# Patient Record
Sex: FEMALE | Race: BLACK OR AFRICAN AMERICAN | Employment: OTHER | URBAN - METROPOLITAN AREA
[De-identification: names, ages, dates, MRNs, and addresses within clinical notes are randomized per-mention and may not be internally consistent; named-entity substitution may affect disease eponyms.]

---

## 2017-01-24 ENCOUNTER — HOSPITAL ENCOUNTER (OUTPATIENT)
Dept: WOMENS IMAGING | Age: 67
Discharge: HOME | End: 2017-01-24
Attending: OBSTETRICS & GYNECOLOGY | Admitting: OBSTETRICS & GYNECOLOGY

## 2017-01-24 DIAGNOSIS — N95.1 MENOPAUSAL AND FEMALE CLIMACTERIC STATES: ICD-10-CM

## 2017-06-02 ENCOUNTER — HOSPITAL ENCOUNTER (OUTPATIENT)
Dept: OTHER | Age: 67
Discharge: HOME | End: 2017-06-02
Attending: INTERNAL MEDICINE | Admitting: INTERNAL MEDICINE

## 2017-06-02 LAB
ALBUMIN SERPL-MCNC: 4.1 GM/DL (ref 3.4–5)
ALP BLD-CCNC: 64 IU/L (ref 40–128)
ALT SERPL-CCNC: 22 U/L (ref 10–40)
ANION GAP SERPL CALCULATED.3IONS-SCNC: 18 MMOL/L (ref 4–16)
AST SERPL-CCNC: 24 IU/L (ref 15–37)
BILIRUB SERPL-MCNC: 0.4 MG/DL (ref 0–1)
BUN BLDV-MCNC: 15 MG/DL (ref 6–23)
CALCIUM SERPL-MCNC: 10 MG/DL (ref 8.3–10.6)
CEA: 1.3 NG/ML
CHLORIDE BLD-SCNC: 95 MMOL/L (ref 99–110)
CO2: 26 MMOL/L (ref 21–32)
CREAT SERPL-MCNC: 1.2 MG/DL (ref 0.6–1.1)
GFR AFRICAN AMERICAN: 54 ML/MIN/1.73M2
GFR NON-AFRICAN AMERICAN: 45 ML/MIN/1.73M2
GLUCOSE BLD-MCNC: 72 MG/DL (ref 70–140)
POTASSIUM SERPL-SCNC: 4 MMOL/L (ref 3.5–5.1)
SODIUM BLD-SCNC: 139 MMOL/L (ref 135–145)
TOTAL PROTEIN: 7.1 GM/DL (ref 6.4–8.2)

## 2017-06-04 LAB — CA 27.29: 23 U/ML

## 2017-08-14 ENCOUNTER — HOSPITAL ENCOUNTER (OUTPATIENT)
Dept: GENERAL RADIOLOGY | Age: 67
Discharge: HOME | End: 2017-08-14
Attending: FAMILY MEDICINE | Admitting: FAMILY MEDICINE

## 2017-08-14 LAB
ALBUMIN SERPL-MCNC: 4.5 GM/DL (ref 3.4–5)
ALP BLD-CCNC: 75 IU/L (ref 40–129)
ALT SERPL-CCNC: 20 U/L (ref 10–40)
ANION GAP SERPL CALCULATED.3IONS-SCNC: 12 MMOL/L (ref 4–16)
AST SERPL-CCNC: 23 IU/L (ref 15–37)
BACTERIA: ABNORMAL /HPF
BILIRUB SERPL-MCNC: 0.5 MG/DL (ref 0–1)
BILIRUBIN URINE: NEGATIVE MG/DL
BLOOD, URINE: NEGATIVE
BUN BLDV-MCNC: 14 MG/DL (ref 6–23)
CALCIUM SERPL-MCNC: 10.6 MG/DL (ref 8.3–10.6)
CHLORIDE BLD-SCNC: 98 MMOL/L (ref 99–110)
CLARITY: ABNORMAL
CO2: 29 MMOL/L (ref 21–32)
COLOR: YELLOW
CREAT SERPL-MCNC: 1.1 MG/DL (ref 0.6–1.1)
ERYTHROCYTE SEDIMENTATION RATE: 34 MM/HR (ref 0–30)
GFR AFRICAN AMERICAN: >60 ML/MIN/1.73M2
GFR NON-AFRICAN AMERICAN: 50 ML/MIN/1.73M2
GLUCOSE BLD-MCNC: 84 MG/DL (ref 70–140)
GLUCOSE, URINE: NEGATIVE MG/DL
KETONES, URINE: NEGATIVE MG/DL
LEUKOCYTE ESTERASE, URINE: NEGATIVE
MUCUS: ABNORMAL HPF
NITRITE URINE, QUANTITATIVE: NEGATIVE
PH, URINE: 6 (ref 5–8)
POTASSIUM SERPL-SCNC: 4.1 MMOL/L (ref 3.5–5.1)
PROTEIN UA: NEGATIVE MG/DL
RBC URINE: <1 /HPF (ref 0–6)
SODIUM BLD-SCNC: 139 MMOL/L (ref 135–145)
SPECIFIC GRAVITY UA: 1 (ref 1–1.03)
SQUAMOUS EPITHELIAL: 1 /HPF
TOTAL CK: 235 IU/L (ref 26–140)
TOTAL PROTEIN: 7.5 GM/DL (ref 6.4–8.2)
TRANSITIONAL EPITHELIAL: <1 /HPF
UROBILINOGEN, URINE: NORMAL MG/DL (ref 0.2–1)
WBC UA: <1 /HPF (ref 0–5)

## 2017-08-15 LAB — RHEUMATOID FACTOR: NEGATIVE

## 2017-08-16 PROBLEM — D05.11 DUCTAL CARCINOMA IN SITU (DCIS) OF RIGHT BREAST: Status: ACTIVE | Noted: 2017-08-16

## 2017-08-17 LAB
ANTI-NUCLEAR ANTIBODY (ANA): NORMAL
CYCLIC CITRULLINATED PEPTIDE ANTIBODY IGG: 6

## 2017-10-03 ENCOUNTER — HOSPITAL ENCOUNTER (OUTPATIENT)
Dept: OTHER | Age: 67
Discharge: HOME | End: 2017-10-03
Attending: INTERNAL MEDICINE | Admitting: INTERNAL MEDICINE

## 2017-10-03 LAB
ALBUMIN SERPL-MCNC: 4 GM/DL (ref 3.4–5)
ALP BLD-CCNC: 69 IU/L (ref 40–128)
ALT SERPL-CCNC: 21 U/L (ref 10–40)
ANION GAP SERPL CALCULATED.3IONS-SCNC: 13 MMOL/L (ref 4–16)
AST SERPL-CCNC: 23 IU/L (ref 15–37)
BILIRUB SERPL-MCNC: 0.4 MG/DL (ref 0–1)
BUN BLDV-MCNC: 11 MG/DL (ref 6–23)
CALCIUM SERPL-MCNC: 10 MG/DL (ref 8.3–10.6)
CHLORIDE BLD-SCNC: 98 MMOL/L (ref 99–110)
CO2: 28 MMOL/L (ref 21–32)
CREAT SERPL-MCNC: 1.1 MG/DL (ref 0.6–1.1)
GFR AFRICAN AMERICAN: >60 ML/MIN/1.73M2
GFR NON-AFRICAN AMERICAN: 50 ML/MIN/1.73M2
GLUCOSE BLD-MCNC: 84 MG/DL (ref 70–140)
LACTATE DEHYDROGENASE: 201 IU/L (ref 120–246)
POTASSIUM SERPL-SCNC: 3.9 MMOL/L (ref 3.5–5.1)
SODIUM BLD-SCNC: 139 MMOL/L (ref 135–145)
TOTAL PROTEIN: 7 GM/DL (ref 6.4–8.2)

## 2017-10-04 ENCOUNTER — HOSPITAL ENCOUNTER (OUTPATIENT)
Dept: ULTRASOUND IMAGING | Age: 67
Discharge: HOME | End: 2017-10-04
Attending: INTERNAL MEDICINE | Admitting: INTERNAL MEDICINE

## 2017-10-04 DIAGNOSIS — C50.411 MALIGNANT NEOPLASM OF UPPER-OUTER QUADRANT OF RIGHT FEMALE BREAST (HCC): ICD-10-CM

## 2017-10-04 LAB
ALBUMIN ELP: 3.7 GM/DL (ref 3.2–5.6)
ALPHA-1-GLOBULIN: 0.2 GM/DL (ref 0.1–0.4)
ALPHA-2-GLOBULIN: 0.6 GM/DL (ref 0.4–1.2)
BETA GLOBULIN: 1.1 GM/DL (ref 0.5–1.3)
GAMMA GLOBULIN: 1.4 GM/DL (ref 0.5–1.6)
TOTAL PROTEIN: 7 GM/DL (ref 6.4–8.2)

## 2018-08-20 ENCOUNTER — HOSPITAL ENCOUNTER (OUTPATIENT)
Dept: GENERAL RADIOLOGY | Age: 68
Discharge: HOME | End: 2018-08-20
Attending: FAMILY MEDICINE | Admitting: FAMILY MEDICINE

## 2018-08-20 LAB
ALBUMIN SERPL-MCNC: 4.7 GM/DL (ref 3.4–5)
ALP BLD-CCNC: 79 IU/L (ref 40–129)
ALT SERPL-CCNC: 22 U/L (ref 10–40)
ANION GAP SERPL CALCULATED.3IONS-SCNC: 18 MMOL/L (ref 4–16)
AST SERPL-CCNC: 27 IU/L (ref 15–37)
BACTERIA: ABNORMAL /HPF
BASOPHILS ABSOLUTE: 0.1 K/CU MM
BASOPHILS RELATIVE PERCENT: 0.8 % (ref 0–1)
BILIRUB SERPL-MCNC: 0.4 MG/DL (ref 0–1)
BILIRUBIN URINE: NEGATIVE MG/DL
BLOOD, URINE: NEGATIVE
BUN BLDV-MCNC: 12 MG/DL (ref 6–23)
CALCIUM SERPL-MCNC: 11.1 MG/DL (ref 8.3–10.6)
CHLORIDE BLD-SCNC: 91 MMOL/L (ref 99–110)
CLARITY: CLEAR
CO2: 26 MMOL/L (ref 21–32)
COLOR: YELLOW
CREAT SERPL-MCNC: 1.1 MG/DL (ref 0.6–1.1)
DIFFERENTIAL TYPE: ABNORMAL
EOSINOPHILS ABSOLUTE: 0.1 K/CU MM
EOSINOPHILS RELATIVE PERCENT: 1.1 % (ref 0–3)
GFR AFRICAN AMERICAN: 60 ML/MIN/1.73M2
GFR NON-AFRICAN AMERICAN: 50 ML/MIN/1.73M2
GLUCOSE FASTING: 95 MG/DL (ref 70–99)
GLUCOSE, URINE: NEGATIVE MG/DL
HCT VFR BLD CALC: 47.2 % (ref 37–47)
HEMOGLOBIN: 15.6 GM/DL (ref 12.5–16)
IMMATURE NEUTROPHIL %: 0.1 % (ref 0–0.43)
KETONES, URINE: NEGATIVE MG/DL
LEUKOCYTE ESTERASE, URINE: NEGATIVE
LYMPHOCYTES ABSOLUTE: 3.6 K/CU MM
LYMPHOCYTES RELATIVE PERCENT: 45.6 % (ref 24–44)
MCH RBC QN AUTO: 30.6 PG (ref 27–31)
MCHC RBC AUTO-ENTMCNC: 33.1 % (ref 32–36)
MCV RBC AUTO: 92.5 FL (ref 78–100)
MONOCYTES ABSOLUTE: 0.8 K/CU MM
MONOCYTES RELATIVE PERCENT: 10.3 % (ref 0–4)
MUCUS: ABNORMAL HPF
NITRITE URINE, QUANTITATIVE: NEGATIVE
NUCLEATED RBC %: 0 %
PDW BLD-RTO: 13.1 % (ref 11.7–14.9)
PH, URINE: 5 (ref 5–8)
PLATELET # BLD: 306 K/CU MM (ref 140–440)
PMV BLD AUTO: 11 FL (ref 7.5–11.1)
POTASSIUM SERPL-SCNC: 3.7 MMOL/L (ref 3.5–5.1)
PROTEIN UA: NEGATIVE MG/DL
RBC # BLD: 5.1 M/CU MM (ref 4.2–5.4)
RBC URINE: <1 /HPF (ref 0–6)
SEGMENTED NEUTROPHILS ABSOLUTE COUNT: 3.4 K/CU MM
SEGMENTED NEUTROPHILS RELATIVE PERCENT: 42.1 % (ref 36–66)
SODIUM BLD-SCNC: 135 MMOL/L (ref 135–145)
SPECIFIC GRAVITY UA: 1 (ref 1–1.03)
SQUAMOUS EPITHELIAL: 1 /HPF
TOTAL CK: 270 IU/L (ref 26–140)
TOTAL IMMATURE NEUTOROPHIL: 0.01 K/CU MM
TOTAL NUCLEATED RBC: 0 K/CU MM
TOTAL PROTEIN: 7.9 GM/DL (ref 6.4–8.2)
TRICHOMONAS: ABNORMAL /HPF
UROBILINOGEN, URINE: NORMAL MG/DL (ref 0.2–1)
WBC # BLD: 8 K/CU MM (ref 4–10.5)
WBC UA: <1 /HPF (ref 0–5)

## 2018-09-13 ENCOUNTER — HOSPITAL ENCOUNTER (OUTPATIENT)
Dept: GENERAL RADIOLOGY | Age: 68
Discharge: HOME | End: 2018-09-13
Attending: FAMILY MEDICINE | Admitting: FAMILY MEDICINE

## 2018-09-13 LAB
ANION GAP SERPL CALCULATED.3IONS-SCNC: 9 MMOL/L (ref 4–16)
CHLORIDE BLD-SCNC: 100 MMOL/L (ref 99–110)
CO2: 31 MMOL/L (ref 21–32)
POTASSIUM SERPL-SCNC: 4 MMOL/L (ref 3.5–5.1)
SODIUM BLD-SCNC: 140 MMOL/L (ref 135–145)

## 2018-09-15 LAB — PARATHYROID HORMONE INTACT: 55

## 2018-10-08 ENCOUNTER — HOSPITAL ENCOUNTER (OUTPATIENT)
Dept: ULTRASOUND IMAGING | Age: 68
End: 2018-10-08
Payer: MEDICARE

## 2018-10-08 ENCOUNTER — HOSPITAL ENCOUNTER (OUTPATIENT)
Dept: WOMENS IMAGING | Age: 68
Discharge: HOME | End: 2018-10-08
Payer: MEDICARE

## 2018-10-08 DIAGNOSIS — C50.211 MALIGNANT NEOPLASM OF UPPER-INNER QUADRANT OF RIGHT FEMALE BREAST, UNSPECIFIED ESTROGEN RECEPTOR STATUS (HCC): ICD-10-CM

## 2018-10-08 PROCEDURE — G0279 TOMOSYNTHESIS, MAMMO: HCPCS

## 2019-07-30 ENCOUNTER — OFFICE VISIT (OUTPATIENT)
Dept: INTERNAL MEDICINE CLINIC | Age: 69
End: 2019-07-30
Payer: MEDICARE

## 2019-07-30 VITALS
HEIGHT: 67 IN | BODY MASS INDEX: 35 KG/M2 | SYSTOLIC BLOOD PRESSURE: 130 MMHG | OXYGEN SATURATION: 97 % | DIASTOLIC BLOOD PRESSURE: 92 MMHG | WEIGHT: 223 LBS | HEART RATE: 79 BPM

## 2019-07-30 DIAGNOSIS — E66.09 CLASS 1 OBESITY DUE TO EXCESS CALORIES WITHOUT SERIOUS COMORBIDITY WITH BODY MASS INDEX (BMI) OF 34.0 TO 34.9 IN ADULT: ICD-10-CM

## 2019-07-30 DIAGNOSIS — Z79.899 LONG TERM USE OF DRUG: ICD-10-CM

## 2019-07-30 DIAGNOSIS — Z00.00 ROUTINE GENERAL MEDICAL EXAMINATION AT A HEALTH CARE FACILITY: Primary | ICD-10-CM

## 2019-07-30 PROCEDURE — G0438 PPPS, INITIAL VISIT: HCPCS | Performed by: FAMILY MEDICINE

## 2019-07-30 PROCEDURE — G0447 BEHAVIOR COUNSEL OBESITY 15M: HCPCS | Performed by: FAMILY MEDICINE

## 2019-07-30 ASSESSMENT — PATIENT HEALTH QUESTIONNAIRE - PHQ9
SUM OF ALL RESPONSES TO PHQ QUESTIONS 1-9: 0
SUM OF ALL RESPONSES TO PHQ QUESTIONS 1-9: 0

## 2019-08-08 ENCOUNTER — HOSPITAL ENCOUNTER (OUTPATIENT)
Age: 69
Discharge: HOME | End: 2019-08-08
Payer: MEDICARE

## 2019-08-08 LAB
ANION GAP SERPL CALCULATED.3IONS-SCNC: 12 MMOL/L (ref 4–16)
BUN BLDV-MCNC: 11 MG/DL (ref 6–23)
CALCIUM SERPL-MCNC: 10.1 MG/DL (ref 8.3–10.6)
CHLORIDE BLD-SCNC: 98 MMOL/L (ref 99–110)
CO2: 28 MMOL/L (ref 21–32)
CREAT SERPL-MCNC: 1.1 MG/DL (ref 0.6–1.1)
GFR AFRICAN AMERICAN: 60 ML/MIN/1.73M2
GFR NON-AFRICAN AMERICAN: 49 ML/MIN/1.73M2
GLUCOSE BLD-MCNC: 79 MG/DL (ref 70–99)
POTASSIUM SERPL-SCNC: 4.1 MMOL/L (ref 3.5–5.1)
SODIUM BLD-SCNC: 138 MMOL/L (ref 135–145)

## 2019-08-08 PROCEDURE — 80048 BASIC METABOLIC PNL TOTAL CA: CPT

## 2019-08-08 PROCEDURE — 36415 COLL VENOUS BLD VENIPUNCTURE: CPT

## 2019-08-15 ASSESSMENT — PATIENT HEALTH QUESTIONNAIRE - PHQ9
SUM OF ALL RESPONSES TO PHQ QUESTIONS 1-9: 0
SUM OF ALL RESPONSES TO PHQ QUESTIONS 1-9: 0

## 2019-08-15 NOTE — PROGRESS NOTES
Preventive Plan   Current Health Maintenance Status    There is no immunization history on file for this patient. Health Maintenance   Topic Date Due    DTaP/Tdap/Td vaccine (1 - Tdap) 1969    Shingles Vaccine (1 of 2) 2000    Colon cancer screen colonoscopy  2000    Pneumococcal 65+ years Vaccine (1 of 2 - PCV13) 2015    Hepatitis C screen  2020 (Originally 1950)    Flu vaccine (1) 2019    Lipid screen  2019    Breast cancer screen  10/08/2019    Potassium monitoring  2020    Creatinine monitoring  2020    Annual Wellness Visit (AWV)  2020    DEXA (modify frequency per FRAX score)  Completed     Recommendations for Preventive Services Due: see orders and patient instructions/AVS.  . Recommended screening schedule for the next 5-10 years is provided to the patient in written form: see Patient Brittany Gillette was seen today for medicare awv. Diagnoses and all orders for this visit:    Routine general medical examination at a health care facility  -     [place], [address] term use of drug  -     BASIC METABOLIC PANEL; Future    Class 1 obesity due to excess calories without serious comorbidity with body mass index (BMI) of 34.0 to 34.9 in adult  -     [place] Behavior  obesity 15m []    Other orders  -     spironolactone-hydrochlorothiazide (ALDACTAZIDE) 50-50 MG per tablet; Take 1 tablet by mouth daily                        Medicare Annual Wellness Visit  Name: Juani Arriaga Date: 8/15/2019   MRN: [id number] Sex: Female   Age: 76 y.o. Ethnicity: Non-/Non    : 1950 Race: Black      Burton Wilkinson is here for [place] for behavioral, psychosocial and functional/safety risks, and cognitive dysfunction are all negative except as indicated below.  These results, as well as other patient data from the Health Risk Assessment form, are documented in Flowsheets orders for this visit:    Routine general medical examination at a health care facility  -     [place], [address] term use of drug  -     BASIC METABOLIC PANEL; Future    Class 1 obesity due to excess calories without serious comorbidity with body mass index (BMI) of 34.0 to 34.9 in adult  -     [place] Behavior  obesity 15m []    Other orders  -     spironolactone-hydrochlorothiazide (ALDACTAZIDE) 50-50 MG per tablet;  Take 1 tablet by mouth daily

## 2019-10-03 ENCOUNTER — HOSPITAL ENCOUNTER (OUTPATIENT)
Dept: RADIATION ONCOLOGY | Age: 69
Discharge: HOME | End: 2019-10-03
Payer: MEDICARE

## 2019-10-03 VITALS
BODY MASS INDEX: 34.29 KG/M2 | SYSTOLIC BLOOD PRESSURE: 148 MMHG | TEMPERATURE: 98.6 F | OXYGEN SATURATION: 96 % | WEIGHT: 218.5 LBS | HEIGHT: 67 IN | RESPIRATION RATE: 16 BRPM | DIASTOLIC BLOOD PRESSURE: 74 MMHG | HEART RATE: 72 BPM

## 2019-10-03 DIAGNOSIS — C50.811 MALIGNANT NEOPLASM OF OVERLAPPING SITES OF RIGHT BREAST IN FEMALE, ESTROGEN RECEPTOR NEGATIVE (HCC): Primary | ICD-10-CM

## 2019-10-03 DIAGNOSIS — Z17.1 MALIGNANT NEOPLASM OF OVERLAPPING SITES OF RIGHT BREAST IN FEMALE, ESTROGEN RECEPTOR NEGATIVE (HCC): Primary | ICD-10-CM

## 2019-10-10 ENCOUNTER — HOSPITAL ENCOUNTER (OUTPATIENT)
Dept: WOMENS IMAGING | Age: 69
Discharge: HOME | End: 2019-10-10
Payer: MEDICARE

## 2019-10-10 DIAGNOSIS — C50.811 MALIGNANT NEOPLASM OF OVERLAPPING SITES OF RIGHT FEMALE BREAST, UNSPECIFIED ESTROGEN RECEPTOR STATUS (HCC): ICD-10-CM

## 2019-10-10 PROCEDURE — G0279 TOMOSYNTHESIS, MAMMO: HCPCS

## 2020-07-27 RX ORDER — SPIRONOLACTONE AND HYDROCHLOROTHIAZIDE 50; 50 MG/1; MG/1
1 TABLET, FILM COATED ORAL DAILY
Qty: 90 TABLET | Refills: 0 | Status: SHIPPED | OUTPATIENT
Start: 2020-07-27 | End: 2020-07-31 | Stop reason: SDUPTHER

## 2020-07-31 ENCOUNTER — OFFICE VISIT (OUTPATIENT)
Dept: INTERNAL MEDICINE CLINIC | Age: 70
End: 2020-07-31
Payer: MEDICARE

## 2020-07-31 ENCOUNTER — NURSE ONLY (OUTPATIENT)
Dept: INTERNAL MEDICINE CLINIC | Age: 70
End: 2020-07-31

## 2020-07-31 VITALS
DIASTOLIC BLOOD PRESSURE: 88 MMHG | BODY MASS INDEX: 34.62 KG/M2 | SYSTOLIC BLOOD PRESSURE: 132 MMHG | HEIGHT: 66 IN | OXYGEN SATURATION: 94 % | WEIGHT: 215.4 LBS | TEMPERATURE: 97 F | HEART RATE: 88 BPM

## 2020-07-31 VITALS — TEMPERATURE: 97.1 F

## 2020-07-31 PROCEDURE — 4040F PNEUMOC VAC/ADMIN/RCVD: CPT | Performed by: FAMILY MEDICINE

## 2020-07-31 PROCEDURE — 1123F ACP DISCUSS/DSCN MKR DOCD: CPT | Performed by: FAMILY MEDICINE

## 2020-07-31 PROCEDURE — 3017F COLORECTAL CA SCREEN DOC REV: CPT | Performed by: FAMILY MEDICINE

## 2020-07-31 PROCEDURE — G0439 PPPS, SUBSEQ VISIT: HCPCS | Performed by: FAMILY MEDICINE

## 2020-07-31 RX ORDER — SPIRONOLACTONE AND HYDROCHLOROTHIAZIDE 50; 50 MG/1; MG/1
1 TABLET, FILM COATED ORAL DAILY
Qty: 90 TABLET | Refills: 3 | Status: SHIPPED | OUTPATIENT
Start: 2020-07-31 | End: 2021-08-02 | Stop reason: SDUPTHER

## 2020-07-31 ASSESSMENT — PATIENT HEALTH QUESTIONNAIRE - PHQ9
SUM OF ALL RESPONSES TO PHQ QUESTIONS 1-9: 0
SUM OF ALL RESPONSES TO PHQ QUESTIONS 1-9: 0

## 2020-07-31 ASSESSMENT — LIFESTYLE VARIABLES: HOW OFTEN DO YOU HAVE A DRINK CONTAINING ALCOHOL: 0

## 2020-07-31 NOTE — PROGRESS NOTES
Medicare Annual Wellness Visit  Name: Sowmya Rice Date: 2020   MRN: [id number] Sex: Female   Age: 71 y.o. Ethnicity: Non-/Non    : 1950 Race: Black      Burton Wilkinson is here for [place] for behavioral, psychosocial and functional/safety risks, and cognitive dysfunction are all negative except as indicated below. These results, as well as other patient data from the [address] form, are documented in Flowsheets linked to this Encounter.    -Pt on Aldactazide for years for P edema- stable  -Hx of B/L breast cancer-followed by Dr. Narda Lentz. 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 Allergies   Allergen Reactions    Penicillins     Tape Juanice Newcomer Tape] Rash         Prior to Visit Medications    Medication Sig Taking? Authorizing Provider   spironolactone-hydrochlorothiazide (ALDACTAZIDE) 50-50 MG per tablet Take 1 tablet by mouth daily Yes Nate Christopher DO   Pyridoxine HCl (VITAMIN B-6) 100 MG tablet Take 100 mg by mouth daily Yes Historical Provider, MD   vitamin B-12 (CYANOCOBALAMIN) 1000 MCG tablet Take 1,000 mcg by mouth daily Yes Historical Provider, MD   vitamin B-1 (THIAMINE) 100 MG tablet Take 100 mg by mouth daily Yes Historical Provider, MD   MULTIPLE VITAMIN PO Take  by mouth.  Yes Historical Provider, MD         Past Medical History:   Diagnosis Date    Cancer ([address]     Bilateral Breast    Chemotherapy-induced neuropathy ([facility] [address]     fingers and toes    History of external beam radiation therapy     [facility]    History of external beam radiation therapy     [facility] [place]    Lymphedema of right upper extremity        Past Surgical History:   Procedure Laterality Date    BREAST BIOPSY Left 2002    BREAST BIOPSY Right 2014    BREAST LUMPECTOMY Left 2002    BREAST LUMPECTOMY Right     Right Lumpectomy with [place] Node Bx    BREAST SURGERY Left 2011    Mastectomy with [place] Bx and Reconstruction    TUNNELED VENOUS PORT PLACEMENT         History reviewed. No pertinent family history. CareTeam (Including outside providers/suppliers regularly involved in providing care):   Patient Care Team:  Anju Daniels DO as PCP - General (Family Medicine)  Anju Daniels DO as PCP - [facility] Empaneled Provider  Ashley Grijalva MD as Consulting Physician (Obstetrics & Gynecology)  Yelena Vasquez DO as Consulting Physician (Proctology)  Nahid Matute MD as Consulting Physician (Radiation Oncology)  OLIVERIO Tyler (Optometry)    Wt Readings from Last 3 Encounters:   07/31/20 215 lb 6.4 oz (97.7 kg)   10/03/19 218 lb 8 oz (99.1 kg)   07/30/19 223 lb (101.2 kg)     Vitals:    07/31/20 1118   BP: 132/88   Pulse: 88   Temp: 97 °F (36.1 °C)   SpO2: 94%   Weight: 215 lb 6.4 oz (97.7 kg)   Height: 5' 6\" (1.676 m)     Body mass index is 34.77 kg/m². Based upon direct observation of the patient, evaluation of cognition reveals recent and remote memory intact. Skin: warm and dry, no rash or erythema  Head: normocephalic and atraumatic  Eyes: pupils equal, round, and reactive to light, extraocular eye movements intact, conjunctivae normal  ENT: tympanic membrane, external ear and ear canal normal bilaterally, nose without deformity  Neck: supple.  No cervical lymphadenopathy  Pulmonary/Chest: clear to auscultation bilaterally- no wheezes, rales or rhonchi, normal air movement, no respiratory distress  Cardiovascular: normal rate, regular rhythm, normal S1 and S2, no murmurs, no carotid bruits  Abdomen: soft, non-tender, non-distended, normal bowel sounds  Extremities: mild ankle edema  Musculoskeletal: normal range of motion, no tenderness  Neurologic: no cranial nerve deficit, gait, coordination and speech normal    Patient's complete Health Risk Assessment and screening values have been reviewed and are found in Flowsheets. The following problems were reviewed today and where indicated follow up appointments were made and/or referrals ordered. Positive Risk Factor Screenings with Interventions:     General Health:  General  In general, how would you say your health is?: Very Good  In the past 7 days, have you experienced any of the following? New or Increased Pain, New or Increased Fatigue, Loneliness, Social Isolation, Stress or Anger?: None of These  Do you get the social and emotional support that you need?: Yes  Do you have a Living Will?: (!) No  General Health Risk Interventions:  · Pt may consider living will    Health Habits/Nutrition:  Health Habits/Nutrition  Do you exercise for at least 20 minutes 2-3 times per week?: (!) No  Have you lost any weight without trying in the past 3 months?: No  Do you eat fewer than 2 meals per day?: No  Have you seen a dentist within the past year?: Yes  Body mass index is 34.77 kg/m². Health Habits/Nutrition Interventions:  · Pt states  she does household chores     Hearing/Vision:  No exam data present  Hearing/Vision  Do you or your family notice any trouble with your hearing?: No  Do you have difficulty driving, watching TV, or doing any of your daily activities because of your eyesight?: No  Have you had an eye exam within the past year?: (!) No  Hearing/Vision Interventions:  · Pt will see Dr Janice Linn for eye exam    Personalized Preventive Plan   Current Health Maintenance Status    There is no immunization history on file for this patient.      Health Maintenance   Topic Date Due    Hepatitis C screen  1950    DTaP/Tdap/Td vaccine (1 - Tdap) 11/26/1969    Shingles Vaccine (1 of 2) 11/26/2000    Colon cancer screen colonoscopy  11/26/2000    Pneumococcal 65+ years Vaccine (1 of 1 - PPSV23) 11/26/2015    Annual Wellness Visit (AWV)  06/23/2019    Lipid screen  09/12/2019    Potassium monitoring  08/08/2020    Creatinine monitoring  08/08/2020    Flu vaccine (1) 09/01/2020    Breast cancer screen  10/10/2020    DEXA (modify frequency per FRAX score)  Completed    Hepatitis A vaccine  Aged Out    Hepatitis B vaccine  Aged Out    Hib vaccine  Aged Out    Meningococcal (ACWY) vaccine  Aged Out     Recommendations for Truly Accomplished Due: see orders and patient instructions/AVS.  . Recommended screening schedule for the next 5-10 years is provided to the patient in written form: see Patient Rose Pan was seen today for medicare aw. Diagnoses and all orders for this visit:    Routine general medical examination at a health care facility    Fatigue, unspecified type  -     CBC Auto Differential; Future  -     Comprehensive Metabolic Panel; Future    Chemotherapy-induced neuropathy (HCC)    Other orders  -     spironolactone-hydrochlorothiazide (ALDACTAZIDE) 50-50 MG per tablet;  Take 1 tablet by mouth daily    -Declines to follow up for repeat colon cancer screening at this time  -Declines lipid panel  -Declines vaccinations at this time  - Keep f/u with Gyn and specialists

## 2020-07-31 NOTE — PATIENT INSTRUCTIONS
Personalized Preventive Plan for [facility] - 7/31/2020  Medicare offers a range of preventive health benefits. Some of the tests and screenings are paid in full while other may be subject to a deductible, co-insurance, and/or copay. Some of these benefits include a comprehensive review of your medical history including lifestyle, illnesses that may run in your family, and various assessments and screenings as appropriate. After reviewing your medical record and screening and assessments performed today your provider may have ordered immunizations, labs, imaging, and/or referrals for you. A list of these orders (if applicable) as well as your Preventive Care list are included within your After Visit Summary for your review. Other Preventive Recommendations:    · A preventive eye exam performed by an eye specialist is recommended every 1-2 years to screen for glaucoma; cataracts, macular degeneration, and other eye disorders. · A preventive dental visit is recommended every 6 months. · Try to get at least 150 minutes of exercise per week or 10,000 steps per day on a pedometer . · Order or download the FREE \"Exercise & Physical Activity: Your Everyday Guide\" from [employer]. Call 7-619.572.9892 or search [employer] online. · You need 7491-3550 mg of calcium and 2433-9627 IU of vitamin D per day. It is possible to meet your calcium requirement with diet alone, but a vitamin D supplement is usually necessary to meet this goal.  · When exposed to the sun, use a sunscreen that protects against both UVA and UVB radiation with an SPF of 30 or greater. Reapply every 2 to 3 hours or after sweating, drying off with a towel, or swimming. · Always wear a seat belt when traveling in a car. Always wear a helmet when riding a bicycle or motorcycle.

## 2020-08-13 ENCOUNTER — HOSPITAL ENCOUNTER (OUTPATIENT)
Age: 70
Discharge: HOME | End: 2020-08-13
Payer: MEDICARE

## 2020-08-13 LAB
ALBUMIN SERPL-MCNC: 4.4 GM/DL (ref 3.4–5)
ALP BLD-CCNC: 63 IU/L (ref 40–128)
ALT SERPL-CCNC: 20 U/L (ref 10–40)
ANION GAP SERPL CALCULATED.3IONS-SCNC: 8 MMOL/L (ref 4–16)
AST SERPL-CCNC: 23 IU/L (ref 15–37)
BASOPHILS ABSOLUTE: 0.1 K/CU MM
BASOPHILS RELATIVE PERCENT: 1.2 % (ref 0–1)
BILIRUB SERPL-MCNC: 0.6 MG/DL (ref 0–1)
BUN BLDV-MCNC: 13 MG/DL (ref 6–23)
CALCIUM SERPL-MCNC: 10.5 MG/DL (ref 8.3–10.6)
CHLORIDE BLD-SCNC: 100 MMOL/L (ref 99–110)
CO2: 30 MMOL/L (ref 21–32)
CREAT SERPL-MCNC: 1.2 MG/DL (ref 0.6–1.1)
DIFFERENTIAL TYPE: ABNORMAL
EOSINOPHILS ABSOLUTE: 0.1 K/CU MM
EOSINOPHILS RELATIVE PERCENT: 0.9 % (ref 0–3)
GFR AFRICAN AMERICAN: 54 ML/MIN/1.73M2
GFR NON-AFRICAN AMERICAN: 45 ML/MIN/1.73M2
GLUCOSE BLD-MCNC: 120 MG/DL (ref 70–99)
HCT VFR BLD CALC: 46.7 % (ref 37–47)
HEMOGLOBIN: 15 GM/DL (ref 12.5–16)
IMMATURE NEUTROPHIL %: 0.4 % (ref 0–0.43)
LYMPHOCYTES ABSOLUTE: 2.5 K/CU MM
LYMPHOCYTES RELATIVE PERCENT: 37 % (ref 24–44)
MCH RBC QN AUTO: 29.8 PG (ref 27–31)
MCHC RBC AUTO-ENTMCNC: 32.1 % (ref 32–36)
MCV RBC AUTO: 92.7 FL (ref 78–100)
MONOCYTES ABSOLUTE: 0.7 K/CU MM
MONOCYTES RELATIVE PERCENT: 9.6 % (ref 0–4)
NUCLEATED RBC %: 0 %
PDW BLD-RTO: 13.2 % (ref 11.7–14.9)
PLATELET # BLD: 265 K/CU MM (ref 140–440)
PMV BLD AUTO: 10.8 FL (ref 7.5–11.1)
POTASSIUM SERPL-SCNC: 3.8 MMOL/L (ref 3.5–5.1)
RBC # BLD: 5.04 M/CU MM (ref 4.2–5.4)
SEGMENTED NEUTROPHILS ABSOLUTE COUNT: 3.4 K/CU MM
SEGMENTED NEUTROPHILS RELATIVE PERCENT: 50.9 % (ref 36–66)
SODIUM BLD-SCNC: 138 MMOL/L (ref 135–145)
TOTAL IMMATURE NEUTOROPHIL: 0.03 K/CU MM
TOTAL NUCLEATED RBC: 0 K/CU MM
TOTAL PROTEIN: 7.3 GM/DL (ref 6.4–8.2)
WBC # BLD: 6.8 K/CU MM (ref 4–10.5)

## 2020-08-13 PROCEDURE — 36415 COLL VENOUS BLD VENIPUNCTURE: CPT

## 2020-08-13 PROCEDURE — 85025 COMPLETE CBC W/AUTO DIFF WBC: CPT

## 2020-08-13 PROCEDURE — 80053 COMPREHEN METABOLIC PANEL: CPT

## 2020-08-14 RX ORDER — SPIRONOLACTONE AND HYDROCHLOROTHIAZIDE 50; 50 MG/1; MG/1
1 TABLET, FILM COATED ORAL DAILY
Qty: 90 TABLET | Refills: 3 | OUTPATIENT
Start: 2020-08-14

## 2020-10-08 ENCOUNTER — HOSPITAL ENCOUNTER (OUTPATIENT)
Dept: RADIATION ONCOLOGY | Age: 70
Discharge: HOME | End: 2020-10-08
Payer: MEDICARE

## 2020-10-08 VITALS
HEIGHT: 67 IN | OXYGEN SATURATION: 96 % | HEART RATE: 80 BPM | TEMPERATURE: 97.8 F | RESPIRATION RATE: 18 BRPM | SYSTOLIC BLOOD PRESSURE: 158 MMHG | WEIGHT: 216 LBS | BODY MASS INDEX: 33.9 KG/M2 | DIASTOLIC BLOOD PRESSURE: 78 MMHG

## 2020-10-08 PROCEDURE — 99211 OFF/OP EST MAY X REQ PHY/QHP: CPT

## 2020-10-08 PROCEDURE — 99213 OFFICE O/P EST LOW 20 MIN: CPT | Performed by: RADIOLOGY

## 2020-10-08 ASSESSMENT — PAIN SCALES - GENERAL: PAINLEVEL_OUTOF10: 0

## 2020-10-08 NOTE — PROGRESS NOTES
[id number] [id number] [facility], [address] [facility]  Phone: [phone]  Fax: [phone]    RADIATION ONCOLOGY FOLLOW UP REPORT    PATIENT NAME:  Ac Reed Senior              : 1950  MEDICAL RECORD NO: [id number]    [facility] NO: [id number]        PROVIDER: Marian Serrano MD      DATE OF SERVICE: 10/8/2020     FOLLOW UP PHYSICIANS:  Dr. Aravind Childress      DIAGNOSIS:  Visit Diagnoses       Codes    Personal history of malignant neoplasm of breast    -  Primary Z85.3       Cancer Staging  No matching staging information was found for the patient. HPI:   Edith Crowder is a 71 y.o. female who has a history of bilateral stage I breast cancer:     Left-T1 N0 M0 triple receptor positive indicating ductal carcinoma status post breast conserving surgery, completing [address] on 2002 followed by 5 years of tamoxifen with subsequent recurrent disease in 2011 for which she underwent a left skin sparing mastectomy with reconstruction with final pathology showing DCIS ER/MS positive FISH positive for HER-2/marleny;      right-T1 cN0 M0 triple receptor negative infiltrating ductal carcinoma status post breast conserving surgery, 4 cycles of Adriamycin/Cytoxan, 12 cycles Taxol, and 45 [address] right breast RT with the lumpectomy bed completing [address] on 2015.     She returns today for a routine follow-up visit. She was last seen by me in 2019, and was doing well at that time without evidence of recurrent or metastatic disease. Her most recent mammogram was obtained of the right breast in 2019, showing benign findings. She is scheduled to undergo right breast mammography later this month. Currently, the patient reports she's been doing well. Her energy level has been fairly good overall. She denies any fevers, chills, or drenching night sweats. Her weight and appetite have been stable.   She denies any chest pain or shortness of breath. She has not noticed any new lumps or bumps anywhere throughout her body. She denies the new onset of bony pain. She is performing self breast and chest wall examinations and has not noticed any areas that concern her. She denies any nipple discharge, skin redness, thickening, dimpling, or arm swelling that is new.         RADIOLOGIC STUDIES:  10/10/19 right mammography  Impression    1.  Stable benign right mammogram.         BIRADS:    BIRADS - CATEGORY 2         Benign, no evidence of malignancy.  Normal interval follow-up is recommended    in 12 months.         OVERALL ASSESSMENT - Benign          LABORATORY STUDIES:   CBC:   Lab Results   Component Value Date    WBC 6.8 08/13/2020    RBC 5.04 08/13/2020    HGB 15.0 08/13/2020    HCT 46.7 08/13/2020    MCV 92.7 08/13/2020    MCH 29.8 08/13/2020    MCHC 32.1 08/13/2020    RDW 13.2 08/13/2020     08/13/2020    MPV 10.8 08/13/2020     CMP:  Lab Results   Component Value Date     08/13/2020    K 3.8 08/13/2020     08/13/2020    CO2 30 08/13/2020    BUN 13 08/13/2020    CREATININE 1.2 08/13/2020    GFRAA 54 08/13/2020    LABGLOM 45 08/13/2020    GLUCOSE 120 08/13/2020    PROT 7.3 08/13/2020    PROT 7.8 09/19/2011    LABALBU 4.4 08/13/2020    CALCIUM 10.5 08/13/2020    BILITOT 0.6 08/13/2020    ALKPHOS 63 08/13/2020    AST 23 08/13/2020    ALT 20 08/13/2020     Onc labs:   Lab Results   Component Value Date    CEA 1.3 06/02/2017     10/03/2017       MEDICATIONS:   Current Outpatient Medications   Medication Sig Dispense Refill    APPLE CIDER VINEGAR PO Take 1 tablet by mouth daily      spironolactone-hydrochlorothiazide (ALDACTAZIDE) 50-50 MG per tablet Take 1 tablet by mouth daily 90 tablet 3    Pyridoxine HCl (VITAMIN B-6) 100 MG tablet Take 100 mg by mouth daily      vitamin B-12 (CYANOCOBALAMIN) 1000 MCG tablet Take 1,000 mcg by mouth daily      vitamin B-1 (THIAMINE) 100 MG tablet Take 100 mg by mouth daily      MULTIPLE VITAMIN PO Take  by mouth. No current facility-administered medications for this encounter. EXAMINATION:   Vitals:    10/08/20 1345   BP: (!) 158/78   Pulse: 80   Resp: 18   Temp: 97.8 °F (36.6 °C)   SpO2: 96%     The patient is in no acute distress. Neck: Supple no preauricular postauricular, submental, submandibular, cervical, supraclavicular, or infraclavicular lymphadenopathy is present. Heart: Regular rate and rhythm. No murmurs, clicks, or gallops are present. Lungs: Bilaterally clear to auscultation. No rales, rhonchi, or wheezing are present. Breast examination: Left reconstructed chest wall-no palpable masses. Right breast-no palpable masses. No nipple discharge is elicited. Mild residual hyperpigmentation and mild fibrosis. No axillary lymphadenopathy is palpable  Abdomen: Soft, nontender and nondistended. No hepatosplenomegaly or masses are appreciated. Extremities: No cyanosis, clubbing, or edema is present. ASSESSMENT AND PLAN:     Bella Salinas is a 71 y.o. female who has a history of bilateral breast cancers who is now approximately 5 years after completion of her right breast RT who is doing well at this time without evidence of recurrent or metastatic disease. She is to undergo right breast mammography later this month and to return to see me on an as-needed basis.        Electronically signed by Maira Lyons MD on 10/8/2020 at 3:16 PM

## 2020-10-08 NOTE — PROGRESS NOTES
Burton Wilkinson  10/8/2020    Patient is seen today for follow up. Vitals:    10/08/20 1345   BP: (!) 158/78   Pulse: 80   Resp: 18   Temp: 97.8 °F (36.6 °C)   SpO2: 96%        Wt Readings from Last 3 Encounters:   10/08/20 216 lb (98 kg)   07/31/20 215 lb 6.4 oz (97.7 kg)   10/03/19 218 lb 8 oz (99.1 kg)       Pain Assessment  Pain Assessment: 0-10  Pain Level: 0  Patient's Stated Pain Goal: No pain  Multiple Pain Sites: No  Denies Need for Intervention       Allergies   Allergen Reactions    Penicillins     Tape [Adhesive Tape] Rash        Current Outpatient Medications   Medication Sig Dispense Refill    APPLE CIDER VINEGAR PO Take 1 tablet by mouth daily      spironolactone-hydrochlorothiazide (ALDACTAZIDE) 50-50 MG per tablet Take 1 tablet by mouth daily 90 tablet 3    Pyridoxine HCl (VITAMIN B-6) 100 MG tablet Take 100 mg by mouth daily      vitamin B-12 (CYANOCOBALAMIN) 1000 MCG tablet Take 1,000 mcg by mouth daily      vitamin B-1 (THIAMINE) 100 MG tablet Take 100 mg by mouth daily      MULTIPLE VITAMIN PO Take  by mouth. No current facility-administered medications for this encounter. Additional Comments: Pt with no new complaints or concerns. Pt states due for mammogram this month.     Electronically signed by Mae Robb RN on 10/8/2020 at 1:48 PM

## 2020-10-16 ENCOUNTER — HOSPITAL ENCOUNTER (OUTPATIENT)
Dept: WOMENS IMAGING | Age: 70
Discharge: HOME | End: 2020-10-16
Payer: MEDICARE

## 2020-10-16 LAB — FECAL BLOOD IMMUNOCHEMICAL TEST: NEGATIVE

## 2020-10-16 PROCEDURE — G0279 TOMOSYNTHESIS, MAMMO: HCPCS

## 2021-02-18 ENCOUNTER — OFFICE VISIT (OUTPATIENT)
Dept: INTERNAL MEDICINE CLINIC | Age: 71
End: 2021-02-18
Payer: MEDICARE

## 2021-02-18 VITALS
DIASTOLIC BLOOD PRESSURE: 83 MMHG | BODY MASS INDEX: 34.58 KG/M2 | SYSTOLIC BLOOD PRESSURE: 130 MMHG | TEMPERATURE: 97.2 F | HEART RATE: 93 BPM | WEIGHT: 220.8 LBS | OXYGEN SATURATION: 95 %

## 2021-02-18 DIAGNOSIS — B02.9 HERPES ZOSTER WITHOUT COMPLICATION: Primary | ICD-10-CM

## 2021-02-18 PROCEDURE — 1036F TOBACCO NON-USER: CPT | Performed by: FAMILY MEDICINE

## 2021-02-18 PROCEDURE — 1090F PRES/ABSN URINE INCON ASSESS: CPT | Performed by: FAMILY MEDICINE

## 2021-02-18 PROCEDURE — 3017F COLORECTAL CA SCREEN DOC REV: CPT | Performed by: FAMILY MEDICINE

## 2021-02-18 PROCEDURE — 4040F PNEUMOC VAC/ADMIN/RCVD: CPT | Performed by: FAMILY MEDICINE

## 2021-02-18 PROCEDURE — G8427 DOCREV CUR MEDS BY ELIG CLIN: HCPCS | Performed by: FAMILY MEDICINE

## 2021-02-18 PROCEDURE — G8417 CALC BMI ABV UP PARAM F/U: HCPCS | Performed by: FAMILY MEDICINE

## 2021-02-18 PROCEDURE — 1123F ACP DISCUSS/DSCN MKR DOCD: CPT | Performed by: FAMILY MEDICINE

## 2021-02-18 PROCEDURE — 99213 OFFICE O/P EST LOW 20 MIN: CPT | Performed by: FAMILY MEDICINE

## 2021-02-18 PROCEDURE — G8399 PT W/DXA RESULTS DOCUMENT: HCPCS | Performed by: FAMILY MEDICINE

## 2021-02-18 PROCEDURE — G8484 FLU IMMUNIZE NO ADMIN: HCPCS | Performed by: FAMILY MEDICINE

## 2021-02-18 RX ORDER — VALACYCLOVIR HYDROCHLORIDE 1 G/1
1000 TABLET, FILM COATED ORAL 3 TIMES DAILY
Qty: 21 TABLET | Refills: 0 | Status: SHIPPED | OUTPATIENT
Start: 2021-02-18 | End: 2021-02-25

## 2021-02-18 ASSESSMENT — ENCOUNTER SYMPTOMS
COUGH: 0
SHORTNESS OF BREATH: 0
BACK PAIN: 0
ABDOMINAL PAIN: 0
NAUSEA: 0

## 2021-02-18 ASSESSMENT — PATIENT HEALTH QUESTIONNAIRE - PHQ9: SUM OF ALL RESPONSES TO PHQ QUESTIONS 1-9: 0

## 2021-02-18 NOTE — PROGRESS NOTES
Burton Wilkinson  1950  79 y.o.  female    Chief Complaint   Patient presents with    Herpes Zoster    Rash         History of Present Illness  Charanjit Wilkinson is a 79 y.o. female who presents today for c/o of a rash to L anterior thigh. Pt states she has a history of H. Zoster. Pt states she was prescribed Acyclovir ointment in the past by Dr. Kathy Elias, She recalls her legs aching for the past week, and then she noticed the rash 2 days ago. No fever, chills. Pt using Tylenol prn. Review of Systems   Constitutional: Negative for diaphoresis and fever. Respiratory: Negative for cough and shortness of breath. Cardiovascular: Negative for chest pain and palpitations. Gastrointestinal: Negative for abdominal pain and nausea. Musculoskeletal: Negative for back pain. Skin: Positive for rash. Neurological: Negative for dizziness. Allergies   Allergen Reactions    Penicillins     Tape Gloria Camps Tape] Rash       Past Medical History:   Diagnosis Date    Cancer ([address]     Bilateral Breast    Chemotherapy-induced neuropathy (HCC)     fingers and toes    History of external beam radiation therapy 2015    [facility] [place]    History of external beam radiation therapy 2002    [facility] [place]    Lymphedema of right upper extremity        Past Surgical History:   Procedure Laterality Date    BREAST BIOPSY Left 2002    BREAST BIOPSY Right 2014    BREAST LUMPECTOMY Left 2002    BREAST LUMPECTOMY Right 2014    Right Lumpectomy with [place] Node Bx    BREAST SURGERY Left 2011    Mastectomy with [place] Bx and Reconstruction    TUNNELED VENOUS PORT PLACEMENT         History reviewed. No pertinent family history.     Social History     Tobacco Use    Smoking status: Never Smoker    Smokeless tobacco: Never Used   Substance Use Topics    Alcohol use: No    Drug use: No       Current Outpatient Medications   Medication Sig Dispense Refill    valACYclovir (VALTREX) 1 g tablet Take 1 tablet by mouth 3 times daily for 7 days 21 tablet 0    APPLE CIDER VINEGAR PO Take 1 tablet by mouth daily      spironolactone-hydrochlorothiazide (ALDACTAZIDE) 50-50 MG per tablet Take 1 tablet by mouth daily 90 tablet 3    Pyridoxine HCl (VITAMIN B-6) 100 MG tablet Take 100 mg by mouth daily      vitamin B-12 (CYANOCOBALAMIN) 1000 MCG tablet Take 1,000 mcg by mouth daily      vitamin B-1 (THIAMINE) 100 MG tablet Take 100 mg by mouth daily      MULTIPLE VITAMIN PO Take  by mouth. No current facility-administered medications for this visit. OBJECTIVE:    /83   Pulse 93   Temp 97.2 °F (36.2 °C)   Wt 220 lb 12.8 oz (100.2 kg)   SpO2 95%   Breastfeeding No   BMI 34.58 kg/m²     Physical Exam  Vitals signs reviewed. Constitutional:       General: She is not in acute distress. Eyes:      Conjunctiva/sclera: Conjunctivae normal.   Neck:      Musculoskeletal: Neck supple. Cardiovascular:      Rate and Rhythm: Normal rate and regular rhythm. Pulmonary:      Effort: Pulmonary effort is normal. No respiratory distress. Breath sounds: Normal breath sounds. Abdominal:      General: Bowel sounds are normal.      Palpations: Abdomen is soft. Tenderness: There is no abdominal tenderness. Musculoskeletal:      Right lower leg: No edema. Left lower leg: No edema. Skin:     Findings: Rash (L thigh (anterior) ) present. Neurological:      Mental Status: She is alert and oriented to person, place, and time. Cranial Nerves: No cranial nerve deficit. Psychiatric:         Mood and Affect: Mood normal.         ASSESSMENT:  1.  Herpes zoster without complication        PLAN:    Orders Placed This Encounter   Medications    valACYclovir (VALTREX) 1 g tablet     Sig: Take 1 tablet by mouth 3 times daily for 7 days     Dispense:  21 tablet     Refill:  0   Start Valtrex  ADR's explained  Persist RTO or call           Return if symptoms worsen or fail to improve.     Electronically Signed by Yosi Prescott DO

## 2021-08-02 ENCOUNTER — OFFICE VISIT (OUTPATIENT)
Dept: INTERNAL MEDICINE CLINIC | Age: 71
End: 2021-08-02
Payer: MEDICARE

## 2021-08-02 VITALS
OXYGEN SATURATION: 98 % | HEIGHT: 66 IN | HEART RATE: 65 BPM | DIASTOLIC BLOOD PRESSURE: 80 MMHG | WEIGHT: 215.6 LBS | SYSTOLIC BLOOD PRESSURE: 130 MMHG | BODY MASS INDEX: 34.65 KG/M2

## 2021-08-02 DIAGNOSIS — N18.31 STAGE 3A CHRONIC KIDNEY DISEASE (HCC): Primary | ICD-10-CM

## 2021-08-02 DIAGNOSIS — T45.1X5A CHEMOTHERAPY-INDUCED NEUROPATHY (HCC): ICD-10-CM

## 2021-08-02 DIAGNOSIS — L98.9 LESION OF SKIN OF SCALP: ICD-10-CM

## 2021-08-02 DIAGNOSIS — Z79.899 LONG TERM USE OF DRUG: ICD-10-CM

## 2021-08-02 DIAGNOSIS — G62.0 CHEMOTHERAPY-INDUCED NEUROPATHY (HCC): ICD-10-CM

## 2021-08-02 DIAGNOSIS — R60.0 PEDAL EDEMA: ICD-10-CM

## 2021-08-02 PROCEDURE — 1090F PRES/ABSN URINE INCON ASSESS: CPT | Performed by: FAMILY MEDICINE

## 2021-08-02 PROCEDURE — 4040F PNEUMOC VAC/ADMIN/RCVD: CPT | Performed by: FAMILY MEDICINE

## 2021-08-02 PROCEDURE — G8399 PT W/DXA RESULTS DOCUMENT: HCPCS | Performed by: FAMILY MEDICINE

## 2021-08-02 PROCEDURE — 99214 OFFICE O/P EST MOD 30 MIN: CPT | Performed by: FAMILY MEDICINE

## 2021-08-02 PROCEDURE — 1123F ACP DISCUSS/DSCN MKR DOCD: CPT | Performed by: FAMILY MEDICINE

## 2021-08-02 PROCEDURE — 1036F TOBACCO NON-USER: CPT | Performed by: FAMILY MEDICINE

## 2021-08-02 PROCEDURE — G8417 CALC BMI ABV UP PARAM F/U: HCPCS | Performed by: FAMILY MEDICINE

## 2021-08-02 PROCEDURE — G8427 DOCREV CUR MEDS BY ELIG CLIN: HCPCS | Performed by: FAMILY MEDICINE

## 2021-08-02 PROCEDURE — 3017F COLORECTAL CA SCREEN DOC REV: CPT | Performed by: FAMILY MEDICINE

## 2021-08-02 RX ORDER — SPIRONOLACTONE AND HYDROCHLOROTHIAZIDE 50; 50 MG/1; MG/1
1 TABLET, FILM COATED ORAL DAILY
Qty: 90 TABLET | Refills: 3 | Status: SHIPPED | OUTPATIENT
Start: 2021-08-02 | End: 2022-07-28 | Stop reason: SDUPTHER

## 2021-08-02 SDOH — ECONOMIC STABILITY: FOOD INSECURITY: WITHIN THE PAST 12 MONTHS, YOU WORRIED THAT YOUR FOOD WOULD RUN OUT BEFORE YOU GOT MONEY TO BUY MORE.: NEVER TRUE

## 2021-08-02 SDOH — ECONOMIC STABILITY: FOOD INSECURITY: WITHIN THE PAST 12 MONTHS, THE FOOD YOU BOUGHT JUST DIDN'T LAST AND YOU DIDN'T HAVE MONEY TO GET MORE.: NEVER TRUE

## 2021-08-02 ASSESSMENT — ENCOUNTER SYMPTOMS
BLOOD IN STOOL: 0
NAUSEA: 0
BACK PAIN: 0
DIARRHEA: 0
CONSTIPATION: 0
SHORTNESS OF BREATH: 0
ABDOMINAL PAIN: 0
COUGH: 0

## 2021-08-02 ASSESSMENT — SOCIAL DETERMINANTS OF HEALTH (SDOH): HOW HARD IS IT FOR YOU TO PAY FOR THE VERY BASICS LIKE FOOD, HOUSING, MEDICAL CARE, AND HEATING?: NOT HARD AT ALL

## 2021-08-02 NOTE — PROGRESS NOTES
Burton Wilkinson (:  1950) is a 79 y.o. female,Established patient, here for evaluation of the following chief complaint(s):  H&P         ASSESSMENT/PLAN:  1. Stage 3a chronic kidney disease ([facility] [address] chronic  -     Comprehensive Metabolic Panel; Future  -     CBC Auto Differential; Future  2. Pedal edema chronic  -     spironolactone-hydrochlorothiazide (ALDACTAZIDE) 50-50 MG per tablet; Take 1 tablet by mouth daily, Disp-90 tablet, R-3Normal  3. Lesion of skin of scalp chronic  Pt would like to monitor at this time  4. Chemotherapy-induced neuropathy (HCC) chronic-stable  5. Long term use of drug  -     Comprehensive Metabolic Panel; Future  -     CBC Auto Differential; Future  Orders as above  Last CBC, CMP, FIT testing reviewed  Mammogram reviewed  On this date 2021 I have spent 30 minutes reviewing previous notes, test results and face to face with the patient discussing the diagnosis and importance of compliance with the treatment plan as well as documenting on the day of the visit.           Return in about 1 year (around 2022) for physical.     Lab Results   Component Value Date    WBC 6.8 2020    HGB 15.0 2020    HCT 46.7 2020    MCV 92.7 2020     2020     Lab Results   Component Value Date     2020    K 3.8 2020     2020    CO2 30 2020    BUN 13 2020    CREATININE 1.2 (H) 2020    GLUCOSE 120 (H) 2020    CALCIUM 10.5 2020    PROT 7.3 2020    LABALBU 4.4 2020    BILITOT 0.6 2020    ALKPHOS 63 2020    AST 23 2020    ALT 20 2020    LABGLOM 45 (L) 2020    GFRAA 54 (L) 2020     Last FIT test: Neg (10/8/2020)    Subjective   SUBJECTIVE/OBJECTIVE:  HPI: This 80 yo f for a history and physical  Patient Active Problem List    Diagnosis Date Noted    Pedal edema     Lymphedema of right upper extremity     Chemotherapy-induced neuropathy (HCC)     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 Pt in remission  -Stage 3a CKD- Cre 1.2/GFR 54 (2019)  -She c/o of a scalp lesion to posterior R scalp. No symptoms    Review of Systems   Constitutional: Negative for chills, diaphoresis and fever. HENT: Negative. Eyes: Negative for visual disturbance. Respiratory: Negative for cough and shortness of breath. Cardiovascular: Negative for chest pain and palpitations. Gastrointestinal: Negative for abdominal pain, blood in stool, constipation, diarrhea and nausea. Genitourinary: Negative for difficulty urinating and dysuria. Musculoskeletal: Negative for back pain. Neurological: Negative for dizziness, light-headedness and headaches. Psychiatric/Behavioral: Negative for dysphoric mood. Allergies   Allergen Reactions    Penicillins     Tape Gene Alcides Tape] Rash     Current Outpatient Medications   Medication Sig Dispense Refill    spironolactone-hydrochlorothiazide (ALDACTAZIDE) 50-50 MG per tablet Take 1 tablet by mouth daily 90 tablet 3    APPLE CIDER VINEGAR PO Take 1 tablet by mouth daily      Pyridoxine HCl (VITAMIN B-6) 100 MG tablet Take 100 mg by mouth daily      vitamin B-12 (CYANOCOBALAMIN) 1000 MCG tablet Take 1,000 mcg by mouth daily      vitamin B-1 (THIAMINE) 100 MG tablet Take 100 mg by mouth daily      MULTIPLE VITAMIN PO Take  by mouth. No current facility-administered medications for this visit.      Past Medical History:   Diagnosis Date    Cancer [facility])     Bilateral Breast    Chemotherapy-induced neuropathy (HCC)     fingers and toes    Ductal carcinoma in situ (DCIS) of right breast     History of external beam radiation therapy 2015    [facility]    History of external beam radiation therapy 2002    [facility]    Lymphedema of right upper extremity     Pedal edema        Past Surgical History:   Procedure Laterality Date    BREAST BIOPSY Left 2002    BREAST BIOPSY Right 2014    BREAST LUMPECTOMY Left 2002    BREAST LUMPECTOMY Right 2014    Right Lumpectomy with [place] Node Bx    BREAST SURGERY Left 2011    Mastectomy with [place] Bx and Reconstruction    TUNNELED VENOUS PORT PLACEMENT       Social History     Tobacco Use    Smoking status: Never Smoker    Smokeless tobacco: Never Used   Vaping Use    Vaping Use: Never used   Substance Use Topics    Alcohol use: No    Drug use: No            Objective    Vitals:    08/02/21 1550   BP: 130/80   Pulse: 65   SpO2: 98%   Weight: 215 lb 9.6 oz (97.8 kg)   Height: 5' 6\" (1.676 m)       Physical Exam  Vitals reviewed. Constitutional:       General: She is not in acute distress. HENT:      Right Ear: Tympanic membrane normal.      Left Ear: Tympanic membrane normal.   Eyes:      Extraocular Movements: Extraocular movements intact. Conjunctiva/sclera: Conjunctivae normal.      Pupils: Pupils are equal, round, and reactive to light. Cardiovascular:      Rate and Rhythm: Normal rate and regular rhythm. Pulmonary:      Effort: Pulmonary effort is normal. No respiratory distress. Breath sounds: Normal breath sounds. Abdominal:      General: Bowel sounds are normal. There is no distension. Palpations: Abdomen is soft. Tenderness: There is no abdominal tenderness. Musculoskeletal:      Cervical back: Neck supple. Right lower leg: Edema present. Left lower leg: Edema present. Skin:     Findings: No rash. Neurological:      Mental Status: She is alert and oriented to person, place, and time. Cranial Nerves: No cranial nerve deficit. Psychiatric:         Mood and Affect: Mood normal.          An electronic signature was used to authenticate this note.     --Mary Hernandez DO

## 2021-08-09 ENCOUNTER — VIRTUAL VISIT (OUTPATIENT)
Dept: INTERNAL MEDICINE CLINIC | Age: 71
End: 2021-08-09
Payer: MEDICARE

## 2021-08-09 DIAGNOSIS — Z00.00 ROUTINE GENERAL MEDICAL EXAMINATION AT A HEALTH CARE FACILITY: Primary | ICD-10-CM

## 2021-08-09 PROCEDURE — 3017F COLORECTAL CA SCREEN DOC REV: CPT | Performed by: FAMILY MEDICINE

## 2021-08-09 PROCEDURE — 4040F PNEUMOC VAC/ADMIN/RCVD: CPT | Performed by: FAMILY MEDICINE

## 2021-08-09 PROCEDURE — G0439 PPPS, SUBSEQ VISIT: HCPCS | Performed by: FAMILY MEDICINE

## 2021-08-09 PROCEDURE — 1123F ACP DISCUSS/DSCN MKR DOCD: CPT | Performed by: FAMILY MEDICINE

## 2021-08-09 ASSESSMENT — LIFESTYLE VARIABLES
HOW OFTEN DO YOU HAVE SIX OR MORE DRINKS ON ONE OCCASION: 0
HAVE YOU OR SOMEONE ELSE BEEN INJURED AS A RESULT OF YOUR DRINKING: 0
HOW OFTEN DURING THE LAST YEAR HAVE YOU BEEN UNABLE TO REMEMBER WHAT HAPPENED THE NIGHT BEFORE BECAUSE YOU HAD BEEN DRINKING: 0
HOW MANY STANDARD DRINKS CONTAINING ALCOHOL DO YOU HAVE ON A TYPICAL DAY: 0
HOW OFTEN DURING THE LAST YEAR HAVE YOU FAILED TO DO WHAT WAS NORMALLY EXPECTED FROM YOU BECAUSE OF DRINKING: 0
HOW OFTEN DURING THE LAST YEAR HAVE YOU HAD A FEELING OF GUILT OR REMORSE AFTER DRINKING: 0
HOW OFTEN DURING THE LAST YEAR HAVE YOU FOUND THAT YOU WERE NOT ABLE TO STOP DRINKING ONCE YOU HAD STARTED: 0
HOW OFTEN DO YOU HAVE A DRINK CONTAINING ALCOHOL: 1
AUDIT-C TOTAL SCORE: 1
HAS A RELATIVE, FRIEND, DOCTOR, OR ANOTHER HEALTH PROFESSIONAL EXPRESSED CONCERN ABOUT YOUR DRINKING OR SUGGESTED YOU CUT DOWN: 0
AUDIT TOTAL SCORE: 1
HOW OFTEN DURING THE LAST YEAR HAVE YOU NEEDED AN ALCOHOLIC DRINK FIRST THING IN THE MORNING TO GET YOURSELF GOING AFTER A NIGHT OF HEAVY DRINKING: 0

## 2021-08-09 ASSESSMENT — PATIENT HEALTH QUESTIONNAIRE - PHQ9
SUM OF ALL RESPONSES TO PHQ QUESTIONS 1-9: 0
1. LITTLE INTEREST OR PLEASURE IN DOING THINGS: 0
SUM OF ALL RESPONSES TO PHQ9 QUESTIONS 1 & 2: 0
SUM OF ALL RESPONSES TO PHQ QUESTIONS 1-9: 0
2. FEELING DOWN, DEPRESSED OR HOPELESS: 0
SUM OF ALL RESPONSES TO PHQ QUESTIONS 1-9: 0

## 2021-08-09 NOTE — PROGRESS NOTES
Medicare Annual Wellness Visit  Name:  Pelt Date: 2021   MRN: [id number] Sex: Female   Age: 79 y.o. Ethnicity: Non- / Non    : 1950 Race: Black / [place] is here for PHARMAJET for behavioral, psychosocial and functional/safety risks, and cognitive dysfunction are all negative except as indicated below. These results, as well as other patient data from the [address] form, are documented in Flowsheets linked to this Encounter. Allergies   Allergen Reactions    Penicillins     Tape Gene Alcides Tape] Rash       Prior to Visit Medications    Medication Sig Taking? Authorizing Provider   spironolactone-hydrochlorothiazide (ALDACTAZIDE) 50-50 MG per tablet Take 1 tablet by mouth daily Yes Meche Sin, DO   APPLE CIDER VINEGAR PO Take 1 tablet by mouth daily Yes Historical Provider, MD   Pyridoxine HCl (VITAMIN B-6) 100 MG tablet Take 100 mg by mouth daily Yes Historical Provider, MD   vitamin B-12 (CYANOCOBALAMIN) 1000 MCG tablet Take 1,000 mcg by mouth daily Yes Historical Provider, MD   vitamin B-1 (THIAMINE) 100 MG tablet Take 100 mg by mouth daily Yes Historical Provider, MD   MULTIPLE VITAMIN PO Take  by mouth.  Yes Historical Provider, MD       Past Medical History:   Diagnosis Date    Cancer ([facility] [address]     Bilateral Breast    Chemotherapy-induced neuropathy ([address]     fingers and toes    Ductal carcinoma in situ (DCIS) of right breast     History of external beam radiation therapy     [facility]    History of external beam radiation therapy     [facility]    Lymphedema of right upper extremity     Pedal edema        Past Surgical History:   Procedure Laterality Date    BREAST BIOPSY Left     BREAST BIOPSY Right     BREAST LUMPECTOMY Left     BREAST LUMPECTOMY Right     Right Lumpectomy with [place] Node Bx    BREAST SURGERY Left  Mastectomy with [place] Bx and Reconstruction    TUNNELED VENOUS PORT PLACEMENT         Family History   Problem Relation Age of Onset    Heart Failure Mother     High Blood Pressure Mother     Kidney Disease Father     Other Brother         homicide    Other Brother         sepsis       CareTeam (Including outside providers/suppliers regularly involved in providing care):   Patient Care Team:  Snehal Espinosa DO as PCP - General (Family Medicine)  Snehal Espinosa DO as PCP - [facility] Empaneled Provider  Luc Dent MD as Consulting Physician (Obstetrics & Gynecology)  Hanna Duque DO as Consulting Physician (Proctology)  Asael Villegas MD as Consulting Physician (Radiation Oncology)  OLIVERIO Eaton (Optometry)    Wt Readings from Last 3 Encounters:   08/02/21 215 lb 9.6 oz (97.8 kg)   02/18/21 220 lb 12.8 oz (100.2 kg)   10/08/20 216 lb (98 kg)     There were no vitals filed for this visit. There is no height or weight on file to calculate BMI. Based upon direct observation of the patient, evaluation of cognition reveals recent and remote memory intact. Patient's complete Health Risk Assessment and screening values have been reviewed and are found in Flowsheets. The following problems were reviewed today and where indicated follow up appointments were made and/or referrals ordered. Positive Risk Factor Screenings with Interventions:          General Health and ACP:  General  In general, how would you say your health is?: Very Good  In the past 7 days, have you experienced any of the following?  New or Increased Pain, New or Increased Fatigue, Loneliness, Social Isolation, Stress or Anger?: None of These  Do you get the social and emotional support that you need?: Yes  Do you have a Living Will?: (!) No  Advance Directives     Power of [facility] Will ACP-Advance Directive ACP-Power of     Not on File Not on File Not on File Not on [address] [facility] Interventions:  · No Living Will: Advance Care Planning addressed with patient today    Health Habits/Nutrition:  Health Habits/Nutrition  Do you exercise for at least 20 minutes 2-3 times per week?: (!) No  Have you lost any weight without trying in the past 3 months?: No  Do you eat only one meal per day?: No  Have you seen the dentist within the past year?: Yes     Health Habits/Nutrition Interventions:  · Inadequate physical activity:  patient is not ready to increase his/her physical activity level at this time    Hearing/Vision:  No exam data present  Hearing/Vision  Do you or your family notice any trouble with your hearing that hasn't been managed with hearing aids?: No  Do you have difficulty driving, watching TV, or doing any of your daily activities because of your eyesight?: No  Have you had an eye exam within the past year?: (!) No  Hearing/Vision Interventions:  · Vision concerns:  patient encouraged to make appointment with his/her eye specialist      Personalized Preventive Plan   Current Health Maintenance Status  Immunization History   Administered Date(s) Administered    COVID-19, Moderna, PF, 100mcg/0.5mL 02/09/2021, 03/09/2021    Influenza, Quadv, adjuvanted, 65 yrs +, IM, PF (Fluad) 10/02/2020        Health Maintenance   Topic Date Due    Hepatitis C screen  Never done    DTaP/Tdap/Td vaccine (1 - Tdap) Never done    Shingles Vaccine (1 of 2) Never done    Pneumococcal 65+ years Vaccine (1 of 1 - PPSV23) Never done   [employer] Visit (AWV)  Never done    Lipid screen  09/12/2019    Potassium monitoring  08/13/2021    Creatinine monitoring  08/13/2021    Diabetes screen  08/20/2021    Flu vaccine (1) 09/01/2021    Colon Cancer Screen FIT/FOBT  10/08/2021    Breast cancer screen  10/16/2021    DEXA (modify frequency per FRAX score)  Completed    COVID-19 Vaccine  Completed    Hepatitis A vaccine  Aged Out    Hepatitis B vaccine  Aged Out    Hib vaccine  Aged C/ Missael Tiago 19 Meningococcal (ACWY) vaccine  Aged Out     Recommendations for Preventive Services Due: see orders and patient instructions/AVS. Discussed vaccinations due. Patient states she will discuss these at next office visit. Unable to obtain 3 vital signs due to patient not having equipment to take blood pressure/temperature. Recommended screening schedule for the next 5-10 years is provided to the patient in written form: see Patient Instructions/AVS.    Oly BILLINGSLEY LPN, [birth year], performed the documented evaluation under the direct supervision of the attending physician. Burton Wilkinson, was evaluated through a synchronous (real-time) audio-video encounter. The patient (or guardian if applicable) is aware that this is a billable service. Verbal consent to proceed has been obtained within the past 12 months. The visit was conducted pursuant to the emergency declaration under the [address] [place] [address], [address] [place] [address] authority and the [email or web address] and [employer] General Act. Patient identification was verified, and a caregiver was present when appropriate. The patient was located in the state of [place], where the provider was credentialed to provide care. Total time spent for this encounter: Not billed by time    --Oly Arias LPN on 3/9/8632 at 1:72 PM    An electronic signature was used to authenticate this note.

## 2021-08-17 ENCOUNTER — HOSPITAL ENCOUNTER (OUTPATIENT)
Age: 71
Discharge: HOME | End: 2021-08-17
Payer: MEDICARE

## 2021-08-17 DIAGNOSIS — N18.31 STAGE 3A CHRONIC KIDNEY DISEASE (HCC): ICD-10-CM

## 2021-08-17 DIAGNOSIS — Z79.899 LONG TERM USE OF DRUG: ICD-10-CM

## 2021-08-17 LAB
ALBUMIN SERPL-MCNC: 4.5 GM/DL (ref 3.4–5)
ALP BLD-CCNC: 75 IU/L (ref 40–129)
ALT SERPL-CCNC: 19 U/L (ref 10–40)
ANION GAP SERPL CALCULATED.3IONS-SCNC: 6 MMOL/L (ref 4–16)
AST SERPL-CCNC: 23 IU/L (ref 15–37)
BASOPHILS ABSOLUTE: 0.1 K/CU MM
BASOPHILS RELATIVE PERCENT: 1 % (ref 0–1)
BILIRUB SERPL-MCNC: 0.4 MG/DL (ref 0–1)
BUN BLDV-MCNC: 14 MG/DL (ref 6–23)
CALCIUM SERPL-MCNC: 9.6 MG/DL (ref 8.3–10.6)
CHLORIDE BLD-SCNC: 95 MMOL/L (ref 99–110)
CO2: 29 MMOL/L (ref 21–32)
CREAT SERPL-MCNC: 1.2 MG/DL (ref 0.6–1.1)
DIFFERENTIAL TYPE: ABNORMAL
EOSINOPHILS ABSOLUTE: 0.1 K/CU MM
EOSINOPHILS RELATIVE PERCENT: 0.8 % (ref 0–3)
GFR AFRICAN AMERICAN: 54 ML/MIN/1.73M2
GFR NON-AFRICAN AMERICAN: 44 ML/MIN/1.73M2
GLUCOSE BLD-MCNC: 94 MG/DL (ref 70–99)
HCT VFR BLD CALC: 45.5 % (ref 37–47)
HEMOGLOBIN: 14.5 GM/DL (ref 12.5–16)
IMMATURE NEUTROPHIL %: 0.1 % (ref 0–0.43)
LYMPHOCYTES ABSOLUTE: 3.1 K/CU MM
LYMPHOCYTES RELATIVE PERCENT: 42.3 % (ref 24–44)
MCH RBC QN AUTO: 29.4 PG (ref 27–31)
MCHC RBC AUTO-ENTMCNC: 31.9 % (ref 32–36)
MCV RBC AUTO: 92.3 FL (ref 78–100)
MONOCYTES ABSOLUTE: 0.8 K/CU MM
MONOCYTES RELATIVE PERCENT: 10.5 % (ref 0–4)
NUCLEATED RBC %: 0 %
PDW BLD-RTO: 13.2 % (ref 11.7–14.9)
PLATELET # BLD: 259 K/CU MM (ref 140–440)
PMV BLD AUTO: 10.6 FL (ref 7.5–11.1)
POTASSIUM SERPL-SCNC: 3.6 MMOL/L (ref 3.5–5.1)
RBC # BLD: 4.93 M/CU MM (ref 4.2–5.4)
SEGMENTED NEUTROPHILS ABSOLUTE COUNT: 3.3 K/CU MM
SEGMENTED NEUTROPHILS RELATIVE PERCENT: 45.3 % (ref 36–66)
SODIUM BLD-SCNC: 130 MMOL/L (ref 135–145)
TOTAL IMMATURE NEUTOROPHIL: 0.01 K/CU MM
TOTAL NUCLEATED RBC: 0 K/CU MM
TOTAL PROTEIN: 7.7 GM/DL (ref 6.4–8.2)
WBC # BLD: 7.3 K/CU MM (ref 4–10.5)

## 2021-08-17 PROCEDURE — 85025 COMPLETE CBC W/AUTO DIFF WBC: CPT

## 2021-08-17 PROCEDURE — 36415 COLL VENOUS BLD VENIPUNCTURE: CPT

## 2021-08-17 PROCEDURE — 80053 COMPREHEN METABOLIC PANEL: CPT

## 2021-08-28 DIAGNOSIS — E87.1 HYPONATREMIA: Primary | ICD-10-CM

## 2021-09-09 ENCOUNTER — HOSPITAL ENCOUNTER (OUTPATIENT)
Age: 71
Discharge: HOME | End: 2021-09-09
Payer: MEDICARE

## 2021-09-09 DIAGNOSIS — E87.1 HYPONATREMIA: ICD-10-CM

## 2021-09-09 LAB
ANION GAP SERPL CALCULATED.3IONS-SCNC: 11 MMOL/L (ref 4–16)
CHLORIDE BLD-SCNC: 98 MMOL/L (ref 99–110)
CO2: 30 MMOL/L (ref 21–32)
POTASSIUM SERPL-SCNC: 3.9 MMOL/L (ref 3.5–5.1)
SODIUM BLD-SCNC: 139 MMOL/L (ref 135–145)

## 2021-09-09 PROCEDURE — 80051 ELECTROLYTE PANEL: CPT

## 2021-09-09 PROCEDURE — 36415 COLL VENOUS BLD VENIPUNCTURE: CPT

## 2021-12-10 ENCOUNTER — HOSPITAL ENCOUNTER (OUTPATIENT)
Dept: GENERAL RADIOLOGY | Age: 71
Discharge: HOME | End: 2021-12-10
Payer: MEDICARE

## 2021-12-10 ENCOUNTER — HOSPITAL ENCOUNTER (OUTPATIENT)
Age: 71
Discharge: HOME | End: 2021-12-10
Payer: MEDICARE

## 2021-12-10 ENCOUNTER — OFFICE VISIT (OUTPATIENT)
Dept: INTERNAL MEDICINE CLINIC | Age: 71
End: 2021-12-10
Payer: MEDICARE

## 2021-12-10 VITALS
BODY MASS INDEX: 35.2 KG/M2 | WEIGHT: 219 LBS | SYSTOLIC BLOOD PRESSURE: 136 MMHG | DIASTOLIC BLOOD PRESSURE: 78 MMHG | HEART RATE: 84 BPM | TEMPERATURE: 96.8 F | HEIGHT: 66 IN | OXYGEN SATURATION: 95 %

## 2021-12-10 DIAGNOSIS — Z85.3 PERSONAL HISTORY OF BREAST CANCER: ICD-10-CM

## 2021-12-10 DIAGNOSIS — G89.29 CHRONIC PAIN OF RIGHT KNEE: ICD-10-CM

## 2021-12-10 DIAGNOSIS — M25.561 CHRONIC PAIN OF RIGHT KNEE: ICD-10-CM

## 2021-12-10 DIAGNOSIS — Z86.010 HISTORY OF COLON POLYPS: ICD-10-CM

## 2021-12-10 DIAGNOSIS — M25.561 CHRONIC PAIN OF RIGHT KNEE: Primary | ICD-10-CM

## 2021-12-10 DIAGNOSIS — G89.29 CHRONIC PAIN OF RIGHT KNEE: Primary | ICD-10-CM

## 2021-12-10 PROCEDURE — 73562 X-RAY EXAM OF KNEE 3: CPT

## 2021-12-10 PROCEDURE — G8427 DOCREV CUR MEDS BY ELIG CLIN: HCPCS | Performed by: FAMILY MEDICINE

## 2021-12-10 PROCEDURE — G8484 FLU IMMUNIZE NO ADMIN: HCPCS | Performed by: FAMILY MEDICINE

## 2021-12-10 PROCEDURE — 99213 OFFICE O/P EST LOW 20 MIN: CPT | Performed by: FAMILY MEDICINE

## 2021-12-10 PROCEDURE — G8399 PT W/DXA RESULTS DOCUMENT: HCPCS | Performed by: FAMILY MEDICINE

## 2021-12-10 PROCEDURE — 1036F TOBACCO NON-USER: CPT | Performed by: FAMILY MEDICINE

## 2021-12-10 PROCEDURE — 3017F COLORECTAL CA SCREEN DOC REV: CPT | Performed by: FAMILY MEDICINE

## 2021-12-10 PROCEDURE — G8417 CALC BMI ABV UP PARAM F/U: HCPCS | Performed by: FAMILY MEDICINE

## 2021-12-10 PROCEDURE — 1090F PRES/ABSN URINE INCON ASSESS: CPT | Performed by: FAMILY MEDICINE

## 2021-12-10 PROCEDURE — 4040F PNEUMOC VAC/ADMIN/RCVD: CPT | Performed by: FAMILY MEDICINE

## 2021-12-10 PROCEDURE — 1123F ACP DISCUSS/DSCN MKR DOCD: CPT | Performed by: FAMILY MEDICINE

## 2021-12-10 RX ORDER — IBUPROFEN 200 MG
200 TABLET ORAL EVERY 6 HOURS PRN
COMMUNITY
End: 2022-07-20

## 2021-12-10 ASSESSMENT — ENCOUNTER SYMPTOMS
ABDOMINAL PAIN: 0
NAUSEA: 0
BACK PAIN: 0
COUGH: 0
SHORTNESS OF BREATH: 0

## 2021-12-10 NOTE — PROGRESS NOTES
Burton Wilkinson (:  1950) is a 70 y.o. female,Established patient, here for evaluation of the following chief complaint(s): Other (right knee pain for the past couple months)         ASSESSMENT/PLAN:  1. Chronic pain of right knee  -     XR KNEE RIGHT (3 VIEWS); Future  Continue medications  2. Personal history of breast cancer  -     DARRYL BENJA DIGITAL DIAGNOSTIC UNILATERAL RIGHT; Future  3. History of colon polyps  -     External Referral To Proctology    Covid booster obtained last week  Persist RTO or call  On this date 12/10/2021 I have spent 20 minutes reviewing previous notes, test results and face to face with the patient discussing the diagnosis and importance of compliance with the treatment plan as well as documenting on the day of the visit. Return if symptoms worsen or fail to improve. Subjective   SUBJECTIVE/OBJECTIVE:  HPI: This 69 yo F here for the following  Patient Active Problem List    Diagnosis Date Noted    Pedal edema     Lymphedema of right upper extremity     Chemotherapy-induced neuropathy ([facility] [address]     Ductal carcinoma in situ (DCIS) of right breast 2017    DCIS (ductal carcinoma in situ) of breast 2014     - passed away in Oct. She is very tearful and it has been hard for her to accept. -R knee pain. Pt has noticed some swelling in the anterior knee. She has noticed for 2 to 3 months and now it has flared up. Pt has used  IBU and this helps. She states it was worse when she was going up and down stairs. No injury  -Personal history of breast cancer. L mastectomy. Due for mammogram  -Hx of colon polyps. Fit test -neg. Pt would like to see Dr. Arianne Montoya for a C-scope      Review of Systems   Constitutional: Negative for diaphoresis and fever. Respiratory: Negative for cough and shortness of breath. Cardiovascular: Negative for chest pain. Gastrointestinal: Negative for abdominal pain and nausea.    Musculoskeletal: Positive for arthralgias (R knee). Negative for back pain. Neurological: Negative for dizziness and headaches. Allergies   Allergen Reactions    Penicillins     Tape Claudia Dushore Tape] Rash     Current Outpatient Medications   Medication Sig Dispense Refill    ibuprofen (ADVIL;MOTRIN) 200 MG tablet Take 200 mg by mouth every 6 hours as needed for Pain      spironolactone-hydrochlorothiazide (ALDACTAZIDE) 50-50 MG per tablet Take 1 tablet by mouth daily 90 tablet 3    APPLE CIDER VINEGAR PO Take 1 tablet by mouth daily      Pyridoxine HCl (VITAMIN B-6) 100 MG tablet Take 100 mg by mouth daily      vitamin B-12 (CYANOCOBALAMIN) 1000 MCG tablet Take 1,000 mcg by mouth daily      vitamin B-1 (THIAMINE) 100 MG tablet Take 100 mg by mouth daily      MULTIPLE VITAMIN PO Take  by mouth. No current facility-administered medications for this visit. Vitals:    12/10/21 0821   BP: 136/78   Site: Right Upper Arm   Position: Sitting   Cuff Size: Large Adult   Pulse: 84   Temp: 96.8 °F (36 °C)   SpO2: 95%   Weight: 219 lb (99.3 kg)   Height: 5' 6\" (1.676 m)     Objective   Physical Exam  Vitals reviewed. Constitutional:       General: She is not in acute distress. Cardiovascular:      Rate and Rhythm: Normal rate and regular rhythm. Pulmonary:      Effort: Pulmonary effort is normal. No respiratory distress. Breath sounds: Normal breath sounds. Abdominal:      General: Bowel sounds are normal.      Palpations: Abdomen is soft. Tenderness: There is no abdominal tenderness. Musculoskeletal:         General: Tenderness (R knee- medial anterior knee pain) present. Cervical back: Neck supple. Right lower leg: No edema. Left lower leg: No edema. Neurological:      Mental Status: She is alert and oriented to person, place, and time. Psychiatric:      Comments: tearful                An electronic signature was used to authenticate this note.     --Luis Needs, DO

## 2021-12-15 ENCOUNTER — TELEPHONE (OUTPATIENT)
Dept: INTERNAL MEDICINE CLINIC | Age: 71
End: 2021-12-15

## 2021-12-30 ENCOUNTER — HOSPITAL ENCOUNTER (OUTPATIENT)
Dept: WOMENS IMAGING | Age: 71
Discharge: HOME | End: 2021-12-30
Payer: MEDICARE

## 2021-12-30 ENCOUNTER — HOSPITAL ENCOUNTER (OUTPATIENT)
Dept: ULTRASOUND IMAGING | Age: 71
End: 2021-12-30
Payer: MEDICARE

## 2021-12-30 DIAGNOSIS — Z85.3 PERSONAL HISTORY OF BREAST CANCER: ICD-10-CM

## 2021-12-30 PROCEDURE — G0279 TOMOSYNTHESIS, MAMMO: HCPCS

## 2022-06-22 ENCOUNTER — TELEPHONE (OUTPATIENT)
Dept: INTERNAL MEDICINE CLINIC | Age: 72
End: 2022-06-22

## 2022-06-22 NOTE — TELEPHONE ENCOUNTER
Received a Nurse Triage call from [facility] ([facility]). Spoke with patient. Patient states she received a Covid Booster Vaccine on 06/9/22. States she was not feeling well after she received the vaccine. Patient states she then had a fall last Tuesday 06/14/22 and has been having some abdominal pain on her right side since then. Patient thinks she could have cracked ribs. I advised the patient that she should go to the ER to be evaluated and have Xray's done. Patient voiced understanding.

## 2022-06-28 ENCOUNTER — TELEPHONE (OUTPATIENT)
Dept: INTERNAL MEDICINE CLINIC | Age: 72
End: 2022-06-28

## 2022-06-28 DIAGNOSIS — K76.9 LIVER LESION: ICD-10-CM

## 2022-06-28 DIAGNOSIS — R91.8 LUNG MASS: Primary | ICD-10-CM

## 2022-06-28 DIAGNOSIS — Z85.3 PERSONAL HISTORY OF BREAST CANCER: ICD-10-CM

## 2022-06-28 NOTE — TELEPHONE ENCOUNTER
Patient is being seen for a follow up from [address] [place] [address] on 6/30. Patient was referred to oncologist, Christy Kaplan MD. Patient's brother stated he called to try and make appointment and oncologist office stated patient needs a referral prior to scheduling. [address] [place] [address] physician stated he is unable to provide referral and that PCP needed to. Patient's brother expressed concern about waiting until appointment for referral to be sent and would like to see if we can send the referral over to oncologist sooner than 6/30. Fax number for oncologist is [phone].

## 2022-06-28 NOTE — TELEPHONE ENCOUNTER
Fax referral to oncologist (see previous note). Inform patient when faxed. ([facility]. Hx of breast cancer, now with lung mass and liver lesions.  Previously being seen by Dr. Fuentes Stage (Radiation/oncologist)- last note in chart also)

## 2022-06-30 ENCOUNTER — HOSPITAL ENCOUNTER (OUTPATIENT)
Dept: CT IMAGING | Age: 72
Discharge: HOME | End: 2022-06-30
Payer: MEDICARE

## 2022-06-30 ENCOUNTER — OFFICE VISIT (OUTPATIENT)
Dept: INTERNAL MEDICINE CLINIC | Age: 72
End: 2022-06-30
Payer: MEDICARE

## 2022-06-30 VITALS
DIASTOLIC BLOOD PRESSURE: 92 MMHG | OXYGEN SATURATION: 95 % | HEART RATE: 93 BPM | SYSTOLIC BLOOD PRESSURE: 128 MMHG | WEIGHT: 209 LBS | HEIGHT: 66 IN | BODY MASS INDEX: 33.59 KG/M2

## 2022-06-30 DIAGNOSIS — R74.01 TRANSAMINITIS: ICD-10-CM
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
## 2022-06-30 DIAGNOSIS — R91.8 LUNG MASS: Primary | ICD-10-CM

## 2022-06-30 DIAGNOSIS — K76.9 LIVER LESION: ICD-10-CM

## 2022-06-30 DIAGNOSIS — R42 DIZZY SPELLS: ICD-10-CM

## 2022-06-30 DIAGNOSIS — N63.0 BREAST NODULE: ICD-10-CM

## 2022-06-30 DIAGNOSIS — R91.8 LUNG MASS: ICD-10-CM

## 2022-06-30 DIAGNOSIS — Z85.3 PERSONAL HISTORY OF BREAST CANCER: ICD-10-CM

## 2022-06-30 PROBLEM — N18.30 CHRONIC RENAL DISEASE, STAGE III (HCC): Status: ACTIVE | Noted: 2022-06-30

## 2022-06-30 PROCEDURE — 3017F COLORECTAL CA SCREEN DOC REV: CPT | Performed by: FAMILY MEDICINE

## 2022-06-30 PROCEDURE — G8399 PT W/DXA RESULTS DOCUMENT: HCPCS | Performed by: FAMILY MEDICINE

## 2022-06-30 PROCEDURE — 1123F ACP DISCUSS/DSCN MKR DOCD: CPT | Performed by: FAMILY MEDICINE

## 2022-06-30 PROCEDURE — 70450 CT HEAD/BRAIN W/O DYE: CPT

## 2022-06-30 PROCEDURE — 1036F TOBACCO NON-USER: CPT | Performed by: FAMILY MEDICINE

## 2022-06-30 PROCEDURE — G8427 DOCREV CUR MEDS BY ELIG CLIN: HCPCS | Performed by: FAMILY MEDICINE

## 2022-06-30 PROCEDURE — 99215 OFFICE O/P EST HI 40 MIN: CPT | Performed by: FAMILY MEDICINE

## 2022-06-30 PROCEDURE — G8417 CALC BMI ABV UP PARAM F/U: HCPCS | Performed by: FAMILY MEDICINE

## 2022-06-30 PROCEDURE — 1090F PRES/ABSN URINE INCON ASSESS: CPT | Performed by: FAMILY MEDICINE

## 2022-06-30 ASSESSMENT — ENCOUNTER SYMPTOMS
COUGH: 0
BACK PAIN: 1
SHORTNESS OF BREATH: 1
NAUSEA: 0
DIARRHEA: 0
ABDOMINAL PAIN: 1
CONSTIPATION: 0
BLOOD IN STOOL: 0

## 2022-06-30 ASSESSMENT — PATIENT HEALTH QUESTIONNAIRE - PHQ9: DEPRESSION UNABLE TO ASSESS: PT REFUSES

## 2022-06-30 NOTE — PROGRESS NOTES
Patient Name:  Gerry Wilkinson  Patient :  1950  Patient MRN:  [id number]     Primary Oncologist: Gilda Puckett MD  Referring Provider: James Molina DO     Date of Service: 2022      Reason for Consult:  Breast cancer      Chief Complaint:  No chief complaint on file. Patient Active Problem List:     DCIS (ductal carcinoma in situ) of breast     Ductal carcinoma in situ (DCIS) of right breast     Chemotherapy-induced neuropathy (HCC)     Pedal edema     Lymphedema of right upper extremity     Chronic renal disease, stage III [facility]) [389479]     HPI:   Burton Wilkinson is a pleasant 70 y.o. female with past medical history of breast cancer status post chemotherapy and radiation as well as mastectomy and lumpectomy with last therapy in  presented to ER at [facility] on 2022  with right upper quadrant abdominal pain for last 2 weeks. US of abdomen 2022:  Hepatomegaly. Small liver lesions in the left lobe, indeterminate. Multiphasic CT of the liver is recommended to evaluate completely. No acute abnormality otherwise   CT CAP 2022:  7 cm diameter mass crossing the right major fissure, involving the right middle lobe and right lower lobe, with additional innumerable lung nodules measuring up to about 1.5 cm, compatible with metastatic disease; unclear whether the 7 cm mass is the primary, or diffuse metastatic disease is secondary to a separate primary such as known breast cancer. Innumerable liver lesions compatible with metastatic disease   T3 8mm vertebral body lucent lesion indeterminate. 6 mm medial right breast nodule. CBC in 2021 was grossly unremarkable. LFT wnl. Creatinine was 1.2. Right breast mammogram 2021 was benign. On 2022 albumin was 3.4L, Alkaline Phosphatase 688H,   AST 196H, ALT  108H, Total Bilirubin 1.7, Direct Bilirubin 0.80H   WBC was 12.7, Hg 15.8, platelet 199. ANC 9.1, absolute monocyte 1.4.     CT head 2022:    Partially calcified 1 cm mass within or overlying the right frontal lobe with adjacent cystic component or encephalomalacia within the right frontal lobe.   No significant vasogenic edema, mass effect or midline shift.   RECOMMENDATIONS:  MRI of the brain with and without contrast is suggested for further evaluation. I ordered CA 27-29 today and scheduled for IR biopsy of liver leions or lung lesions. I scheduled for MRI of brain and prescribed low dose ativan which she may take prior to MRI of brain, since she is claustrophobic. Oncology History:   She  initially was diagnosed with Stage I left breast infiltrating ductal carcinoma in July 2002, ER/WY and HER-2/marleny positive by immunohistochemistry 3+. S/P lumpectomy and radiation therapy. She was treated only with Tamoxifen between June 2002 and June 2007. In August 2011 she had local recurrence, for which she underwent left skin-sparing mastectomy with sentinel node biopsy and reconstruction [Silicone Implant]. Final path showed DCIS only and ER/WY were positive. FISH was amplified. Postop she was offered tamoxifen again or a different hormonal therapy, but she chose not to pursue that. In September 2014, she had an abnormal mammogram and a subsequent needle biopsy confirmed DCIS of the right breast, which was triple-negative, S/P lumpectomy on 10/7/14, Final path showed infiltrating ductal carcinoma, which was completely excised (tumor size 2 cm), ER/WY negative, HER-2/marleny also negative by FISH). thus Triple Negative. Ki-67 was 53 percent  unfavorable. She thus received 4 doses of adjuvant chemo with A/C regimen between November 2014 and January 2015. Subsequently she was initiated on Taxol weekly and received 12 doses between Feb and May 2015. RT to Right Breast 6100 Rads completed July 2015. In continued Clinical Remission since then  On no therapy. PERSONAL HISTORY:  She is a nonsmoker. She drinks only socially.   She lives with her  and has one son who is 24years old. She is a retired teacher. She has been    PAST MEDICAL HISTORY:  1. Hypertension., 2. B/L Breast cancer as mentioned above, 3. No diabetes, heart disease, or any other medical issues. , 4. She had a Pap smear by Dr. Trena Toribio in  which apparently was negative.    , 5. Right Mammogram Oct 2016 Neg  , 6. She had colonoscopy in Aug 2016 per Dr. Hoda Hernandez. ?Neg  ,7.  right breast mammogram on 2016, showed BI-RADS category 2, negative for any malignancy. 8. Her DEXAscan was negative. PAST SURGICAL HISTORY:  1.  in 1993, 2. lumpectomy in 2002, 3. Left mastectomy in 2011, 4. right lumpectomy 2014. FAMILY HISTORY:  Many of her aunt, uncles, and cousins on the paternal side have various types of cancer, including breast cancer in one of her aunts around the age of 36, another aunt had ovarian cancer in her forties, and uncles had throat cancer, one of her cousins also possibly had breast cancer. PERSONAL HISTORY:  She is a nonsmoker. She drinks only socially. She lives with her  and has one son who is 24years old. 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 Previous Therapy  Doxorubicin + Cyclophosphamide (AC) + Paclitaxel (Weekly) (AC only: Part 1 of 2) Cycle Length: 21 Number Cycles: 4 Start: Brittany Been on 2014 Assoc Dx: Primary malignant neoplasm of female breast (disorder) LOT: Adjuvant     Filgrastim Subcutaneous, 300 mcg  Cyclophosphamide IV,  mg, Dose modified  Doxorubicin IV,  mg, Dose modified  Doxorubicin + Cyclophosphamide (AC) + Paclitaxel (Weekly) (Paclitaxel only: Part 2 of 2) Cycle Length: 7 Number Cycles: 12 Start: Brittany Been on 2015 Assoc Dx: Primary malignant neoplasm of female breast (disorder) LOT: Adjuvant   Paclitaxel IV,  mg, Dose modified  ?????? Assessment & Plan  ???????1.    Infiltrating ductal carcinoma of the right breast Triple Negative:  Right lumpectomy 2014, A/C, followed by Taxol completed in May 2105, followed by RT 6100 rads, completed in July of 2015. Continued clinical remission. No evidence of any recurrence at this time. Her last mammogram was October 4, 2017, BI-RADS category 2, benign. She will need yearly mammograms and continue to watch for recurrence. Since she is following with Dr. Brandon Perry, Dr. Sarah Glover, and Dr. Angelita Kendall on a regular basis, I suggested that she get her mammogram through them and we will see her on a p.r.n. basis or she can come back and see one of my partners after my senior care. She chose to go with the former route, if needed. Also History of left breast cancer in continued clinical remission. 2.   Survivorship issues:  Her neuropathy has improved significantly and minimal changes, which are not bothering her much. I suggested she try to taper herself off the gabapentin over the next four to five weeks and if the symptoms come back, she can continue the same and consider increasing the dose as needed, to be monitored by Dr. Brandon Perry. Fingernail changes resolved completely. She is continuing self-breast exam.  Will need a mammogram in a year. Follow with Dr. Sarah Glover regarding ongoing pelvic exam and Pap smear, [place], and GYN evaluation on a yearly basis. Her obesity is also an issue. She is trying to lose some weight, though not very successful. The risk for multiple medical issues, including diabetes, hypertension, heart disease, other vascular problems, FELICIANO, arthralgias, et Jake Reveal, and also malignancy. She is to continue her preventive checkups through Dr. Brandon Perry in the future.         Past Medical History:   Past Medical History:   Diagnosis Date    [facility] [address]     Cancer ([facility] [address]     Bilateral Breast    Chemotherapy-induced neuropathy (HCC)     fingers and toes    Ductal carcinoma in situ (DCIS) of right breast     History of external beam radiation therapy 2015    Right Breast per  at [facility] History of external beam radiation therapy 2002    [facility]    Lymphedema of right upper extremity     Pedal edema       Past Surgery History:   Past Surgical History:   Procedure Laterality Date    BREAST BIOPSY Left 2002    BREAST BIOPSY Right 2014    BREAST LUMPECTOMY Left 2002    BREAST LUMPECTOMY Right 2014    Right Lumpectomy with Geff Node Bx    BREAST SURGERY Left 2011    Mastectomy with Geff Bx and Reconstruction    TUNNELED VENOUS PORT PLACEMENT       Social History:   She denied smoking history. She drinks EtOH occasionally. She has one child. Family History: Many of her aunt, uncles, and cousins on the paternal side have various types of cancer, including breast cancer in one of her aunts around the age of 36, another aunt had ovarian cancer in her forties, and uncles had throat cancer, one of her cousins also possibly had breast cancer. Allergies   Allergen Reactions    Penicillins     Tape Maxx Dong Tape] Rash       Current Outpatient Medications on File Prior to Visit   Medication Sig Dispense Refill    ibuprofen (ADVIL;MOTRIN) 200 MG tablet Take 200 mg by mouth every 6 hours as needed for Pain      spironolactone-hydrochlorothiazide (ALDACTAZIDE) 50-50 MG per tablet Take 1 tablet by mouth daily 90 tablet 3    APPLE CIDER VINEGAR PO Take 1 tablet by mouth daily (Patient not taking: Reported on 6/30/2022)      Pyridoxine HCl (VITAMIN B-6) 100 MG tablet Take 100 mg by mouth daily      vitamin B-12 (CYANOCOBALAMIN) 1000 MCG tablet Take 1,000 mcg by mouth daily      vitamin B-1 (THIAMINE) 100 MG tablet Take 100 mg by mouth daily      MULTIPLE VITAMIN PO Take  by mouth. No current facility-administered medications on file prior to visit. Review of Systems:    Constitutional:  No weight loss, No fever, No chills, No night sweats. Energy level good. Eyes:  No diplopia, No transient or permanent loss of vision, No scotomata.   ENT / Mouth: No epistaxis, No dysphagia, No hoarseness, No oral ulcers, No gingival bleeding. No sore throat, No postnasal drip, No nasal drip, No mouth pain, No sinus pain, No tinnitus, Normal hearing. Cardiovascular:  No chest pain, No palpitations, No syncope, No upper extremity edema, No lower extremity edema, No calf discomfort. Respiratory:  No cough. No hemoptysis, No pleurisy, No wheezing, No dyspnea. Breast:  S/p left mastectomy and right lumpectomy. Gastrointestinal:  intermittent RUQ abdominal pain, No nausea, No vomiting, No constipation, No diarrhea, No hematochezia, No melena, No jaundice, No dyspepsia, No dysphagia. Urinary:  No dysuria, No hematuria, No urinary incontinence. Gynecological:  No vaginal discharge, No suprapubic pain, No abnormal vaginal bleeding. (Female Patients Only)  Musculoskeletal:  No muscle pain, No swollen joints, No joint redness, No bone pain, No spine tenderness. Skin:  No rash, No nodules, No pruritus, No lesions. Neurologic:  No confusion, No seizures, No syncope, No tremor, No speech change, No headache, No hiccups, No abnormal gait, No sensory changes, No weakness. Psychiatric:  No depression, No anxiety, Concentration normal.  Endocrine:  No polyuria, No polydipsia, No hot flashes, No thyroid symptoms. Hematologic:  No epistaxis, No gingival bleeding, No petechiae, No ecchymosis. Lymphatic:  No lymphadenopathy, No lymphedema. Allergy / Immunologic:  No eczema, No frequent mucous infections, No frequent respiratory infections, No recurrent urticarial, No frequent skin infections. Vital Signs: There were no vitals taken for this visit.      Physical Exam:  CONSTITUTIONAL: awake, alert, cooperative, no apparent distress   EYES: pupils equal, round and reactive to light, sclera clear and conjunctiva normal  ENT: Normocephalic, without obvious abnormality, atraumatic  NECK: supple, symmetrical, no jugular venous distension and no carotid bruits HEMATOLOGIC/LYMPHATIC: no cervical, supraclavicular or axillary lymphadenopathy   LUNGS: no increased work of breathing and clear to auscultation   BREAST:S/p left mastectomy and right lumpectomy. CARDIOVASCULAR: regular rate and rhythm, normal S1 and S2, no murmur noted  ABDOMEN: normal bowel sounds x 4, soft, non-distended, tenderness to RUQ abdomen. MUSCULOSKELETAL: full range of motion noted, tone is normal  NEUROLOGIC: awake, alert, oriented to name, place and time. Motor skills grossly intact. SKIN: Normal skin color, texture, turgor and no jaundice.  appears intact   EXTREMITIES: no LE edema        Labs:  Hematology:  Lab Results   Component Value Date    WBC 7.3 08/17/2021    RBC 4.93 08/17/2021    HGB 14.5 08/17/2021    HCT 45.5 08/17/2021    MCV 92.3 08/17/2021    MCH 29.4 08/17/2021    MCHC 31.9 (L) 08/17/2021    RDW 13.2 08/17/2021     08/17/2021    MPV 10.6 08/17/2021    SEGSPCT 45.3 08/17/2021    EOSRELPCT 0.8 08/17/2021    BASOPCT 1.0 08/17/2021    LYMPHOPCT 42.3 08/17/2021    MONOPCT 10.5 (H) 08/17/2021    SEGSABS 3.3 08/17/2021    EOSABS 0.1 08/17/2021    BASOSABS 0.1 08/17/2021    LYMPHSABS 3.1 08/17/2021    MONOSABS 0.8 08/17/2021    DIFFTYPE AUTOMATED DIFFERENTIAL 08/17/2021     Lab Results   Component Value Date    ESR 34 (H) 08/14/2017     Chemistry:  Lab Results   Component Value Date     09/09/2021    K 3.9 09/09/2021    CL 98 (L) 09/09/2021    CO2 30 09/09/2021    BUN 14 08/17/2021    CREATININE 1.2 (H) 08/17/2021    GLUCOSE 94 08/17/2021    CALCIUM 9.6 08/17/2021    PROT 7.7 08/17/2021    LABALBU 4.5 08/17/2021    BILITOT 0.4 08/17/2021    ALKPHOS 75 08/17/2021    AST 23 08/17/2021    ALT 19 08/17/2021    LABGLOM 44 (L) 08/17/2021    GFRAA 54 (L) 08/17/2021     Lab Results   Component Value Date     10/03/2017     Lab Results   Component Value Date    TSHHS 2.631 08/26/2013     Immunology:  Lab Results   Component Value Date    PROT 7.7 08/17/2021    ALBUMINELP 3.7 10/03/2017    LABALPH 0.2 10/03/2017    LABALPH 0.6 10/03/2017    LABBETA 1.1 10/03/2017    GAMGLOB 1.4 10/03/2017     Tumor Markers:  Lab Results   Component Value Date     32.6 10/30/2014    CEA 1.3 06/02/2017    LABCA2 23.0 06/02/2017      Observations:  No data recorded     Assessment & Plan:  1. She has history of bilateral breast cancer as above. I reviewed CT findings. I referred for IR biopsy of liver lesion or lung lesion. We will discuss option of treatment depending on biopsy result. 2. I order MRI of brain and prescribe ativan which she may take half hour prior to MRI. 3. She has elevated LFT's related metastatic disease. 4. I referred for genetic counseling. RTC in 2 - 3 weeks or sooner. All of her questions have been answered for today. I have discussed the above stated plan with the patient and they verbalized understanding and agreed with the plan. Thank you for allowing us to participate in this patient's care.

## 2022-06-30 NOTE — LETTER
[address] [place] [address] Internal and Family Medicine  [address] [place] [id number]  Phone: [phone]  Fax: [phone]             June 30, 2022     Patient: Devin Wilkinson   YOB: 1950   Date of Visit: 6/30/2022       To Whom It May Concern: It is my medical opinion that [facility] requires a disability parking placard for the following reasons:  She cannot walk 200 feet without stopping to rest.  Duration of need: 5 years    If you have any questions or concerns, please don't hesitate to call.     Sincerely,        Dr. Alecia Juárez

## 2022-06-30 NOTE — PROGRESS NOTES
Burton Wilkinson (:  1950) is a 70 y.o. female,established patient, here for evaluation of the following chief complaint(s):  ED Follow-up ([facility]- lung and liver mass)         ASSESSMENT/PLAN:  1. Marino Esquivel MD, Hematology Oncology, [address] [place] [address]; Future    2. Liver lesion  - Lu Willis MD, Hematology Oncology, [facility]    3. Alvin Villarreal MD, Hematology Oncology, [facility]    4. Dizzy spells  - CT HEAD WO CONTRAST- Today    5. Fall, subsequent encounter  - CT HEAD WO CONTRAST- Today    6. Personal history of breast cancer  - [place] - Justin Doherty MD, Hematology Oncology, [place]    7. Breast nodule  - Lu Willis MD, Hematology Oncology, [facility] labs reviewed: CBC, CMP,Ua  CT abdomen pelvis, and chest reviewed  US RUQ reviewed  Worse to ER  On this date 2022 I have spent 40 minutes reviewing previous notes, test results and face to face with the patient discussing the diagnosis and importance of compliance with the treatment plan as well as documenting on the day of the visit. Return for Follow up pending. 22 CT ABD/Pelvis No Contrast, CT Chest No Contrast      Impression  Performed by [facility]    7 cm diameter mass crossing the right major fissure, involving the right middle lobe and right lower lobe, with additional innumerable lung nodules measuring up to about 1.5 cm, compatible with metastatic disease; unclear whether the 7 cm mass is the primary, or diffuse metastatic disease is secondary to a separate primary such as known breast cancer. Innumerable liver lesions compatible with metastatic disease     T3 8mm vertebral body lucent lesion indeterminate. 6 mm medial right breast nodule    22 US- RUQ ABD Limited  Impression  Performed by [facility]  Hepatomegaly. Small liver lesions in the left lobe, indeterminate. Multiphasic CT of the liver is recommended to evaluate completely. No acute abnormality otherwise          Lab Results   Component Value Date    WBC 7.3 08/17/2021    HGB 14.5 08/17/2021    HCT 45.5 08/17/2021    MCV 92.3 08/17/2021     08/17/2021       Lab Results   Component Value Date     09/09/2021    K 3.9 09/09/2021    CL 98 (L) 09/09/2021    CO2 30 09/09/2021    BUN 14 08/17/2021    CREATININE 1.2 (H) 08/17/2021    GLUCOSE 94 08/17/2021    CALCIUM 9.6 08/17/2021    PROT 7.7 08/17/2021    LABALBU 4.5 08/17/2021    BILITOT 0.4 08/17/2021    ALKPHOS 75 08/17/2021    AST 23 08/17/2021    ALT 19 08/17/2021    LABGLOM 44 (L) 08/17/2021    GFRAA 54 (L) 08/17/2021         Subjective   SUBJECTIVE/OBJECTIVE:    HISTORY OF PRESENT ILLNESS:  This is a 70 y.o. female here for the following:  Patient Active Problem List    Diagnosis Date Noted    Chronic renal disease, stage III ([facility] [address] [[id number]] 06/30/2022    Pedal edema     Lymphedema of right upper extremity     Chemotherapy-induced neuropathy ([facility] [address]     Ductal carcinoma in situ (DCIS) of right breast 08/16/2017    DCIS (ductal carcinoma in situ) of breast 09/18/2014        Patient was seen at [facility] 6/27/22- She had RUQ pain. fatigue, dizziness, dark urine, back pain. She fell at home. Work up in the ED revealed a 7 cm  R lung mass with multiple lung nodules compatible with metastatic disease. Unknown primary, but she has a personal history of breast cancer. 6 mm nodule seen in R breast. She last seen Dr. Devin Camejo - Rad/Oc in 2020. Previous patient of Dr. Jonh Hooks  +Liver lesions and transaminitis. Pt can still have discomfort in RUQ of abdomen  -T 3   m lucent lesion of the vertebrae  She has dizziness and she had a fall on 6/14 at home  -Patient prescribed Oxycodone. She is only using Tylenol at this time  -Last Covid booster 6/9/22        Review of Systems   Constitutional: Positive for fatigue. Negative for diaphoresis and fever.    Eyes: Negative for  BREAST SURGERY Left 2011    Mastectomy with [place] Bx and Reconstruction    TUNNELED VENOUS PORT PLACEMENT       Social History     Tobacco Use    Smoking status: Never Smoker    Smokeless tobacco: Never Used   Vaping Use    Vaping Use: Never used   Substance Use Topics    Alcohol use: No    Drug use: No            Vitals:    06/30/22 1135   BP: (!) 128/92   Site: Right Upper Arm   Position: Sitting   Cuff Size: Medium Adult   Pulse: 93   SpO2: 95%   Weight: 209 lb (94.8 kg)   Height: 5' 6\" (1.676 m)     Objective   Physical Exam  Vitals reviewed. Constitutional:       General: She is not in acute distress. HENT:      Right Ear: Tympanic membrane normal.      Left Ear: Tympanic membrane normal.   Eyes:      General: No scleral icterus. Extraocular Movements: Extraocular movements intact. Cardiovascular:      Rate and Rhythm: Normal rate and regular rhythm. Pulmonary:      Effort: Pulmonary effort is normal. No respiratory distress. Abdominal:      Palpations: Abdomen is soft. Tenderness: There is abdominal tenderness (RUQ). Musculoskeletal:      Cervical back: Neck supple. Right lower leg: Edema present. Left lower leg: Edema present. Skin:     Findings: No rash. Neurological:      Mental Status: She is alert and oriented to person, place, and time. Mental status is at baseline. Psychiatric:         Mood and Affect: Mood normal.                An electronic signature was used to authenticate this note. --Gregorio Guillermo DO     This dictation was generated by voice recognition computer software. Although all attempts are made to edit the dictation for accuracy, there may be errors in the transcription that are not intended.

## 2022-07-01 ENCOUNTER — INITIAL CONSULT (OUTPATIENT)
Dept: ONCOLOGY | Age: 72
End: 2022-07-01
Payer: MEDICARE

## 2022-07-01 ENCOUNTER — HOSPITAL ENCOUNTER (OUTPATIENT)
Dept: INFUSION THERAPY | Age: 72
Discharge: HOME | End: 2022-07-01
Payer: MEDICARE

## 2022-07-01 VITALS
SYSTOLIC BLOOD PRESSURE: 142 MMHG | WEIGHT: 209 LBS | DIASTOLIC BLOOD PRESSURE: 94 MMHG | HEIGHT: 67 IN | HEART RATE: 95 BPM | TEMPERATURE: 97.5 F | RESPIRATION RATE: 16 BRPM | BODY MASS INDEX: 32.8 KG/M2 | OXYGEN SATURATION: 97 %

## 2022-07-01 DIAGNOSIS — C50.912 MALIGNANT NEOPLASM OF LEFT BREAST IN FEMALE, ESTROGEN RECEPTOR NEGATIVE, UNSPECIFIED SITE OF BREAST (HCC): ICD-10-CM

## 2022-07-01 DIAGNOSIS — Z17.1 MALIGNANT NEOPLASM OF LEFT BREAST IN FEMALE, ESTROGEN RECEPTOR NEGATIVE, UNSPECIFIED SITE OF BREAST (HCC): ICD-10-CM

## 2022-07-01 DIAGNOSIS — C50.912 MALIGNANT NEOPLASM OF LEFT BREAST IN FEMALE, ESTROGEN RECEPTOR NEGATIVE, UNSPECIFIED SITE OF BREAST (HCC): Primary | ICD-10-CM

## 2022-07-01 DIAGNOSIS — Z17.1 MALIGNANT NEOPLASM OF LEFT BREAST IN FEMALE, ESTROGEN RECEPTOR NEGATIVE, UNSPECIFIED SITE OF BREAST (HCC): Primary | ICD-10-CM

## 2022-07-01 LAB
ALBUMIN SERPL-MCNC: 3.3 GM/DL (ref 3.4–5)
ALP BLD-CCNC: 937 IU/L (ref 40–128)
ALT SERPL-CCNC: 120 U/L (ref 10–40)
ANION GAP SERPL CALCULATED.3IONS-SCNC: 13 MMOL/L (ref 4–16)
AST SERPL-CCNC: 235 IU/L (ref 15–37)
BASOPHILS ABSOLUTE: 0 K/CU MM
BASOPHILS RELATIVE PERCENT: 0.4 % (ref 0–1)
BILIRUB SERPL-MCNC: 1.4 MG/DL (ref 0–1)
BUN BLDV-MCNC: 13 MG/DL (ref 6–23)
CALCIUM SERPL-MCNC: 9.8 MG/DL (ref 8.3–10.6)
CHLORIDE BLD-SCNC: 95 MMOL/L (ref 99–110)
CO2: 26 MMOL/L (ref 21–32)
CREAT SERPL-MCNC: 0.9 MG/DL (ref 0.6–1.1)
DIFFERENTIAL TYPE: ABNORMAL
EOSINOPHILS ABSOLUTE: 0 K/CU MM
EOSINOPHILS RELATIVE PERCENT: 0.4 % (ref 0–3)
GFR AFRICAN AMERICAN: >60 ML/MIN/1.73M2
GFR NON-AFRICAN AMERICAN: >60 ML/MIN/1.73M2
GLUCOSE BLD-MCNC: 95 MG/DL (ref 70–99)
HCT VFR BLD CALC: 43.4 % (ref 37–47)
HEMOGLOBIN: 14.9 GM/DL (ref 12.5–16)
LYMPHOCYTES ABSOLUTE: 2.1 K/CU MM
LYMPHOCYTES RELATIVE PERCENT: 21.6 % (ref 24–44)
MCH RBC QN AUTO: 29.6 PG (ref 27–31)
MCHC RBC AUTO-ENTMCNC: 34.3 % (ref 32–36)
MCV RBC AUTO: 86.3 FL (ref 78–100)
MONOCYTES ABSOLUTE: 1.2 K/CU MM
MONOCYTES RELATIVE PERCENT: 12.5 % (ref 0–4)
PDW BLD-RTO: 15.8 % (ref 11.7–14.9)
PLATELET # BLD: 293 K/CU MM (ref 140–440)
PMV BLD AUTO: 10.6 FL (ref 7.5–11.1)
POTASSIUM SERPL-SCNC: 3.9 MMOL/L (ref 3.5–5.1)
RBC # BLD: 5.03 M/CU MM (ref 4.2–5.4)
SEGMENTED NEUTROPHILS ABSOLUTE COUNT: 6.3 K/CU MM
SEGMENTED NEUTROPHILS RELATIVE PERCENT: 65.1 % (ref 36–66)
SODIUM BLD-SCNC: 134 MMOL/L (ref 135–145)
TOTAL PROTEIN: 8 GM/DL (ref 6.4–8.2)
WBC # BLD: 9.6 K/CU MM (ref 4–10.5)

## 2022-07-01 PROCEDURE — 1036F TOBACCO NON-USER: CPT | Performed by: INTERNAL MEDICINE

## 2022-07-01 PROCEDURE — G8399 PT W/DXA RESULTS DOCUMENT: HCPCS | Performed by: INTERNAL MEDICINE

## 2022-07-01 PROCEDURE — 1090F PRES/ABSN URINE INCON ASSESS: CPT | Performed by: INTERNAL MEDICINE

## 2022-07-01 PROCEDURE — 36415 COLL VENOUS BLD VENIPUNCTURE: CPT

## 2022-07-01 PROCEDURE — 85025 COMPLETE CBC W/AUTO DIFF WBC: CPT

## 2022-07-01 PROCEDURE — 1123F ACP DISCUSS/DSCN MKR DOCD: CPT | Performed by: INTERNAL MEDICINE

## 2022-07-01 PROCEDURE — 3017F COLORECTAL CA SCREEN DOC REV: CPT | Performed by: INTERNAL MEDICINE

## 2022-07-01 PROCEDURE — 86300 IMMUNOASSAY TUMOR CA 15-3: CPT

## 2022-07-01 PROCEDURE — 80053 COMPREHEN METABOLIC PANEL: CPT

## 2022-07-01 PROCEDURE — G8417 CALC BMI ABV UP PARAM F/U: HCPCS | Performed by: INTERNAL MEDICINE

## 2022-07-01 PROCEDURE — 99205 OFFICE O/P NEW HI 60 MIN: CPT | Performed by: INTERNAL MEDICINE

## 2022-07-01 PROCEDURE — G8427 DOCREV CUR MEDS BY ELIG CLIN: HCPCS | Performed by: INTERNAL MEDICINE

## 2022-07-01 RX ORDER — LORAZEPAM 0.5 MG/1
0.5 TABLET ORAL EVERY 8 HOURS PRN
Qty: 2 TABLET | Refills: 0 | Status: SHIPPED | OUTPATIENT
Start: 2022-07-01 | End: 2022-07-03

## 2022-07-01 RX ORDER — OXYCODONE HYDROCHLORIDE 5 MG/1
5 TABLET ORAL EVERY 6 HOURS PRN
COMMUNITY
Start: 2022-06-28 | End: 2022-07-03

## 2022-07-01 ASSESSMENT — PATIENT HEALTH QUESTIONNAIRE - PHQ9
6. FEELING BAD ABOUT YOURSELF - OR THAT YOU ARE A FAILURE OR HAVE LET YOURSELF OR YOUR FAMILY DOWN: 0
10. IF YOU CHECKED OFF ANY PROBLEMS, HOW DIFFICULT HAVE THESE PROBLEMS MADE IT FOR YOU TO DO YOUR WORK, TAKE CARE OF THINGS AT HOME, OR GET ALONG WITH OTHER PEOPLE: 1
9. THOUGHTS THAT YOU WOULD BE BETTER OFF DEAD, OR OF HURTING YOURSELF: 0
SUM OF ALL RESPONSES TO PHQ QUESTIONS 1-9: 9
5. POOR APPETITE OR OVEREATING: 0
SUM OF ALL RESPONSES TO PHQ9 QUESTIONS 1 & 2: 4
8. MOVING OR SPEAKING SO SLOWLY THAT OTHER PEOPLE COULD HAVE NOTICED. OR THE OPPOSITE, BEING SO FIGETY OR RESTLESS THAT YOU HAVE BEEN MOVING AROUND A LOT MORE THAN USUAL: 0
1. LITTLE INTEREST OR PLEASURE IN DOING THINGS: 2
2. FEELING DOWN, DEPRESSED OR HOPELESS: 2
3. TROUBLE FALLING OR STAYING ASLEEP: 2
4. FEELING TIRED OR HAVING LITTLE ENERGY: 3
SUM OF ALL RESPONSES TO PHQ QUESTIONS 1-9: 9
SUM OF ALL RESPONSES TO PHQ QUESTIONS 1-9: 9
7. TROUBLE CONCENTRATING ON THINGS, SUCH AS READING THE NEWSPAPER OR WATCHING TELEVISION: 0
SUM OF ALL RESPONSES TO PHQ QUESTIONS 1-9: 9

## 2022-07-01 NOTE — PROGRESS NOTES
MA Rooming Questions  Patient: Ruby Santana  MRN: [id number]    Date: 7/1/2022        New patient          PHQ-9 Total Score: 9 (7/1/2022  9:10 AM)  Thoughts that you would be better off dead, or of hurting yourself in some way: 0 (7/1/2022  9:10 AM)       PHQ-9 Given to (if applicable):               PHQ-9 Score (if applicable):                     [] Positive     []  Negative              Does question #9 need addressed (if applicable)                     [] Yes    []  No               Leandro Burns CMA

## 2022-07-04 LAB — CA 27.29: 68.6 U/ML

## 2022-07-07 NOTE — PROGRESS NOTES
Patient Name:  Shaila Wilkinson  Patient :  1950  Patient MRN:  [id number]     Primary Oncologist: Lisa Vaca MD  Referring Provider: Lajuanda Cranker, DO     Date of Service: 2022      Chief Complaint:    Chief Complaint   Patient presents with    Follow-up     She came in for follow up visit. Patient Active Problem List:     DCIS (ductal carcinoma in situ) of breast     Ductal carcinoma in situ (DCIS) of right breast     Chemotherapy-induced neuropathy (HCC)     Pedal edema     Lymphedema of right upper extremity     Chronic renal disease, stage III [facility]) [[id number]]     HPI:   Burton Wilkinson is a pleasant 70 y.o. female with past medical history of breast cancer status post chemotherapy and radiation as well as mastectomy and lumpectomy with last therapy in  presented to ER at [facility] on 2022  with right upper quadrant abdominal pain for last 2 weeks. US of abdomen 2022:  Hepatomegaly. Small liver lesions in the left lobe, indeterminate. Multiphasic CT of the liver is recommended to evaluate completely. No acute abnormality otherwise   CT CAP 2022:  7 cm diameter mass crossing the right major fissure, involving the right middle lobe and right lower lobe, with additional innumerable lung nodules measuring up to about 1.5 cm, compatible with metastatic disease; unclear whether the 7 cm mass is the primary, or diffuse metastatic disease is secondary to a separate primary such as known breast cancer. Innumerable liver lesions compatible with metastatic disease   T3 8mm vertebral body lucent lesion indeterminate. 6 mm medial right breast nodule. CBC in 2021 was grossly unremarkable. LFT wnl. Creatinine was 1.2. Right breast mammogram 2021 was benign. On 2022 albumin was 3.4L, Alkaline Phosphatase 688H,   AST 196H, ALT  108H, Total Bilirubin 1.7, Direct Bilirubin 0.80H   WBC was 12.7, Hg 15.8, platelet 594. ANC 9.1, absolute monocyte 1.4.     CT head 6/30/2022:    Partially calcified 1 cm mass within or overlying the right frontal lobe with adjacent cystic component or encephalomalacia within the right frontal lobe. No significant vasogenic edema, mass effect or midline shift. RECOMMENDATIONS:  MRI of the brain with and without contrast is suggested for further evaluation. I ordered CA 27-29 today and scheduled for IR biopsy of liver lesions or lung lesions. I scheduled for MRI of brain and prescribed low dose ativan which she may take prior to MRI of brain, since she is claustrophobic. On July 20, 2022 she came in for follow up visit. MRI of brain 7/19/2022:  Enhancing, partially cystic and solid mass within the right frontal lobe, worrisome for a brain metastasis given the history. She denied any headache but felt dizzy intermittently. I referred to RT onc for potential gamma knife at [facility] [employer] [facility] [employer] I prescribed dexamethasone. She has liver bx on July 18, 2022. Preliminary showed carcinoma. Awaiting for additional study to determine the type of the cancer. She has constipation and I prescribe laxative. She has discomfort to RUQ of abdomen and will take OTC pain medication. Once we have official path report, we will discuss about potential treatment options. RUQ abdomen discomfort. Denied any nausea, vomiting or diarrhea. No fever or chills. No chest pain, shortness of breath or palpitation. No headache. No specific bone pain. No melena or hematochezia. Denied any dysuria or hematuria. Oncology History:   She  initially was diagnosed with Stage I left breast infiltrating ductal carcinoma in July 2002, ER/SD and HER-2/marleny positive by immunohistochemistry 3+. S/P lumpectomy and radiation therapy. She was treated only with Tamoxifen between June 2002 and June 2007. In August 2011 she had local recurrence, for which she underwent left skin-sparing mastectomy with sentinel node biopsy and reconstruction [Silicone Implant].  Final path showed DCIS only and ER/MI were positive. FISH was amplified. Postop she was offered tamoxifen again or a different hormonal therapy, but she chose not to pursue that. In 2014, she had an abnormal mammogram and a subsequent needle biopsy confirmed DCIS of the right breast, which was triple-negative, S/P lumpectomy on 10/7/14, Final path showed infiltrating ductal carcinoma, which was completely excised (tumor size 2 cm), ER/MI negative, HER-2/marleny also negative by FISH). thus Triple Negative. Ki-67 was 53 percent  unfavorable. She thus received 4 doses of adjuvant chemo with A/C regimen between 2014 and 2015. Subsequently she was initiated on Taxol weekly and received 12 doses between Feb and May 2015. RT to Right Breast 6100 Rads completed 2015. In continued Clinical Remission since then  On no therapy. Filgrastim Subcutaneous, 300 mcg  Cyclophosphamide IV,  mg, Dose modified  Doxorubicin IV,  mg, Dose modified  Doxorubicin + Cyclophosphamide (AC) + Paclitaxel (Weekly) (Paclitaxel only: Part 2 of 2) Cycle Length: 7 Number Cycles: 12 Start: Philippe Pong on 2015 Assoc Dx: Primary malignant neoplasm of female breast (disorder) LOT: Adjuvant   Paclitaxel IV,  mg, Dose modified     Assessment & Plan  1. Infiltrating ductal carcinoma of the right breast Triple Negative:  Right lumpectomy 2014, A/C, followed by Taxol completed in May 2105, followed by RT 6100 rads, completed in 2015. Continued clinical remission. No evidence of any recurrence at this time. Her last mammogram was 2017, BI-RADS category 2, benign. She will need yearly mammograms and continue to watch for recurrence. Since she is following with Dr. Latha Anthony, Dr. Stevenson Sen, and Dr. Awa Sawant on a regular basis, I suggested that she get her mammogram through them and we will see her on a p.r.n. basis or she can come back and see one of my partners after my [facility]. She chose to go with the former route, if needed. Past Medical History:   Past Medical History:   Diagnosis Date    Breast cancer ([address]     Cancer ([address]     Bilateral Breast    Chemotherapy-induced neuropathy ([address]     fingers and toes    Ductal carcinoma in situ (DCIS) of right breast     History of external beam radiation therapy 2015    [facility]    History of external beam radiation therapy 2002    [facility]    Lymphedema of right upper extremity     Pedal edema       Past Surgery History:   Past Surgical History:   Procedure Laterality Date    BREAST BIOPSY Left 2002    BREAST BIOPSY Right 2014    BREAST LUMPECTOMY Left 2002    BREAST LUMPECTOMY Right 2014    Right Lumpectomy with [place] Node Bx    BREAST SURGERY Left 2011    Mastectomy with [place] Bx and Reconstruction    IR BIOPSY LIVER PERCUTANEOUS  7/18/2022    IR BIOPSY LIVER PERCUTANEOUS 7/18/2022 [facility] SPECIAL PROCEDURES    TUNNELED VENOUS PORT PLACEMENT       Social History:   She denied smoking history. She drinks EtOH occasionally. She has one child. Family History: Many of her aunt, uncles, and cousins on the paternal side have various types of cancer, including breast cancer in one of her aunts around the age of 36, another aunt had ovarian cancer in her forties, and uncles had throat cancer, one of her cousins also possibly had breast cancer. Review of Systems: The remainder of ROS is unremarkable.      Vital Signs: BP (!) 142/68 (Site: Right Lower Arm, Position: Sitting, Cuff Size: Large Adult)   Pulse (!) 120   Temp 97.2 °F (36.2 °C) (Temporal)   Resp 18   Ht 5' 6\" (1.676 m)   SpO2 96%   BMI 33.73 kg/m²      Physical Exam:  CONSTITUTIONAL: awake, alert, cooperative, no apparent distress   EYES: pupils equal, round and reactive to light, sclera clear and conjunctiva normal  ENT: Normocephalic, without obvious abnormality, atraumatic  NECK: supple, symmetrical, no jugular venous distension and no carotid bruits   HEMATOLOGIC/LYMPHATIC: no cervical, supraclavicular or axillary lymphadenopathy   LUNGS: no increased work of breathing and clear to auscultation   BREAST:S/p left mastectomy and right lumpectomy. CARDIOVASCULAR: regular rate and rhythm, normal S1 and S2, no murmur noted  ABDOMEN: normal bowel sound, soft, non-distended, tenderness to RUQ abdomen. MUSCULOSKELETAL: full range of motion noted, tone is normal  NEUROLOGIC: She is on wheelchair. CN II - XII grossly intact. SKIN: Normal skin color, texture, turgor and no jaundice. appears intact   EXTREMITIES: no LE edema or cyanosis.        Labs:  Hematology:  Lab Results   Component Value Date    WBC 9.6 07/01/2022    RBC 5.03 07/01/2022    HGB 14.9 07/01/2022    HCT 43.4 07/01/2022    MCV 86.3 07/01/2022    MCH 29.6 07/01/2022    MCHC 34.3 07/01/2022    RDW 15.8 (H) 07/01/2022     07/01/2022    MPV 10.6 07/01/2022    SEGSPCT 65.1 07/01/2022    EOSRELPCT 0.4 07/01/2022    BASOPCT 0.4 07/01/2022    LYMPHOPCT 21.6 (L) 07/01/2022    MONOPCT 12.5 (H) 07/01/2022    SEGSABS 6.3 07/01/2022    EOSABS 0.0 07/01/2022    BASOSABS 0.0 07/01/2022    LYMPHSABS 2.1 07/01/2022    MONOSABS 1.2 07/01/2022    DIFFTYPE AUTOMATED DIFFERENTIAL 07/01/2022     Lab Results   Component Value Date    ESR 34 (H) 08/14/2017     Chemistry:  Lab Results   Component Value Date     (L) 07/01/2022    K 3.9 07/01/2022    CL 95 (L) 07/01/2022    CO2 26 07/01/2022    BUN 13 07/01/2022    CREATININE 1.3 (H) 07/19/2022    GLUCOSE 95 07/01/2022    CALCIUM 9.8 07/01/2022    PROT 8.0 07/01/2022    LABALBU 3.3 (L) 07/01/2022    BILITOT 1.4 (H) 07/01/2022    ALKPHOS 937 (H) 07/01/2022     (H) 07/01/2022     (H) 07/01/2022    LABGLOM 41 (L) 07/19/2022    GFRAA 50 (L) 07/19/2022     Lab Results   Component Value Date     10/03/2017     Lab Results   Component Value Date    TSHHS 2.631 08/26/2013     Immunology:  Lab Results   Component Value Date    PROT 8.0 07/01/2022    ALBUMINELP 3.7 10/03/2017    LABALPH 0.2 10/03/2017    LABALPH 0.6 10/03/2017    LABBETA 1.1 10/03/2017    GAMGLOB 1.4 10/03/2017     Tumor Markers:  Lab Results   Component Value Date     32.6 10/30/2014    CEA 1.3 06/02/2017    LABCA2 68.6 (H) 07/01/2022      Observations:  No data recorded     Assessment & Plan:  1. She has history of bilateral breast cancer as above. I reviewed CT findings. I referred for IR biopsy of liver lesion or lung lesion. Awaiting for path report. We will discuss option of treatment depending on biopsy result. 2. MRI of brain was reviewed. I referred to Dr Segundo Burt who knows her. She likely will be offered stereotactic radiotherapy. 3. She has elevated LFT's related metastatic disease. 4. I referred for genetic counseling. 5. Constipation. I prescribed laxative. RUQ pain: she will take OTC pain medication. RTC in 2 - 3 weeks or sooner. All of her questions have been answered for today. I have discussed the above stated plan with the patient and they verbalized understanding and agreed with the plan.

## 2022-07-12 ENCOUNTER — TELEPHONE (OUTPATIENT)
Dept: ONCOLOGY | Age: 72
End: 2022-07-12

## 2022-07-12 NOTE — TELEPHONE ENCOUNTER
Patients brother came into cancer center, I spoke with him at check in. I called while he was here and scheduled her MRI for 07/19 and moved ov to 07/20. He voiced understanding. Left number [phone] for any questions or concerns.

## 2022-07-13 NOTE — PROGRESS NOTES
.IR Procedure at [facility]:  Spoke with patient and she will arrive at 0730 at [facility] on 7/18/2022 for her procedure at 0930. Also went over below instructions, patient will check with ordering physician concerning medications. 1. NPO at Midnight     (Paracentesis, Thoracentesis, Thyroid Bx and Injections may have a light breakfast)  2. Follow your directions as prescribed by the doctor for your procedure and medications. 3.   Consult your provider as to when to stop blood thinner  4. Do not take any pain medication within 6 hours of your procedure  5. Do not drink any alcoholic beverages or use any street drugs 24 hours before procedure. 6.   Please wear simple, loose fitting clothing to the hospital.  Do not bring valuables (money,             credit cards, checkbooks, etc.)     7. If you  have a Living Will and Durable Power of  for Healthcare, please bring in a copy. 8.   Please bring picture ID,  insurance card, paperwork from the doctors office            (H & P, Consent,  & card for implantable devices). 9.   Report to the information desk on the ground floor. 10. Take a shower the night before or morning of your procedure, do not apply any lotion, oil or powder. 11. If you are going to be sedated for the procedure, you will need a responsible adult to drive you home.

## 2022-07-18 ENCOUNTER — HOSPITAL ENCOUNTER (OUTPATIENT)
Dept: INTERVENTIONAL RADIOLOGY/VASCULAR | Age: 72
Discharge: HOME | End: 2022-07-18
Payer: MEDICARE

## 2022-07-18 VITALS
RESPIRATION RATE: 16 BRPM | TEMPERATURE: 96.8 F | HEIGHT: 66 IN | OXYGEN SATURATION: 94 % | DIASTOLIC BLOOD PRESSURE: 59 MMHG | HEART RATE: 100 BPM | WEIGHT: 209 LBS | BODY MASS INDEX: 33.59 KG/M2 | SYSTOLIC BLOOD PRESSURE: 112 MMHG

## 2022-07-18 DIAGNOSIS — C50.012 MALIGNANT NEOPLASM OF NIPPLE OF LEFT BREAST IN FEMALE, UNSPECIFIED ESTROGEN RECEPTOR STATUS (HCC): ICD-10-CM

## 2022-07-18 LAB
APTT: 32 SECONDS (ref 25.1–37.1)
INR BLD: 1.35 INDEX
PROTHROMBIN TIME: 17.5 SECONDS (ref 11.7–14.5)

## 2022-07-18 PROCEDURE — 6360000002 HC RX W HCPCS: Performed by: RADIOLOGY

## 2022-07-18 PROCEDURE — 76942 ECHO GUIDE FOR BIOPSY: CPT

## 2022-07-18 PROCEDURE — 88342 IMHCHEM/IMCYTCHM 1ST ANTB: CPT | Performed by: PATHOLOGY

## 2022-07-18 PROCEDURE — 88341 IMHCHEM/IMCYTCHM EA ADD ANTB: CPT | Performed by: PATHOLOGY

## 2022-07-18 PROCEDURE — 7100000011 HC PHASE II RECOVERY - ADDTL 15 MIN

## 2022-07-18 PROCEDURE — 88334 PATH CONSLTJ SURG CYTO XM EA: CPT | Performed by: PATHOLOGY

## 2022-07-18 PROCEDURE — 47000 NEEDLE BIOPSY OF LIVER PERQ: CPT

## 2022-07-18 PROCEDURE — 7100000010 HC PHASE II RECOVERY - FIRST 15 MIN

## 2022-07-18 PROCEDURE — 88360 TUMOR IMMUNOHISTOCHEM/MANUAL: CPT | Performed by: PATHOLOGY

## 2022-07-18 PROCEDURE — 6370000000 HC RX 637 (ALT 250 FOR IP): Performed by: RADIOLOGY

## 2022-07-18 PROCEDURE — 88307 TISSUE EXAM BY PATHOLOGIST: CPT | Performed by: PATHOLOGY

## 2022-07-18 PROCEDURE — 85730 THROMBOPLASTIN TIME PARTIAL: CPT

## 2022-07-18 PROCEDURE — 85610 PROTHROMBIN TIME: CPT

## 2022-07-18 PROCEDURE — 88333 PATH CONSLTJ SURG CYTO XM 1: CPT | Performed by: PATHOLOGY

## 2022-07-18 RX ORDER — MIDAZOLAM HYDROCHLORIDE 2 MG/2ML
1 INJECTION, SOLUTION INTRAMUSCULAR; INTRAVENOUS ONCE
Status: COMPLETED | OUTPATIENT
Start: 2022-07-18 | End: 2022-07-18

## 2022-07-18 RX ORDER — FENTANYL CITRATE 50 UG/ML
50 INJECTION, SOLUTION INTRAMUSCULAR; INTRAVENOUS ONCE
Status: COMPLETED | OUTPATIENT
Start: 2022-07-18 | End: 2022-07-18

## 2022-07-18 RX ORDER — ACETAMINOPHEN 325 MG/1
650 TABLET ORAL ONCE
Status: COMPLETED | OUTPATIENT
Start: 2022-07-18 | End: 2022-07-18

## 2022-07-18 RX ORDER — SODIUM CHLORIDE 0.9 % (FLUSH) 0.9 %
10 SYRINGE (ML) INJECTION PRN
Status: DISCONTINUED | OUTPATIENT
Start: 2022-07-18 | End: 2022-07-19 | Stop reason: HOSPADM

## 2022-07-18 RX ADMIN — MIDAZOLAM HYDROCHLORIDE 1 MG: 1 INJECTION, SOLUTION INTRAMUSCULAR; INTRAVENOUS at 11:41

## 2022-07-18 RX ADMIN — ACETAMINOPHEN 650 MG: 325 TABLET ORAL at 12:55

## 2022-07-18 RX ADMIN — FENTANYL CITRATE 50 MCG: 50 INJECTION, SOLUTION INTRAMUSCULAR; INTRAVENOUS at 11:40

## 2022-07-18 ASSESSMENT — PAIN DESCRIPTION - LOCATION
LOCATION: ABDOMEN

## 2022-07-18 ASSESSMENT — PAIN DESCRIPTION - DESCRIPTORS
DESCRIPTORS: BURNING

## 2022-07-18 ASSESSMENT — PAIN - FUNCTIONAL ASSESSMENT: PAIN_FUNCTIONAL_ASSESSMENT: 0-10

## 2022-07-18 ASSESSMENT — PAIN SCALES - GENERAL
PAINLEVEL_OUTOF10: 3
PAINLEVEL_OUTOF10: 2
PAINLEVEL_OUTOF10: 2
PAINLEVEL_OUTOF10: 1
PAINLEVEL_OUTOF10: 3

## 2022-07-18 ASSESSMENT — PAIN DESCRIPTION - FREQUENCY
FREQUENCY: CONTINUOUS

## 2022-07-18 ASSESSMENT — PAIN DESCRIPTION - ORIENTATION
ORIENTATION: RIGHT

## 2022-07-18 NOTE — PROGRESS NOTES
1147 Pt. Brought back to unit, bedside report received from [facility], [address] [facility]. Pt. Is A&O, VSS, bx site remains C/D/I. Education provided on use of call light, call light in reach. 1150 Pt. Provided with crackers and beverage of choice. Pt. Remains on bedrest at this time. Visitor at bedside. 1255 Pt. Medicated per MD order for pain to R side ABD. DC instructions reviewed with pt and brother all parties express understanding. [address] [facility] Brianna Rodriguez instructions reviewed with pt and brother. All questions reviewed and answered. 46 Pt to DC home in private vehicle.

## 2022-07-18 NOTE — DISCHARGE INSTRUCTIONS
[place] [facility] CAN BE REACHED @[phone] QUESTIONS AND PROBLEMS    PATIENT DISCHARGE INSTRUCTIONS FOR BIOPSY    You have had a biopsy performed in the Radiology Department. Below are guidelines for you to follow after the biopsy has been completed:    Rest quietly for the remainder of the day. DO NOT drive, operate appliances or make any legal decisions today. You may resume normal activities tomorrow. Have a responsible adult drive you home and remain with you for the remainder of the day. Resume your normal diet after the procedure. Avoid alcoholic beverages and depressant drugs for 24 hours. You may resume your previous medications unless directed otherwise by your physician or the radiologist.  Krystle Trans may take Tylenol one to two tablets every 4 - 6 hours for mild discomfort at the biopsy site. If you develop severe pain, swelling or redness at the biopsy site, call the Radiology Department. With a liver, kidney or bone biopsy, call 911 or go to the nearest Emergency Department immediately if you develop a pulse greater than 100 beats per minute, feel lindsay or pass out, sweat profusely or develop a sudden onset of weakness, increased pain, swelling at the biopsy site,or a large amount of bleeding on the dressing at the biopsy site. These areas have a plentiful blood supply and can bleed. With lung biopsies, call 911 or go to the nearest Emergency Department if you develop a sudden onset of rapid breathing (greater than 20 breaths per minute), upper back or chest pain, shortness of breath, sweating, skin color change sudden onset of anxiety or large amount of bleeding on the dressing at the biopsy site. Report a fever of greater than 101' to the Radiology Department or to your physician. Check the dressing throughout the day for an increase in drainage. Keep the dressing dry for 24 hours. Replace with a bandaid if necessary.   You may shower 24 hours after the procedure. Do not smoke for 24 hours after the procedure. Call your physician for a follow-up appointment. The results will be sent directly to your physician within 5 - 7 working days.     If you have questions or problems after you arrive at home, please call the Interventional Radiology Department at [phone]    Watch for signs of infection    Elevated temperature, redness or pain at site,   Drainage at incision,

## 2022-07-18 NOTE — PROGRESS NOTES
1205 report recd from [facility], pt denies needs at this time, family at bedside, vss.

## 2022-07-19 ENCOUNTER — HOSPITAL ENCOUNTER (OUTPATIENT)
Dept: MRI IMAGING | Age: 72
Discharge: HOME | End: 2022-07-19
Payer: MEDICARE

## 2022-07-19 DIAGNOSIS — C50.912 MALIGNANT NEOPLASM OF LEFT BREAST IN FEMALE, ESTROGEN RECEPTOR NEGATIVE, UNSPECIFIED SITE OF BREAST (HCC): ICD-10-CM

## 2022-07-19 DIAGNOSIS — Z17.1 MALIGNANT NEOPLASM OF LEFT BREAST IN FEMALE, ESTROGEN RECEPTOR NEGATIVE, UNSPECIFIED SITE OF BREAST (HCC): ICD-10-CM

## 2022-07-19 LAB
GFR AFRICAN AMERICAN: 50 ML/MIN/1.73M2
GFR NON-AFRICAN AMERICAN: 41 ML/MIN/1.73M2
POC CREATININE: 1.3 MG/DL (ref 0.6–1.1)

## 2022-07-19 PROCEDURE — 70553 MRI BRAIN STEM W/O & W/DYE: CPT

## 2022-07-19 PROCEDURE — A9577 INJ MULTIHANCE: HCPCS | Performed by: INTERNAL MEDICINE

## 2022-07-19 PROCEDURE — 6360000004 HC RX CONTRAST MEDICATION: Performed by: INTERNAL MEDICINE

## 2022-07-19 RX ADMIN — GADOBENATE DIMEGLUMINE 10 ML: 529 INJECTION, SOLUTION INTRAVENOUS at 09:15

## 2022-07-19 NOTE — H&P
Date:7/19/2022  Name:Burton Wilkinson   DLW:08/05/1809   #:[id number]    SEX:female   Referring Physician:  Kelly Loo  Primary Physician:  Magaly Prieto  Chief Complaint:  liver masses  History of Present Illness:   liver masses    HISTORY AND PHYSICAL  Malignant neoplasm of nipple of left breast in female, unspecified estrogen receptor status ([facility] [C50.012]    Past Medical History:  Past Medical History:   Diagnosis Date    Breast cancer ([facility] [address]     Cancer ([facility] [address]     Bilateral Breast    Chemotherapy-induced neuropathy ([facility] [address]     fingers and toes    Ductal carcinoma in situ (DCIS) of right breast     History of external beam radiation therapy 2015    [facility]    History of external beam radiation therapy 2002    [facility] Breast/  [facility]    Lymphedema of right upper extremity     Pedal edema        Past Surgical History:  Past Surgical History:   Procedure Laterality Date    BREAST BIOPSY Left 2002    BREAST BIOPSY Right 2014    BREAST LUMPECTOMY Left 2002    BREAST LUMPECTOMY Right 2014    Right Lumpectomy with [place] Node Bx    BREAST SURGERY Left 2011    Mastectomy with [place] Bx and Reconstruction    IR BIOPSY LIVER PERCUTANEOUS  7/18/2022    IR BIOPSY LIVER PERCUTANEOUS 7/18/2022 [facility] SPECIAL PROCEDURES    TUNNELED VENOUS PORT PLACEMENT         Social History:  Social History     Socioeconomic History    Marital status:       Spouse name: Not on file    Number of children: Not on file    Years of education: Not on file    Highest education level: Not on file   Occupational History    Not on file   Tobacco Use    Smoking status: Never    Smokeless tobacco: Never   Vaping Use    Vaping Use: Never used   Substance and Sexual Activity    Alcohol use: No    Drug use: No    Sexual activity: Yes     Partners: Male   Other Topics Concern    Not on file   Social History Narrative    Not on file     Social Determinants of Health     Financial Resource Strain: Low Risk     Difficulty of Paying Living Expenses: Not hard at all   Food Insecurity: No Food Insecurity    Worried About Running Out of Food in the Last Year: Never true    Ran Out of Food in the Last Year: Never true   Transportation Needs: Not on file   Physical Activity: Not on file   Stress: Not on file   Social Connections: Not on file   Intimate Partner Violence: Not on file   Housing Stability: Not on file       Family History:  Family History   Problem Relation Age of Onset    Heart Failure Mother     High Blood Pressure Mother     Kidney Disease Father     Other Brother         homicide    Other Brother         sepsis    Breast Cancer Neg Hx        Allergies: Allergies   Allergen Reactions    Penicillins     Tape [place] Tape] Rash       Medications:  Current Outpatient Medications on File Prior to Encounter   Medication Sig Dispense Refill    ibuprofen (ADVIL;MOTRIN) 200 MG tablet Take 200 mg by mouth every 6 hours as needed for Pain      spironolactone-hydrochlorothiazide (ALDACTAZIDE) 50-50 MG per tablet Take 1 tablet by mouth daily 90 tablet 3    APPLE CIDER VINEGAR PO Take 1 tablet by mouth daily  (Patient not taking: No sig reported)      Pyridoxine HCl (VITAMIN B-6) 100 MG tablet Take 100 mg by mouth daily      vitamin B-12 (CYANOCOBALAMIN) 1000 MCG tablet Take 1,000 mcg by mouth daily      vitamin B-1 (THIAMINE) 100 MG tablet Take 100 mg by mouth daily      MULTIPLE VITAMIN PO Take  by mouth. No current facility-administered medications on file prior to encounter. ROS: No fevers or chills    Vital Signs: Body mass index is 33.73 kg/m².     Laboratory:  Recent Labs     07/18/22  0840 07/19/22  0855   CREATININE  --  1.3*   INR 1.35  --      INR @LABR24(INR)@    Physical Exam:  GENERAL:Well developed, well nourished in NAD  NECK: Neck exam - No JVD,HJR or carotid bruit, no thyromegaly   RESPIRATORY:Clear to auscultation  HEART:RRR,no murmer, gallop or friction rub  ABDOMEN: Bowel sounds present, no tenderness to palpation. No organomegaly  EXTREMITIES: no edema or cyanosis  VASCULAR:  no ischemic changes  SKIN: no erythema, rubor or lesions noted  NEURO/PSYCH:Alert and oriented    EKG:    Imaging:    Chest: CTA    Heart: S1, S1    Impression:  Active Problems:    * No active hospital problems. *  Resolved Problems:    * No resolved hospital problems.  *      liver masses    Mallampati Score 2  ASA class 2    PLAN OF CARE/PLANNED PROCEDURE    IR BIOPSY LIVER PERCUTANEOUS [01245]

## 2022-07-19 NOTE — PRE SEDATION
Sedation Pre-Procedure Note    Patient Name: Rocael Calloway   YOB: 1950  Room/Bed: Room/bed info not found  Medical Record Number: [id number]  Date: 7/19/2022   Time: 3:00 PM       Indication:  LIver mass/liver biopsy    Consent: I have discussed with the patient and/or the patient representative the indication, alternatives, and the possible risks and/or complications of the planned procedure and the anesthesia methods. The patient and/or patient representative appear to understand and agree to proceed. Vital Signs:   Vitals:    07/18/22 1332   BP: (!) 112/59   Pulse: 100   Resp: 16   Temp: 96.8 °F (36 °C)   SpO2:        Past Medical History:   has a past medical history of Breast cancer ([facility] [address] Cancer ([facility] [address] Chemotherapy-induced neuropathy ([facility] [address] Ductal carcinoma in situ (DCIS) of right breast, History of external beam radiation therapy, History of external beam radiation therapy, Lymphedema of right upper extremity, and Pedal edema. Past Surgical History:   has a past surgical history that includes Tunneled venous port placement; Breast surgery (Left, 2011); Breast biopsy (Left, 2002); Breast lumpectomy (Left, 2002); Breast biopsy (Right, 2014); Breast lumpectomy (Right, 2014); and IR BIOPSY LIVER PERCUTANEOUS (7/18/2022). Medications:   Scheduled Meds:   Continuous Infusions:   PRN Meds:   Home Meds:   Prior to Admission medications    Medication Sig Start Date End Date Taking?  Authorizing Provider   ibuprofen (ADVIL;MOTRIN) 200 MG tablet Take 200 mg by mouth every 6 hours as needed for Pain    Historical Provider, MD   spironolactone-hydrochlorothiazide (ALDACTAZIDE) 50-50 MG per tablet Take 1 tablet by mouth daily 8/2/21   Anne Marie Soria DO   APPLE CIDER VINEGAR PO Take 1 tablet by mouth daily   Patient not taking: No sig reported    Historical Provider, MD   Pyridoxine HCl (VITAMIN B-6) 100 MG tablet Take 100 mg by mouth daily    Historical Provider, MD   vitamin B-12 (CYANOCOBALAMIN) 1000 MCG tablet Take 1,000 mcg by mouth daily    Historical Provider, MD   vitamin B-1 (THIAMINE) 100 MG tablet Take 100 mg by mouth daily    Historical Provider, MD   MULTIPLE VITAMIN PO Take  by mouth. Historical Provider, MD     Coumadin Use Last 7 Days:  no  Antiplatelet drug therapy use last 7 days: no  Other anticoagulant use last 7 days: no  Additional Medication Information:  none      Pre-Sedation Documentation and Exam:   I have personally completed a history, physical exam & review of systems for this patient (see notes).     Mallampati Airway Assessment:  normal neck range of motion    Prior History of Anesthesia Complications:   none    ASA Classification:  Class 3 - A patient with severe systemic disease that limits activity but is not incapacitating    Sedation/ Anesthesia Plan:   intravenous sedation    Medications Planned:   midazolam (Versed) intravenously and fentanyl intravenously    Patient is an appropriate candidate for plan of sedation: yes    Electronically signed by Emiliana Merlos MD on 7/19/2022 at 3:00 PM

## 2022-07-20 ENCOUNTER — OFFICE VISIT (OUTPATIENT)
Dept: ONCOLOGY | Age: 72
End: 2022-07-20
Payer: MEDICARE

## 2022-07-20 ENCOUNTER — CLINICAL DOCUMENTATION (OUTPATIENT)
Dept: ONCOLOGY | Age: 72
End: 2022-07-20

## 2022-07-20 ENCOUNTER — HOSPITAL ENCOUNTER (OUTPATIENT)
Dept: INFUSION THERAPY | Age: 72
End: 2022-07-20

## 2022-07-20 VITALS
DIASTOLIC BLOOD PRESSURE: 68 MMHG | SYSTOLIC BLOOD PRESSURE: 142 MMHG | HEIGHT: 66 IN | OXYGEN SATURATION: 96 % | HEART RATE: 120 BPM | RESPIRATION RATE: 18 BRPM | BODY MASS INDEX: 33.73 KG/M2 | TEMPERATURE: 97.2 F

## 2022-07-20 DIAGNOSIS — G93.9 BRAIN LESION: Primary | ICD-10-CM

## 2022-07-20 PROCEDURE — 1123F ACP DISCUSS/DSCN MKR DOCD: CPT | Performed by: INTERNAL MEDICINE

## 2022-07-20 PROCEDURE — 99214 OFFICE O/P EST MOD 30 MIN: CPT | Performed by: INTERNAL MEDICINE

## 2022-07-20 RX ORDER — DEXAMETHASONE 4 MG/1
4 TABLET ORAL 2 TIMES DAILY
Qty: 20 TABLET | Refills: 0 | Status: SHIPPED | OUTPATIENT
Start: 2022-07-20 | End: 2022-08-02 | Stop reason: SDUPTHER

## 2022-07-20 RX ORDER — SENNA AND DOCUSATE SODIUM 50; 8.6 MG/1; MG/1
2 TABLET, FILM COATED ORAL DAILY
Qty: 60 TABLET | Refills: 1 | Status: SHIPPED | OUTPATIENT
Start: 2022-07-20 | End: 2022-08-19

## 2022-07-20 RX ORDER — POLYETHYLENE GLYCOL 3350 17 G/17G
17 POWDER, FOR SOLUTION ORAL DAILY PRN
Qty: 1530 G | Refills: 1 | Status: SHIPPED | OUTPATIENT
Start: 2022-07-20 | End: 2022-08-19

## 2022-07-20 NOTE — PROGRESS NOTES
MA Rooming Questions  Patient: Jc Farmer  MRN: [id number]    Date: 7/20/2022        1. Do you have any new issues? yes - tired, weak, dizzy, constipation         2. Do you need any refills on medications?    no    3. Have you had any imaging done since your last visit? yes - MRI    4. Have you been hospitalized or seen in the emergency room since your last visit here?   yes - had liver BX done. - path still pend. 5. Did the patient have a depression screening completed today?  No    No data recorded     PHQ-9 Given to (if applicable):               PHQ-9 Score (if applicable):                     [] Positive     []  Negative              Does question #9 need addressed (if applicable)                     [] Yes    []  No               Lxeii Mckenzie CMA

## 2022-07-20 NOTE — PROGRESS NOTES
PA submitted thru cover my meds for dexamethasone 4 mg, went to review. However patient can also get thru good RX.

## 2022-07-22 ENCOUNTER — CLINICAL DOCUMENTATION (OUTPATIENT)
Dept: ONCOLOGY | Age: 72
End: 2022-07-22

## 2022-07-22 NOTE — PROGRESS NOTES
Per Dr. Elias Cruz, order and required medical records faxed to [facility] @ [phone] for further molecular testing to benefit and determine treatment planning for patient. Liver pathology: Specimen #[id number]. Order also sent over to [facility] for additional testing on same path for MSI and PD-L1.

## 2022-07-25 ENCOUNTER — HOSPITAL ENCOUNTER (OUTPATIENT)
Dept: RADIATION ONCOLOGY | Age: 72
Discharge: HOME | End: 2022-07-25
Payer: MEDICARE

## 2022-07-25 ENCOUNTER — OFFICE VISIT (OUTPATIENT)
Dept: ONCOLOGY | Age: 72
End: 2022-07-25
Payer: MEDICARE

## 2022-07-25 VITALS
OXYGEN SATURATION: 96 % | WEIGHT: 209 LBS | BODY MASS INDEX: 33.59 KG/M2 | SYSTOLIC BLOOD PRESSURE: 132 MMHG | RESPIRATION RATE: 18 BRPM | DIASTOLIC BLOOD PRESSURE: 87 MMHG | HEART RATE: 101 BPM | HEIGHT: 66 IN | TEMPERATURE: 97.5 F

## 2022-07-25 DIAGNOSIS — Z17.1 MALIGNANT NEOPLASM OF OVERLAPPING SITES OF BREAST IN FEMALE, ESTROGEN RECEPTOR NEGATIVE, UNSPECIFIED LATERALITY (HCC): ICD-10-CM

## 2022-07-25 DIAGNOSIS — C79.31 BRAIN METASTASIS (HCC): Primary | ICD-10-CM

## 2022-07-25 DIAGNOSIS — C50.819 MALIGNANT NEOPLASM OF OVERLAPPING SITES OF BREAST IN FEMALE, ESTROGEN RECEPTOR NEGATIVE, UNSPECIFIED LATERALITY (HCC): ICD-10-CM

## 2022-07-25 DIAGNOSIS — D05.11 DUCTAL CARCINOMA IN SITU (DCIS) OF RIGHT BREAST: ICD-10-CM

## 2022-07-25 PROCEDURE — 1123F ACP DISCUSS/DSCN MKR DOCD: CPT | Performed by: INTERNAL MEDICINE

## 2022-07-25 PROCEDURE — 99215 OFFICE O/P EST HI 40 MIN: CPT | Performed by: INTERNAL MEDICINE

## 2022-07-25 PROCEDURE — 99205 OFFICE O/P NEW HI 60 MIN: CPT | Performed by: RADIOLOGY

## 2022-07-25 RX ORDER — LEVETIRACETAM 500 MG/1
500 TABLET ORAL 2 TIMES DAILY
Qty: 15 TABLET | Refills: 0 | Status: ON HOLD | OUTPATIENT
Start: 2022-07-29 | End: 2022-08-08 | Stop reason: SDUPTHER

## 2022-07-25 RX ORDER — PANTOPRAZOLE SODIUM 40 MG/1
40 TABLET, DELAYED RELEASE ORAL DAILY
Qty: 30 TABLET | Refills: 0 | Status: SHIPPED | OUTPATIENT
Start: 2022-07-25 | End: 2022-08-24

## 2022-07-25 ASSESSMENT — PAIN SCALES - GENERAL: PAINLEVEL_OUTOF10: 0

## 2022-07-25 NOTE — PROGRESS NOTES
MA Rooming Questions  Patient: Jose Leos  MRN: [id number]    Date: 7/25/2022        1. Do you have any new issues?   no         2. Do you need any refills on medications?    no    3. Have you had any imaging done since your last visit?   no    4. Have you been hospitalized or seen in the emergency room since your last visit here?   no    5. Did the patient have a depression screening completed today?  No    No data recorded     PHQ-9 Given to (if applicable):               PHQ-9 Score (if applicable):                     [] Positive     []  Negative              Does question #9 need addressed (if applicable)                     [] Yes    []  No               Bismark Marino CMA [facility]  EMERGENCY DEPARTMENT ENCOUNTER        Pt Name: Mauro Lopez  MRN: [id number]  [place] 1939  Date of evaluation: 6/9/2021  Provider: CODEY Villegas  PCP: KALI Shepard CNP  Note Started: 2:22 AM EDT        I have seen and evaluated this patient with my supervising physician Dr. John Shahid. CHIEF COMPLAINT       Chief Complaint   Patient presents with    Shortness of Breath     couple days; leg and arm swelling;     Dizziness     lightheaded        HISTORY OF PRESENT ILLNESS   (Location, Timing/Onset, Context/Setting, Quality, Duration, Modifying Factors, Severity, Associated Signs and Symptoms)  Note limiting factors. Mauro Lopez is a 80 y.o. female who presents with a Chief Complaint of shortness of breath, swelling, dizziness. Patient notes over the last 2 weeks she has had increasing swelling of her bilateral lower legs, over the last day has noticed swelling of her hands as well. Associated shortness of breath, worse with lying flat and she has to sit upright in a chair in order to sleep. She also when she stands she feels lightheaded, denies vertigo. This lightheadedness has been occurring for approximately 1 week. She denies fever, chest pain, shortness breath, abdominal pain, nausea, vomiting, numbness or weakness. She does not know what her baseline heart rate is. She does not take metoprolol for hypertension. Nursing Notes were all reviewed and agreed with or any disagreements were addressed in the HPI. REVIEW OF SYSTEMS    (2-9 systems for level 4, 10 or more for level 5)     Review of Systems   Constitutional: Negative for fever. HENT: Negative for sore throat. Eyes: Negative for pain and visual disturbance. Respiratory: Positive for shortness of breath. Negative for cough. Cardiovascular: Positive for leg swelling. Negative for chest pain. Gastrointestinal: Negative for abdominal pain, nausea and vomiting. Genitourinary: Negative for dysuria and frequency. Musculoskeletal: Negative for back pain and neck pain. Skin: Negative for rash. Neurological: Positive for light-headedness. Negative for dizziness, weakness, numbness and headaches. Psychiatric/Behavioral: Negative for confusion. Positives and Pertinent negatives as per HPI. Except as noted above in the ROS, all other systems were reviewed and negative.        PAST MEDICAL HISTORY     Past Medical History:   Diagnosis Date    Arthritis     BCC (basal cell carcinoma of skin)     RT clavicle    Bladder infection     frequently    CAD (coronary artery disease)     stents    Cancer ([facility] [address]     skin    CHF (congestive heart failure) (HCC)     Chronic back pain     COPD (chronic obstructive pulmonary disease) ([facility] [address]     Depression     Depression     Hypercholesteremia     Hypertension     Pain in shoulder [id number]    pain in left shoulder, taking steroid injections for steroid    Reflux     Rheumatic fever     as a child    Sleep apnea     uses CPAP    TIA (transient ischemic attack)     Urinary incontinence     Wears glasses          SURGICAL HISTORY     Past Surgical History:   Procedure Laterality Date    AORTIC VALVE REPLACEMENT      APPENDECTOMY      BLADDER REPAIR      3 TIMES    CARDIAC SURGERY      stents    CARPAL TUNNEL RELEASE      RIGHT    CHOLECYSTECTOMY, LAPAROSCOPIC  01/06/2017    with dr Mick Schirmer      has it it done twice    DIAGNOSTIC CARDIAC CATH LAB PROCEDURE      HYSTERECTOMY      JOINT REPLACEMENT      KATHLEEN TKR    NECK SURGERY      OTHER SURGICAL HISTORY  9/28/12    Full Interstim Implant    SHOULDER SURGERY      SKIN CANCER EXCISION      SPINAL FIXATION SURGERY WITH IMPLANT  2001    SPINAL FIXATION SURGERY WITH IMPLANT  2004    SPINAL FIXATION SURGERY WITH IMPLANT  2007    SPINE SURGERY  1999    TONSILLECTOMY      UPPER GASTROINTESTINAL ENDOSCOPY  4/22/11    UPPER GASTROINTESTINAL ENDOSCOPY      UPPER GASTROINTESTINAL ENDOSCOPY  03/23/2017    dilitation         CURRENTMEDICATIONS       Current Discharge Medication List      CONTINUE these medications which have NOT CHANGED    Details   gabapentin (NEURONTIN) 100 MG capsule Take 100 mg by mouth 3 times daily. traZODone (DESYREL) 100 MG tablet TAKE 1 OR 2 TABLETS AT BEDTIME  Qty: 60 tablet, Refills: 3    Associated Diagnoses: Primary insomnia      busPIRone (BUSPAR) 5 MG tablet TAKE 1 OR 2 TABLET(S) IN THE EVENING AS NEEDED FOR ANXIETY  Qty: 60 tablet, Refills: 4    Associated Diagnoses: Anxiety      furosemide (LASIX) 20 MG tablet TAKE (1) TABLET DAILY  Qty: 90 tablet, Refills: 1      citalopram (CELEXA) 20 MG tablet Take 1.5 tablets by mouth daily  Qty: 45 tablet, Refills: 0      !! cetirizine (ZYRTEC) 10 MG tablet TAKE 1 TABLET BY MOUTH DAILY  Qty: 30 tablet, Refills: 3    Associated Diagnoses: Allergic rhinitis, unspecified seasonality, unspecified trigger      [address] [address]147 UNIT/GM powder APPLY 3 TIMES DAILY  Qty: 60 g, Refills: 1      cycloSPORINE (RESTASIS) 0.05 % ophthalmic emulsion Apply 1 drop to eye as needed      donepezil (ARICEPT) 10 MG tablet TAKE 1 TABLET IN THE EVENING  Qty: 30 tablet, Refills: 11      divalproex (DEPAKOTE) 250 MG DR tablet Take 250 mg by mouth 2 times daily       metoprolol tartrate (LOPRESSOR) 25 MG tablet Take 25 mg by mouth 2 times daily       pantoprazole (PROTONIX) 40 MG tablet Take 1 tablet by mouth daily  Qty: 90 tablet, Refills: 0      simvastatin (ZOCOR) 40 MG tablet TAKE ONE TABLET NIGHTLY. aspirin (ASPIRIN 81) 81 MG EC tablet Take 81 mg by mouth daily      candesartan (ATACAND) 8 MG tablet Take 8 mg by mouth daily      HYDROcodone-acetaminophen (NORCO)  MG per tablet Take 1 tablet by mouth every 6 hours as needed.        baclofen (LIORESAL) 10 MG tablet TAKE 1 TABLET EVERY 8 HOURS AS NEEDED  Qty: 30 tablet, Refills: 0    Associated Diagnoses: Acute right-sided thoracic back pain      isosorbide mononitrate (IMDUR) 30 MG extended release tablet Take 30 mg by mouth daily      albuterol-ipratropium (COMBIVENT RESPIMAT)  MCG/ACT AERS inhaler Inhale 1 puff into the lungs every 6 hours  Qty: 1 Inhaler, Refills: 5      KLOR-CON SPRINKLE 10 MEQ extended release capsule TAKE 2 CAPSULES IN THE   MORNING AND 2 CAPSULES IN  THE EVENING  Qty: 120 capsule, Refills: 3      amLODIPine (NORVASC) 10 MG tablet TAKE (1) TABLET DAILY  Qty: 30 tablet, Refills: 5    Associated Diagnoses: Essential hypertension      !! cetirizine (ZYRTEC) 10 MG tablet TAKE (1) TABLET DAILY  Qty: 30 tablet, Refills: 0    Comments: $  Associated Diagnoses: Allergic rhinitis, unspecified seasonality, unspecified trigger      diphenhydrAMINE (BANOPHEN) 25 MG tablet Take 0.5 tablets by mouth every 12 hours  Qty: 45 tablet, Refills: 0      butalbital-acetaminophen-caffeine (FIORICET, ESGIC) -40 MG per tablet TAKE 1 TABLET EVERY 6 HOURS AS NEEDED FOR HEADACHES LIMIT TO 3 DAYS PER WEEK  Qty: 30 tablet, Refills: 0    Comments: $      Misc. Devices (ROLLER WALKER) MISC 1 each by Does not apply route daily  Qty: 1 each, Refills: 0    Associated Diagnoses: At high risk for falls      NITROSTAT 0.4 MG SL tablet USE 1 TABLET UNDER TONGUE AS NEEDED FOR CHEST PAIN MAY REPEAT EVERY 5MIN. UP TO 3. THEN GO TO ER. Qty: 25 tablet, Refills: 3      lidocaine (XYLOCAINE) 5 % ointment Apply topically as needed. Qty: 1 Tube, Refills: 3    Associated Diagnoses: Chronic pain       !! - Potential duplicate medications found. Please discuss with provider.             ALLERGIES     Latex, Levofloxacin, Azithromycin, Codeine, Keflex [cephalexin], Morphine, Pentazocine lactate, Sulfa antibiotics, and Tape [adhesive tape]    FAMILYHISTORY       Family History   Problem Relation Age of Onset    Arthritis Mother     Cancer Mother     Depression Mother     Heart Disease Mother     High Blood Pressure Mother     High Cholesterol Mother     Miscarriages / Stillbirths Mother     Stroke Mother     Early Death Mother     Hearing Loss Mother     Mental Illness Mother     Vision Loss Mother     Arthritis Brother     Depression Brother     Diabetes Brother     Hearing Loss Brother     Heart Disease Brother     High Blood Pressure Brother     High Cholesterol Brother     Vision Loss Brother           SOCIAL HISTORY       Social History     Tobacco Use    Smoking status: Never Smoker    Smokeless tobacco: Never Used   Vaping Use    Vaping Use: Never used   Substance Use Topics    Alcohol use: No    Drug use: No       SCREENINGS    [place] Coma Scale  Eye Opening: Spontaneous  Best Verbal Response: Oriented  Best Motor Response: Obeys commands  Abbi Coma Scale Score: 15        PHYSICAL EXAM    (up to 7 for level 4, 8 or more for level 5)     ED Triage Vitals [06/09/21 1527]   BP Temp Temp Source Pulse Resp SpO2 Height Weight   (!) 157/72 97.8 °F (36.6 °C) Oral (!) 49 16 94 % 5' (1.524 m) 193 lb (87.5 kg)       Physical Exam  Vitals reviewed. Constitutional:       Appearance: She is not diaphoretic. HENT:      Nose: No congestion or rhinorrhea. Eyes:      General: No scleral icterus. Conjunctiva/sclera: Conjunctivae normal.   Cardiovascular:      Rate and Rhythm: Regular rhythm. Bradycardia present. Pulses: Normal pulses. Heart sounds: Normal heart sounds. No murmur heard. No friction rub. No gallop. Pulmonary:      Effort: Pulmonary effort is normal. No respiratory distress. Breath sounds: Normal breath sounds. No stridor. No wheezing, rhonchi or rales. Abdominal:      General: There is no distension. Palpations: Abdomen is soft. Tenderness: There is no abdominal tenderness. There is no guarding or rebound. Musculoskeletal:         General: Normal range of motion. Cervical back: Normal range of motion and neck supple.       Right lower leg: Edema present. Left lower leg: Edema present. Skin:     General: Skin is warm and dry. Capillary Refill: Capillary refill takes less than 2 seconds. Neurological:      General: No focal deficit present. Mental Status: She is alert and oriented to person, place, and time. Sensory: No sensory deficit. Motor: No weakness. Comments: Patient notes lightheadedness with standing. NIHSS 0. Normal test of skew. No ataxia.    Psychiatric:         Mood and Affect: Mood normal.         Behavior: Behavior normal.         DIAGNOSTIC RESULTS   LABS:    Labs Reviewed   CBC WITH AUTO DIFFERENTIAL - Abnormal; Notable for the following components:       Result Value    Hemoglobin 11.6 (*)     Hematocrit 35.8 (*)     All other components within normal limits    Narrative:     Performed at:  [facility],  [place]   Phone [phone]   COMPREHENSIVE METABOLIC PANEL W/ REFLEX TO MG FOR LOW K - Abnormal; Notable for the following components:    Potassium reflex Magnesium 5.2 (*)     CREATININE 1.5 (*)     GFR Non- 33 (*)     GFR  40 (*)     ALT 7 (*)     AST 11 (*)     All other components within normal limits    Narrative:     Performed at:  [facility]) - [facility],  [place]   Phone [phone]   TSH WITH REFLEX - Abnormal; Notable for the following components:    TSH 0.25 (*)     All other components within normal limits    Narrative:     Performed at:  [facility],  [place]   Phone [phone], [place]    Narrative:     Performed at:  [facility],  [place]   Phone [phone]   TROPONIN    Narrative:     Performed at:  [facility]) - [facility],  [place]   Phone [phone] [place]    Narrative:     Performed at:  [facility] - [facility]  [address],  [place], [place]   Phone [phone]   URINE RT REFLEX TO CULTURE   T4, FREE       All other labs were within normal range or not returned as of this dictation. EKG: All EKG's are interpreted by the Emergency Department Physician in the absence of a cardiologist.  Please see their note for interpretation of EKG. RADIOLOGY:   Non-plain film images such as CT, Ultrasound and MRI are read by the radiologist. Plain radiographic images are visualized and preliminarily interpreted by the ED Provider with the below findings:        Interpretation per the Radiologist below, if available at the time of this note:    XR CHEST PORTABLE   Final Result   Mild pulmonary vascular congestion. No overt edema or acute process. XR CHEST PORTABLE    Result Date: 6/9/2021  EXAMINATION: ONE XRAY VIEW OF THE CHEST 6/9/2021 4:02 pm COMPARISON: Chest radiograph November 23, 2020. HISTORY: ORDERING SYSTEM PROVIDED HISTORY: SOB TECHNOLOGIST PROVIDED HISTORY: Reason for exam:->SOB Reason for Exam: short of breath FINDINGS: Mild pulmonary vascular congestion. No overt edema. The lungs are without acute focal process. There is no effusion or pneumothorax. Postsurgical changes are seen in the region of the aortic valve. The cardiomediastinal silhouette is without acute process. The osseous structures are without acute process. Postsurgical changes are again seen in the lower cervical spine. No significant change compared to prior. Mild pulmonary vascular congestion. No overt edema or acute process.            PROCEDURES   Unless otherwise noted below, none     Procedures    CRITICAL CARE TIME   N/A    CONSULTS:  IP CONSULT TO CARDIOLOGY  IP CONSULT TO HOSPITALIST  IP CONSULT TO CARDIOLOGY      EMERGENCY DEPARTMENT COURSE and DIFFERENTIAL DIAGNOSIS/MDM:   Vitals:    Vitals: 06/09/21 1853 06/09/21 1901 06/09/21 1957 06/10/21 0210   BP:  (!) 148/60 (!) 171/66 (!) 186/74   Pulse:  52 50 68   Resp:  14 16 18   Temp:   97.3 °F (36.3 °C) 98.2 °F (36.8 °C)   TempSrc:   Oral Oral   SpO2: 96%  93% 95%   Weight:   196 lb 11.2 oz (89.2 kg)    Height:   5' (1.524 m)        Patient was given the following medications:  Medications   furosemide (LASIX) injection 60 mg (60 mg Intravenous Given 6/9/21 1719)          80-year-old female presents emergency department with multiple complaints including shortness of breath, swelling, lightheadedness. The lightheadedness has been occurring with standing for 1 week, stroke score 0 with normal test of skew and no ataxia. She is well outside of the stroke window and I have a low concern for CVA based on her examination. Her edema appears most likely secondary to CHF her BNP is relatively normal her chest x-ray does show evidence of vascular congestion. IV Lasix ordered. She does have worsening CKD without true MARIO at this time. She is known to be bradycardic, my suspicion is that her metoprolol is the cause of her bradycardia which also likely is causing her orthostatic lightheadedness. Discussed with cardiologist Dr. Lisa Arriaza due to her symptomatic bradycardia, he stated that she is stable to be admitted to [facility], and requested that TSH and free T4 be added to her labs. Attending physician Dr. Gary Elena spoke with hospitalist about admission, see her note for details. Patient has been accepted for admission. FINAL IMPRESSION      1. Symptomatic bradycardia    2. Acute on chronic congestive heart failure, unspecified heart failure type ([facility] [address]    3. Chronic kidney disease, unspecified CKD stage          DISPOSITION/PLAN   DISPOSITION Admitted 06/09/2021 05:39:11 PM      PATIENT REFERRED TO:  No follow-up provider specified.     DISCHARGE MEDICATIONS:  Current Discharge Medication List          DISCONTINUED MEDICATIONS:  Current Discharge Medication List (Please note that portions of this note were completed with a voice recognition program.  Efforts were made to edit the dictations but occasionally words are mis-transcribed.)    CODEY Vasquez (electronically signed)         CODEY Vasquez  06/10/21 8877

## 2022-07-25 NOTE — PROGRESS NOTES
Patient Name:  Kristin Wilkinson  Patient :  1950  Patient MRN:  [id number]     Primary Oncologist: Dorina Irizarry MD  Referring Provider: Zi Johnson DO     Date of Service: 2022      Chief Complaint:    Chief Complaint   Patient presents with    Follow-up     She came in for follow up visit. Patient Active Problem List:     DCIS (ductal carcinoma in situ) of breast     Ductal carcinoma in situ (DCIS) of right breast     Chemotherapy-induced neuropathy (HCC)     Pedal edema     Lymphedema of right upper extremity     Chronic renal disease, stage III [facility]) [[id number]]     HPI:   Burton Wilkinson is a pleasant 70 y.o. female with past medical history of breast cancer status post chemotherapy and radiation as well as mastectomy and lumpectomy with last therapy in  presented to ER at [facility] on 2022  with right upper quadrant abdominal pain for last 2 weeks. US of abdomen 2022:  Hepatomegaly. Small liver lesions in the left lobe, indeterminate. Multiphasic CT of the liver is recommended to evaluate completely. No acute abnormality otherwise   CT CAP 2022:  7 cm diameter mass crossing the right major fissure, involving the right middle lobe and right lower lobe, with additional innumerable lung nodules measuring up to about 1.5 cm, compatible with metastatic disease; unclear whether the 7 cm mass is the primary, or diffuse metastatic disease is secondary to a separate primary such as known breast cancer. Innumerable liver lesions compatible with metastatic disease   T3 8mm vertebral body lucent lesion indeterminate. 6 mm medial right breast nodule. CBC in 2021 was grossly unremarkable. LFT wnl. Creatinine was 1.2. Right breast mammogram 2021 was benign. On 2022 albumin was 3.4L, Alkaline Phosphatase 688H,   AST 196H, ALT  108H, Total Bilirubin 1.7, Direct Bilirubin 0.80H   WBC was 12.7, Hg 15.8, platelet 247. ANC 9.1, absolute monocyte 1.4.     CT head discomfort beteter. Denied any nausea, vomiting or diarrhea. No fever or chills. No chest pain, shortness of breath or palpitation. No headache. No specific bone pain. No melena or hematochezia. Denied any dysuria or hematuria. Oncology History:   She  initially was diagnosed with Stage I left breast infiltrating ductal carcinoma in 2002, ER/DC and HER-2/marleny positive by immunohistochemistry 3+. S/P lumpectomy and radiation therapy. She was treated only with Tamoxifen between 2002 and 2007. In 2011 she had local recurrence, for which she underwent left skin-sparing mastectomy with sentinel node biopsy and reconstruction [Silicone Implant]. Final path showed DCIS only and ER/DC were positive. FISH was amplified. Postop she was offered tamoxifen again or a different hormonal therapy, but she chose not to pursue that. In 2014, she had an abnormal mammogram and a subsequent needle biopsy confirmed DCIS of the right breast, which was triple-negative, S/P lumpectomy on 10/7/14, Final path showed infiltrating ductal carcinoma, which was completely excised (tumor size 2 cm), ER/DC negative, HER-2/marleny also negative by FISH). thus Triple Negative. Ki-67 was 53 percent  unfavorable. She thus received 4 doses of adjuvant chemo with A/C regimen between 2014 and 2015. Subsequently she was initiated on Taxol weekly and received 12 doses between Feb and May 2015. RT to Right Breast 6100 Rads completed 2015. In continued Clinical Remission since then  On no therapy.    Filgrastim Subcutaneous, 300 mcg  Cyclophosphamide IV,  mg, Dose modified  Doxorubicin IV,  mg, Dose modified  Doxorubicin + Cyclophosphamide (AC) + Paclitaxel (Weekly) (Paclitaxel only: Part 2 of 2) Cycle Length: 7 Number Cycles: 12 Start: Stephanie Bae on 2015 Assoc Dx: Primary malignant neoplasm of female breast (disorder) LOT: Adjuvant   Paclitaxel IV,  mg, Dose modified   Assessment & Plan  1. Infiltrating ductal carcinoma of the right breast Triple Negative:  Right lumpectomy September 2014, A/C, followed by Taxol completed in May 2105, followed by RT 6100 rads, completed in July of 2015. Continued clinical remission. No evidence of any recurrence at this time. Her last mammogram was October 4, 2017, BI-RADS category 2, benign. She will need yearly mammograms and continue to watch for recurrence. Past Medical History:   Past Medical History:   Diagnosis Date    Breast cancer ([facility] [address]     Cancer ([facility] [address]     Bilateral Breast    Chemotherapy-induced neuropathy ([facility] [address]     fingers and toes    Ductal carcinoma in situ (DCIS) of right breast     History of external beam radiation therapy 2015    [facility] [place]    History of external beam radiation therapy 2002    [facility] [place]    Lymphedema of right upper extremity     Pedal edema       Past Surgery History:   Past Surgical History:   Procedure Laterality Date    BREAST BIOPSY Left 2002    BREAST BIOPSY Right 2014    BREAST LUMPECTOMY Left 2002    BREAST LUMPECTOMY Right 2014    Right Lumpectomy with [place] Node Bx    BREAST SURGERY Left 2011    Mastectomy with [place] Bx and Reconstruction    IR BIOPSY LIVER PERCUTANEOUS  7/18/2022    IR BIOPSY LIVER PERCUTANEOUS 7/18/2022 [facility] SPECIAL PROCEDURES    TUNNELED VENOUS PORT PLACEMENT       Social History:   She denied smoking history. She drinks EtOH occasionally. She has one child. Family History: Many of her aunt, uncles, and cousins on the paternal side have various types of cancer, including breast cancer in one of her aunts around the age of 36, another aunt had ovarian cancer in her forties, and uncles had throat cancer, one of her cousins also possibly had breast cancer. Review of Systems: The remainder of ROS is unremarkable. Vital Signs: There were no vitals taken for this visit.      Physical Exam:  CONSTITUTIONAL: awake, alert, cooperative, no apparent distress   EYES: pupils equal and round, sclera clear and conjunctiva normal  ENT: Normocephalic, without obvious abnormality, atraumatic  NECK: supple, symmetrical, no jugular venous distension and no carotid bruits   HEMATOLOGIC/LYMPHATIC: no cervical, supraclavicular or axillary lymphadenopathy   LUNGS: no increased work of breathing and clear to auscultation   BREAST:S/p left mastectomy and right lumpectomy. CARDIOVASCULAR: regular rate and rhythm, normal S1 and S2, no murmur noted  ABDOMEN: normal bowel sound, soft, non-distended, tenderness to RUQ abdomen. No rebound or guarding. MUSCULOSKELETAL: full range of motion noted, tone is normal  NEUROLOGIC: She is on wheelchair. CN II - XII grossly intact. SKIN: Normal skin color, texture, turgor and no jaundice. appears intact   EXTREMITIES: no LE edema or cyanosis.        Labs:  Hematology:  Lab Results   Component Value Date    WBC 9.6 07/01/2022    RBC 5.03 07/01/2022    HGB 14.9 07/01/2022    HCT 43.4 07/01/2022    MCV 86.3 07/01/2022    MCH 29.6 07/01/2022    MCHC 34.3 07/01/2022    RDW 15.8 (H) 07/01/2022     07/01/2022    MPV 10.6 07/01/2022    SEGSPCT 65.1 07/01/2022    EOSRELPCT 0.4 07/01/2022    BASOPCT 0.4 07/01/2022    LYMPHOPCT 21.6 (L) 07/01/2022    MONOPCT 12.5 (H) 07/01/2022    SEGSABS 6.3 07/01/2022    EOSABS 0.0 07/01/2022    BASOSABS 0.0 07/01/2022    LYMPHSABS 2.1 07/01/2022    MONOSABS 1.2 07/01/2022    DIFFTYPE AUTOMATED DIFFERENTIAL 07/01/2022     Lab Results   Component Value Date    ESR 34 (H) 08/14/2017     Chemistry:  Lab Results   Component Value Date     (L) 07/01/2022    K 3.9 07/01/2022    CL 95 (L) 07/01/2022    CO2 26 07/01/2022    BUN 13 07/01/2022    CREATININE 1.3 (H) 07/19/2022    GLUCOSE 95 07/01/2022    CALCIUM 9.8 07/01/2022    PROT 8.0 07/01/2022    LABALBU 3.3 (L) 07/01/2022    BILITOT 1.4 (H) 07/01/2022    ALKPHOS 937 (H) 07/01/2022     (H) 07/01/2022     (H) 07/01/2022 LABGLOM 41 (L) 07/19/2022    GFRAA 50 (L) 07/19/2022     Lab Results   Component Value Date     10/03/2017     Lab Results   Component Value Date    TSHHS 2.631 08/26/2013     Immunology:  Lab Results   Component Value Date    PROT 8.0 07/01/2022    ALBUMINELP 3.7 10/03/2017    LABALPH 0.2 10/03/2017    LABALPH 0.6 10/03/2017    LABBETA 1.1 10/03/2017    GAMGLOB 1.4 10/03/2017     Tumor Markers:  Lab Results   Component Value Date     32.6 10/30/2014    CEA 1.3 06/02/2017    LABCA2 68.6 (H) 07/01/2022      Observations:  No data recorded     Assessment & Plan:  1. She has history of bilateral breast cancer as above. I reviewed CT findings. Liver bx in July 2022 showed metastatic triple negative breast cancer. Awaiting for Caris, PD-L1, MSI and genetic test result. I offered palliative chemo and she is agreeable to gemzar +/- immunotherapy. If she had BRCA mutation, I would switch to Leisa +/- immunotherapy or PARP inhibitor. I will schedule for chemo education. 2. MRI of brain was reviewed. I referred to RT onc. She will have gamma knife at [facility]    3. She has elevated LFT's related metastatic disease. 4. I referred for genetic counseling. 5. Constipation. I prescribed laxative. RUQ pain: she will take OTC pain medication. RTC in 3 weeks or sooner. All of her questions have been answered for today. I have discussed the above stated plan with the patient and they verbalized understanding and agreed with the plan.

## 2022-07-25 NOTE — PROGRESS NOTES
Pt referred to [facility] for [facility] per Dr. Addis Harding. Pt's referral, Consultation, MRI Brain and Demographics faxed to [facility] [phone], confirmation received. Requested MRI Brain images be pushed from [facility] to [employer]. Rx for Keppra and Protonix sent to [facility] as prescribed by Dr. Addis Harding. Pt requested and given ABEL Randhawa  contact for recommendations for supportive care. Pt with appointment 78/1/202, 6:00 AM arrival. [facility] instruction packet reviewed and given to pt. Contact numbers for [facility] given to pt, advised to call with any questions or concerns.

## 2022-07-25 NOTE — PROGRESS NOTES
[facility]  7/25/2022    CONSULT     Vitals:    07/25/22 1320   BP: 132/87   Pulse: (!) 101   Resp: 18   Temp: 97.5 °F (36.4 °C)   SpO2: 96%        Oxygen Therapy  SpO2: 96 %  Pulse Oximeter Device Mode: Continuous  Pulse Oximeter Device Location: Finger  O2 Device: None (Room air)  Skin Assessment: Clean, dry, & intact    Wt Readings from Last 3 Encounters:   07/25/22 209 lb (94.8 kg)   07/18/22 209 lb (94.8 kg)   07/01/22 209 lb (94.8 kg)        Pain Assessment  Pain Assessment: None - Denies Pain  Pain Level: 0  Multiple Pain Sites: No  Denies Need for Intervention    Fall Risk:    Fall risk  History of Falls, Dizziness, Uses Cane, Uses Wheelchair, Assist with Transfer/Ambulation, Provide Wheelchair PRN, Educate on Assistive Devices, and Re-Evaluate Weekly    Nutritional Alteration:  Loss of Appetite and Constipation  No Difficulties Swallowing  Voices Sufficient Oral Intake    Mediport: no    Pacemaker/ICD: no    Implants: BREAST IMPLANT     Previous XRT: yes, HERE    Assessment: Pt here for RAD consult to discuss TX options, states she has been having falls, dizziness, no energy, not much of an appetite.       Past Medical History:   Diagnosis Date    Breast cancer ([facility] [address]     Cancer ([facility] [address]     Bilateral Breast    Chemotherapy-induced neuropathy (HCC)     fingers and toes    Ductal carcinoma in situ (DCIS) of right breast     History of external beam radiation therapy 2015    [facility] [place]    History of external beam radiation therapy 2002    [facility] [place]    Lymphedema of right upper extremity     Pedal edema        Past Surgical History:   Procedure Laterality Date    BREAST BIOPSY Left 2002    BREAST BIOPSY Right 2014    BREAST LUMPECTOMY Left 2002    BREAST LUMPECTOMY Right 2014    Right Lumpectomy with [place] Node Bx    BREAST SURGERY Left 2011    Mastectomy with [place] Bx and Reconstruction    IR BIOPSY LIVER PERCUTANEOUS  7/18/2022    IR BIOPSY LIVER PERCUTANEOUS 7/18/2022 [facility] SPECIAL PROCEDURES    TUNNELED VENOUS PORT PLACEMENT         Allergies   Allergen Reactions    Penicillins     Tape Luis Standing Tape] Rash          Current Outpatient Medications:     dexamethasone (DECADRON) 4 MG tablet, Take 1 tablet by mouth in the morning and 1 tablet before bedtime. , Disp: 20 tablet, Rfl: 0    polyethylene glycol (GLYCOLAX) 17 GM/SCOOP powder, Take 17 g by mouth daily as needed (constipation), Disp: 1530 g, Rfl: 1    sennosides-docusate sodium (SENOKOT-S) 8.6-50 MG tablet, Take 2 tablets by mouth in the morning., Disp: 60 tablet, Rfl: 1    spironolactone-hydrochlorothiazide (ALDACTAZIDE) 50-50 MG per tablet, Take 1 tablet by mouth daily, Disp: 90 tablet, Rfl: 3    Pyridoxine HCl (VITAMIN B-6) 100 MG tablet, Take 100 mg by mouth daily, Disp: , Rfl:     vitamin B-12 (CYANOCOBALAMIN) 1000 MCG tablet, Take 1,000 mcg by mouth daily, Disp: , Rfl:     vitamin B-1 (THIAMINE) 100 MG tablet, Take 100 mg by mouth daily, Disp: , Rfl:     MULTIPLE VITAMIN PO, Take  by mouth., Disp: , Rfl:         Electronically signed by Norman Gimenez on 7/25/2022 at 1:27 PM

## 2022-07-25 NOTE — ONCOLOGY
Burton Wilkisnon is a pleasant 70 y.o. female with past medical history of bilateral breast cancesr status post chemotherapy and radiation as well as mastectomy and lumpectomy with last therapy in 2014 presented to ER at [facility] on 6/27/2022  with right upper quadrant abdominal pain for last 2 weeks after becoming dizzy and falling down a few steps. Currently denies headaches. She does have some a.m. nausea. No seizure activity. No motor weakness. No confusion. No problems with word finding. No memory issues although her son thinks she is a little more confused than normal.    Denies any bone pain. She does have some shortness of breath with exertion. Appetite has not been that great. Sylvester her work-up included-      US of abdomen 6/27/2022:  Hepatomegaly. Small liver lesions in the left lobe, indeterminate. Multiphasic CT of the liver is recommended to evaluate completely. No acute abnormality otherwise    CT CAP 6/28/2022:  7 cm diameter mass crossing the right major fissure, involving the right middle lobe and right lower lobe, with additional innumerable lung nodules measuring up to about 1.5 cm, compatible with metastatic disease; unclear whether the 7 cm mass is the primary, or diffuse metastatic disease is secondary to a separate primary such as known breast cancer. Innumerable liver lesions compatible with metastatic disease   T3 8mm vertebral body lucent lesion indeterminate. 6 mm medial right breast nodule. CT head 6/30/2022:    Partially calcified 1 cm mass within or overlying the right frontal lobe with adjacent cystic component or encephalomalacia within the right frontal lobe. No significant vasogenic edema, mass effect or midline shift. MRI of the brain from July 19, 2022    INTRACRANIAL STRUCTURES/VENTRICLES:  There is no acute infarct.  There is   enhancing, partially cystic and solid appearing mass within the right frontal   lobe measuring approximately 2.1 x 1.1 x 2.6 cm in diameter, with mild   associated susceptibility artifact and surrounding vasogenic edema. No bleed   or shift is identified. No other brain lesions are seen. Are asked to see her today regarding radiation therapy options    Underwent a liver biopsy. This appears once again to be a triple negative breast cancer. Past oncological history is as follows-      She  initially was diagnosed with Stage I left breast infiltrating ductal carcinoma in July 2002, ER/UT and HER-2/marleny positive by immunohistochemistry 3+. S/P lumpectomy and radiation therapy. She was treated only with Tamoxifen between June 2002 and June 2007. In August 2011 she had local recurrence, for which she underwent left skin-sparing mastectomy with sentinel node biopsy and reconstruction [Silicone Implant]. Final path showed DCIS only and ER/UT were positive. FISH was amplified. Postop she was offered tamoxifen again or a different hormonal therapy, but she chose not to pursue that. In September 2014, she had an abnor    Abnormal mammogram and a subsequent needle biopsy confirmed DCIS of the right breast, which was triple-negative, S/P lumpectomy on 10/7/14, Final path showed infiltrating ductal carcinoma, which was completely excised (tumor size 2 cm), ER/UT negative, HER-2/marleny also negative by FISH). thus Triple Negative. Ki-67 was 53 percent  unfavorable. She thus received 4 doses of adjuvant chemo with A/C regimen between November 2014 and January 2015. Subsequently she was initiated on Taxol weekly and received 12 doses between Feb and May 2015. RT to Right Breast 6100 Rads completed July 2015. Has been followed in up to recently labs and imaging studies have been negative. Medical history is as above. No diabetes no renal issues no implantable cardiac devices  Edema of the right upper extremity    Social history retired . She is G1, P1. Her son lives in [place] but is with her today. medication discussed. We reviewed and contrasted different treatment methods. We discussed whole brain irradiation which I did not recommend. I am hoping we will be able to treat her with single fraction radiosurgery although she is aware if this is grown quickly this may be outside of the tolerance for single fraction treatment and we may need to treat her in 5 treatments. I carefully reviewed the process of gamma knife radiosurgery including the need for anesthesia and frame placement. She understands she will get an MRI and CAT scan prior to treatment and we sometimes find more brain metastasis. We discussed the fact she should plan to be there all day. Both the patient and her son know she may develop headaches nausea or vomiting pin site infection and/or seizures. We discussed the risk and implications of radiation necrosis if that should develop. She is also aware we may find more than 1 brain metastasis. She is also aware that if we cannot treat her with a single fraction I prefer to treat her with 5 fractions rather than whole brain. She understands the goals of treatment are to try to obtain local tumor control. Is also aware that this will probably did be done by one of my colleagues. She is also aware she will need to be cleared for anesthesia. Plan-  Single fraction gamma knife radiosurgery if possible. If not likely 5 fraction. Patient will be on Decadron at a dose of 4 mg p.o. twice daily, Keppra starting 3 days before the procedure as well as protonix. Case discussed with Dr. Keturah Lopez as well.     Gamma knife is scheduled preliminarily for August 1 at [facility]

## 2022-07-26 ENCOUNTER — TELEPHONE (OUTPATIENT)
Dept: RADIATION ONCOLOGY | Age: 72
End: 2022-07-26

## 2022-07-26 DIAGNOSIS — C79.31 BRAIN METASTASIS (HCC): Primary | ICD-10-CM

## 2022-07-26 NOTE — TELEPHONE ENCOUNTER
Patient was seen today for [facility] consult. After consultation patient has decided to go forward with [facility] radiosurgery. Patient was also able to watch the Gamma Knife from the Patient's Perspective video and received Dr. Kristal Fitzpatrick biography. Patient's allergies, medications, and medical and surgery history were reviewed. Also reviewed pre-procedure instructions and provided patient with a written copy of the same. Patient will call with date of his/her H&P. We discussed the patient's date of procedure will be on    All patient's questions were answered. Encouraged to call with any further questions or concerns.     Arminda Davalos RN

## 2022-07-28 ENCOUNTER — OFFICE VISIT (OUTPATIENT)
Dept: INTERNAL MEDICINE CLINIC | Age: 72
End: 2022-07-28
Payer: MEDICARE

## 2022-07-28 VITALS — SYSTOLIC BLOOD PRESSURE: 140 MMHG | HEART RATE: 102 BPM | DIASTOLIC BLOOD PRESSURE: 80 MMHG | OXYGEN SATURATION: 95 %

## 2022-07-28 DIAGNOSIS — C79.31 BRAIN METASTASIS (HCC): ICD-10-CM

## 2022-07-28 DIAGNOSIS — G62.0 CHEMOTHERAPY-INDUCED NEUROPATHY (HCC): ICD-10-CM

## 2022-07-28 DIAGNOSIS — Z01.818 PRE-OP EVALUATION: Primary | ICD-10-CM

## 2022-07-28 DIAGNOSIS — T45.1X5A CHEMOTHERAPY-INDUCED NEUROPATHY (HCC): ICD-10-CM

## 2022-07-28 DIAGNOSIS — C50.819 MALIGNANT NEOPLASM OF OVERLAPPING SITES OF BREAST IN FEMALE, ESTROGEN RECEPTOR NEGATIVE, UNSPECIFIED LATERALITY (HCC): ICD-10-CM

## 2022-07-28 DIAGNOSIS — Z17.1 MALIGNANT NEOPLASM OF OVERLAPPING SITES OF BREAST IN FEMALE, ESTROGEN RECEPTOR NEGATIVE, UNSPECIFIED LATERALITY (HCC): ICD-10-CM

## 2022-07-28 DIAGNOSIS — R60.0 PEDAL EDEMA: ICD-10-CM

## 2022-07-28 PROCEDURE — 1123F ACP DISCUSS/DSCN MKR DOCD: CPT | Performed by: FAMILY MEDICINE

## 2022-07-28 PROCEDURE — 99214 OFFICE O/P EST MOD 30 MIN: CPT | Performed by: FAMILY MEDICINE

## 2022-07-28 PROCEDURE — 3017F COLORECTAL CA SCREEN DOC REV: CPT | Performed by: FAMILY MEDICINE

## 2022-07-28 PROCEDURE — G8417 CALC BMI ABV UP PARAM F/U: HCPCS | Performed by: FAMILY MEDICINE

## 2022-07-28 PROCEDURE — 1036F TOBACCO NON-USER: CPT | Performed by: FAMILY MEDICINE

## 2022-07-28 PROCEDURE — 1090F PRES/ABSN URINE INCON ASSESS: CPT | Performed by: FAMILY MEDICINE

## 2022-07-28 PROCEDURE — G8427 DOCREV CUR MEDS BY ELIG CLIN: HCPCS | Performed by: FAMILY MEDICINE

## 2022-07-28 PROCEDURE — G8399 PT W/DXA RESULTS DOCUMENT: HCPCS | Performed by: FAMILY MEDICINE

## 2022-07-28 RX ORDER — SPIRONOLACTONE AND HYDROCHLOROTHIAZIDE 50; 50 MG/1; MG/1
1 TABLET, FILM COATED ORAL DAILY
Qty: 90 TABLET | Refills: 3 | Status: ON HOLD | OUTPATIENT
Start: 2022-07-28 | End: 2022-08-08 | Stop reason: HOSPADM

## 2022-07-28 ASSESSMENT — ENCOUNTER SYMPTOMS
BACK PAIN: 0
BLOOD IN STOOL: 0
ABDOMINAL PAIN: 0
COUGH: 0
SHORTNESS OF BREATH: 1
NAUSEA: 0
DIARRHEA: 0
CONSTIPATION: 1

## 2022-07-28 NOTE — PROGRESS NOTES
Colton Wilkinson (:  1950) is a 70 y.o. female,established patient, here for evaluation of the following chief complaint(s): Other (Clearance for treatment /)         ASSESSMENT/PLAN:  1. Pre-op evaluation for [facility] Radiosurgery  Surgery with Dr. Husain  22    2. Malignant neoplasm of overlapping sites of breast in female, estrogen receptor negative, unspecified laterality [facility])  - Patient will have palliative chemotherapy with oncology    3. Brain metastasis ([facility] [address]    4. Chemotherapy-induced neuropathy ([facility] [address]    5. Pedal edema  - spironolactone-hydroCHLOROthiazide (ALDACTAZIDE) 50-50 MG per tablet; Take 1 tablet by mouth in the morning. Dispense: 90 tablet; Refill: 3    CT Chest 22 reviewed  Labs reviewed as listed below  No EKG on record. She is not sure if additional testing will be done at [facility]  I will call [facility] for more  information regarding the procedure. Due to her Sob with exertion she will have some risk with anesthesia  On this date 2022 I have spent 30 minutes reviewing previous notes, test results and face to face with the patient discussing the diagnosis and importance of compliance with the treatment plan as well as documenting on the day of the visit. Return in about 5 months (around 2022) for Check up, edema.        Lab Results   Component Value Date    WBC 9.6 2022    HGB 14.9 2022    HCT 43.4 2022    MCV 86.3 2022     2022     Lab Results   Component Value Date    CHOL 206 (H) 2014     Lab Results   Component Value Date    TRIG 96 2014     Lab Results   Component Value Date    HDL 76 2014     Lab Results   Component Value Date    LDLDIRECT 126 (H) 2014       Lab Results   Component Value Date     (L) 2022    K 3.9 2022    CL 95 (L) 2022    CO2 26 2022    BUN 13 2022    CREATININE 1.3 (H) 2022    GLUCOSE 95 2022    CALCIUM Neurological:  Negative for dizziness, light-headedness and headaches. Allergies   Allergen Reactions    Penicillins     Tape Electa Ghazi Tape] Rash     Current Outpatient Medications   Medication Sig Dispense Refill    spironolactone-hydroCHLOROthiazide (ALDACTAZIDE) 50-50 MG per tablet Take 1 tablet by mouth in the morning. 90 tablet 3    pantoprazole (PROTONIX) 40 MG tablet Take 1 tablet by mouth in the morning. 30 tablet 0    dexamethasone (DECADRON) 4 MG tablet Take 1 tablet by mouth in the morning and 1 tablet before bedtime. 20 tablet 0    polyethylene glycol (GLYCOLAX) 17 GM/SCOOP powder Take 17 g by mouth daily as needed (constipation) 1530 g 1    sennosides-docusate sodium (SENOKOT-S) 8.6-50 MG tablet Take 2 tablets by mouth in the morning. 60 tablet 1    Pyridoxine HCl (VITAMIN B-6) 100 MG tablet Take 100 mg by mouth daily      vitamin B-12 (CYANOCOBALAMIN) 1000 MCG tablet Take 1,000 mcg by mouth daily      vitamin B-1 (THIAMINE) 100 MG tablet Take 100 mg by mouth daily      MULTIPLE VITAMIN PO Take  by mouth.      levETIRAcetam (KEPPRA) 500 MG tablet Take 1 tablet by mouth in the morning and 1 tablet before bedtime. Start THREE days prior to [facility] Surgery. Begin 7/29/2022. (Patient not taking: Reported on 7/28/2022) 15 tablet 0     No current facility-administered medications for this visit. Vitals:    07/28/22 0952   BP: (!) 140/80   Site: Left Lower Arm   Position: Sitting   Cuff Size: Large Adult   Pulse: (!) 102   SpO2: 95%     Objective   Physical Exam  Vitals reviewed. Constitutional:       General: She is not in acute distress. Eyes:      General: No scleral icterus. Extraocular Movements: Extraocular movements intact. Cardiovascular:      Rate and Rhythm: Normal rate and regular rhythm. Pulmonary:      Effort: Pulmonary effort is normal. No respiratory distress. Breath sounds: Normal breath sounds. Abdominal:      Palpations: Abdomen is soft.       Tenderness:

## 2022-07-29 ENCOUNTER — HOSPITAL ENCOUNTER (OUTPATIENT)
Age: 72
Discharge: HOME | End: 2022-07-29
Payer: MEDICARE

## 2022-07-29 DIAGNOSIS — Z01.818 PREOPERATIVE EXAMINATION: Primary | ICD-10-CM

## 2022-07-29 PROCEDURE — 93005 ELECTROCARDIOGRAM TRACING: CPT | Performed by: FAMILY MEDICINE

## 2022-07-29 NOTE — PROGRESS NOTES
Discussed with patient about EKG- ordered for preop. Pt to obtain  Apparently we did not receive any request from her provider regarding any additional preop testing.  She will have deep sedation for her Gamma Knife procedure per Edgar Mcnamara

## 2022-07-30 LAB
EKG ATRIAL RATE: 102 BPM
EKG DIAGNOSIS: NORMAL
EKG P AXIS: 68 DEGREES
EKG P-R INTERVAL: 146 MS
EKG Q-T INTERVAL: 326 MS
EKG QRS DURATION: 78 MS
EKG QTC CALCULATION (BAZETT): 424 MS
EKG R AXIS: 13 DEGREES
EKG T AXIS: 47 DEGREES
EKG VENTRICULAR RATE: 102 BPM

## 2022-07-30 PROCEDURE — 93010 ELECTROCARDIOGRAM REPORT: CPT | Performed by: INTERNAL MEDICINE

## 2022-08-01 ENCOUNTER — ANESTHESIA (OUTPATIENT)
Dept: RADIATION ONCOLOGY | Age: 72
End: 2022-08-01

## 2022-08-01 ENCOUNTER — HOSPITAL ENCOUNTER (OUTPATIENT)
Dept: RADIATION ONCOLOGY | Age: 72
Discharge: HOME | End: 2022-08-01
Payer: MEDICARE

## 2022-08-01 ENCOUNTER — ANESTHESIA EVENT (OUTPATIENT)
Dept: RADIATION ONCOLOGY | Age: 72
End: 2022-08-01

## 2022-08-01 ENCOUNTER — HOSPITAL ENCOUNTER (OUTPATIENT)
Dept: MRI IMAGING | Age: 72
Discharge: HOME | End: 2022-08-01
Payer: MEDICARE

## 2022-08-01 ENCOUNTER — TELEPHONE (OUTPATIENT)
Dept: INTERNAL MEDICINE CLINIC | Age: 72
End: 2022-08-01

## 2022-08-01 VITALS
SYSTOLIC BLOOD PRESSURE: 120 MMHG | BODY MASS INDEX: 33.59 KG/M2 | DIASTOLIC BLOOD PRESSURE: 81 MMHG | OXYGEN SATURATION: 99 % | RESPIRATION RATE: 20 BRPM | HEART RATE: 102 BPM | WEIGHT: 209 LBS | TEMPERATURE: 96.8 F | HEIGHT: 66 IN

## 2022-08-01 LAB
ALBUMIN SERPL-MCNC: 2.6 G/DL (ref 3.4–5)
ANION GAP SERPL CALCULATED.3IONS-SCNC: 16 MMOL/L (ref 3–16)
BUN BLDV-MCNC: 32 MG/DL (ref 7–20)
CALCIUM SERPL-MCNC: 9.4 MG/DL (ref 8.3–10.6)
CHLORIDE BLD-SCNC: 89 MMOL/L (ref 99–110)
CO2: 24 MMOL/L (ref 21–32)
CREAT SERPL-MCNC: 0.8 MG/DL (ref 0.6–1.2)
GFR AFRICAN AMERICAN: >60
GFR NON-AFRICAN AMERICAN: >60
GLUCOSE BLD-MCNC: 94 MG/DL (ref 70–99)
PHOSPHORUS: 3.7 MG/DL (ref 2.5–4.9)
POTASSIUM SERPL-SCNC: 4.8 MMOL/L (ref 3.5–5.1)
SODIUM BLD-SCNC: 129 MMOL/L (ref 136–145)

## 2022-08-01 PROCEDURE — 77300 RADIATION THERAPY DOSE PLAN: CPT

## 2022-08-01 PROCEDURE — 7100000010 HC PHASE II RECOVERY - FIRST 15 MIN

## 2022-08-01 PROCEDURE — A9579 GAD-BASE MR CONTRAST NOS,1ML: HCPCS | Performed by: RADIOLOGY

## 2022-08-01 PROCEDURE — 2500000003 HC RX 250 WO HCPCS: Performed by: RADIOLOGY

## 2022-08-01 PROCEDURE — 80069 RENAL FUNCTION PANEL: CPT

## 2022-08-01 PROCEDURE — 77371 SRS MULTISOURCE: CPT

## 2022-08-01 PROCEDURE — 70553 MRI BRAIN STEM W/O & W/DYE: CPT

## 2022-08-01 PROCEDURE — 77295 3-D RADIOTHERAPY PLAN: CPT

## 2022-08-01 PROCEDURE — 77334 RADIATION TREATMENT AID(S): CPT

## 2022-08-01 PROCEDURE — 6360000002 HC RX W HCPCS: Performed by: RADIOLOGY

## 2022-08-01 PROCEDURE — 3700000000 HC ANESTHESIA ATTENDED CARE

## 2022-08-01 PROCEDURE — 6370000000 HC RX 637 (ALT 250 FOR IP): Performed by: NEUROLOGICAL SURGERY

## 2022-08-01 PROCEDURE — 6360000004 HC RX CONTRAST MEDICATION: Performed by: RADIOLOGY

## 2022-08-01 PROCEDURE — 77336 RADIATION PHYSICS CONSULT: CPT

## 2022-08-01 PROCEDURE — 36415 COLL VENOUS BLD VENIPUNCTURE: CPT

## 2022-08-01 PROCEDURE — 3700000001 HC ADD 15 MINUTES (ANESTHESIA)

## 2022-08-01 PROCEDURE — 7100000011 HC PHASE II RECOVERY - ADDTL 15 MIN

## 2022-08-01 RX ORDER — SODIUM CHLORIDE 9 MG/ML
INJECTION, SOLUTION INTRAVENOUS CONTINUOUS PRN
Status: DISCONTINUED | OUTPATIENT
Start: 2022-08-01 | End: 2022-08-01 | Stop reason: SDUPTHER

## 2022-08-01 RX ORDER — DEXAMETHASONE SODIUM PHOSPHATE 4 MG/ML
4 INJECTION, SOLUTION INTRA-ARTICULAR; INTRALESIONAL; INTRAMUSCULAR; INTRAVENOUS; SOFT TISSUE ONCE
Status: COMPLETED | OUTPATIENT
Start: 2022-08-01 | End: 2022-08-01

## 2022-08-01 RX ORDER — ONDANSETRON 2 MG/ML
4 INJECTION INTRAMUSCULAR; INTRAVENOUS ONCE
Status: COMPLETED | OUTPATIENT
Start: 2022-08-01 | End: 2022-08-01

## 2022-08-01 RX ORDER — LORAZEPAM 0.5 MG/1
1 TABLET ORAL EVERY 4 HOURS PRN
Status: DISCONTINUED | OUTPATIENT
Start: 2022-08-01 | End: 2022-08-02 | Stop reason: HOSPADM

## 2022-08-01 RX ORDER — BUPIVACAINE HYDROCHLORIDE 5 MG/ML
30 INJECTION, SOLUTION EPIDURAL; INTRACAUDAL ONCE
Status: COMPLETED | OUTPATIENT
Start: 2022-08-01 | End: 2022-08-01

## 2022-08-01 RX ORDER — LIDOCAINE HYDROCHLORIDE 10 MG/ML
30 INJECTION, SOLUTION EPIDURAL; INFILTRATION; INTRACAUDAL; PERINEURAL ONCE
Status: COMPLETED | OUTPATIENT
Start: 2022-08-01 | End: 2022-08-01

## 2022-08-01 RX ORDER — 0.9 % SODIUM CHLORIDE 0.9 %
500 INTRAVENOUS SOLUTION INTRAVENOUS ONCE
Status: DISCONTINUED | OUTPATIENT
Start: 2022-08-01 | End: 2022-08-02 | Stop reason: HOSPADM

## 2022-08-01 RX ORDER — MIDAZOLAM HYDROCHLORIDE 1 MG/ML
1 INJECTION INTRAMUSCULAR; INTRAVENOUS ONCE
Status: DISCONTINUED | OUTPATIENT
Start: 2022-08-01 | End: 2022-08-02 | Stop reason: HOSPADM

## 2022-08-01 RX ORDER — ACETAMINOPHEN 325 MG/1
650 TABLET ORAL EVERY 6 HOURS PRN
Status: DISCONTINUED | OUTPATIENT
Start: 2022-08-01 | End: 2022-08-02 | Stop reason: HOSPADM

## 2022-08-01 RX ORDER — BACITRACIN, NEOMYCIN, POLYMYXIN B 400; 3.5; 5 [USP'U]/G; MG/G; [USP'U]/G
OINTMENT TOPICAL ONCE
Status: DISCONTINUED | OUTPATIENT
Start: 2022-08-01 | End: 2022-08-02 | Stop reason: HOSPADM

## 2022-08-01 RX ORDER — SODIUM BICARBONATE 42 MG/ML
1.5 INJECTION, SOLUTION INTRAVENOUS ONCE
Status: COMPLETED | OUTPATIENT
Start: 2022-08-01 | End: 2022-08-01

## 2022-08-01 RX ORDER — PROPOFOL 10 MG/ML
INJECTION, EMULSION INTRAVENOUS PRN
Status: DISCONTINUED | OUTPATIENT
Start: 2022-08-01 | End: 2022-08-01 | Stop reason: SDUPTHER

## 2022-08-01 RX ORDER — LIDOCAINE HYDROCHLORIDE 20 MG/ML
INJECTION, SOLUTION INFILTRATION; PERINEURAL PRN
Status: DISCONTINUED | OUTPATIENT
Start: 2022-08-01 | End: 2022-08-01 | Stop reason: SDUPTHER

## 2022-08-01 RX ADMIN — DEXAMETHASONE SODIUM PHOSPHATE 4 MG: 4 INJECTION, SOLUTION INTRAMUSCULAR; INTRAVENOUS at 07:45

## 2022-08-01 RX ADMIN — LIDOCAINE HYDROCHLORIDE 50 MG: 20 INJECTION, SOLUTION INFILTRATION; PERINEURAL at 07:46

## 2022-08-01 RX ADMIN — PROPOFOL 20 MG: 10 INJECTION, EMULSION INTRAVENOUS at 07:43

## 2022-08-01 RX ADMIN — BUPIVACAINE HYDROCHLORIDE 150 MG: 5 INJECTION, SOLUTION EPIDURAL; INTRACAUDAL; PERINEURAL at 08:12

## 2022-08-01 RX ADMIN — ACETAMINOPHEN 650 MG: 325 TABLET ORAL at 11:18

## 2022-08-01 RX ADMIN — GADOTERIDOL 40 ML: 279.3 INJECTION, SOLUTION INTRAVENOUS at 09:56

## 2022-08-01 RX ADMIN — LIDOCAINE HYDROCHLORIDE 30 ML: 10 INJECTION, SOLUTION EPIDURAL; INFILTRATION; INTRACAUDAL; PERINEURAL at 07:45

## 2022-08-01 RX ADMIN — DEXAMETHASONE SODIUM PHOSPHATE 4 MG: 4 INJECTION, SOLUTION INTRAMUSCULAR; INTRAVENOUS at 12:12

## 2022-08-01 RX ADMIN — PROPOFOL 20 MG: 10 INJECTION, EMULSION INTRAVENOUS at 07:47

## 2022-08-01 RX ADMIN — SODIUM BICARBONATE 1.5 MEQ: 42 INJECTION, SOLUTION INTRAVENOUS at 07:45

## 2022-08-01 RX ADMIN — SODIUM CHLORIDE: 9 INJECTION, SOLUTION INTRAVENOUS at 07:39

## 2022-08-01 RX ADMIN — PROPOFOL 10 MG: 10 INJECTION, EMULSION INTRAVENOUS at 07:52

## 2022-08-01 RX ADMIN — ONDANSETRON 4 MG: 2 INJECTION INTRAMUSCULAR; INTRAVENOUS at 06:52

## 2022-08-01 RX ADMIN — LIDOCAINE HYDROCHLORIDE 50 MG: 20 INJECTION, SOLUTION INFILTRATION; PERINEURAL at 07:41

## 2022-08-01 ASSESSMENT — PAIN DESCRIPTION - LOCATION
LOCATION: BREAST;OTHER (COMMENT)
LOCATION: HEAD

## 2022-08-01 ASSESSMENT — PAIN DESCRIPTION - DESCRIPTORS
DESCRIPTORS: ACHING;PATIENT UNABLE TO DESCRIBE
DESCRIPTORS: PATIENT UNABLE TO DESCRIBE

## 2022-08-01 ASSESSMENT — PAIN DESCRIPTION - ORIENTATION: ORIENTATION: RIGHT

## 2022-08-01 ASSESSMENT — LIFESTYLE VARIABLES: SMOKING_STATUS: 0

## 2022-08-01 ASSESSMENT — PAIN SCALES - GENERAL: PAINLEVEL_OUTOF10: 7

## 2022-08-01 NOTE — OP NOTE
[address]  PATIENT NAME:   Tiarra Wilkinson   MR #:  [id number]  YOB: 1950   ACCOUNT #:  [de-identified]  SURGEON:  Tuan Springer MD   ADMIT DATE:  8/1/2022  SERVICE:  Neurosurgery  DICTATED BY: Tuan Springer MD   SURGERY DATE:  8/1/2022           OPERATIVE REPORT       PREOPERATIVE DIAGNOSIS:     1. Multiple brain metastases (breast cancer)     POSTOPERATIVE DIAGNOSIS:     1. Same    PROCEDURE(S) PERFORMED:    1. Placement of stereotactic head frame   2. Gamma Knife radiosurgery for two brain metastases    NEUROSURGEON: Tuan Springer MD       RADIATION ONCOLOGIST: Bernarda Fitzpatrick MD    ANESTHESIA: Moderate sedation    COMPLICATIONS: None    INDICATIONS: This is a 71-year-old woman with triple-negative breast cancer diagnosed in 2014 and treated with triple therapy. The patient recently presented with nausea and dizziness. MRI scan revealed a right frontal metastasis. We discussed various treatment options but ultimately recommended Gamma Knife radiosurgery. The patient was aware of the potential benefits in terms of tumor control and the possible risks including (but not limited to): pin site bleeding/swelling/infection, brain swelling, seizures, bleeding, new neurological symptoms, and/or radiation injury of the brain. PERFORMANCE STATUS: 80%    SYSTEMIC DISEASE: Stable    DETAILS OF PROCEDURE: The four pin sites were cleaned with Chloraprep and injected with a mixture of Lidocaine 1%, Marcaine 0.5%, and sodium bicarbonate. The stereotactic head frame was secured with titanium screws. The patient underwent a stereotactic MRI scan with contrast. These images were sent over the network to the [address] [place] [address]. The targets and critical structures were contoured. An optimal treatment plan was developed by the neurosurgeon, radiation oncologist, and medical physicist. All critical structure constraints were fulfilled.     The patient then underwent Gamma Knife radiosurgery using the following parameters:    Target Size Volume Shape Shots Dose Isodose    cm cc   Gy %   Right frontal 2.29 4.397 Oval 32 18 53   Right vermis 0.35 0.017 Round 1 22 80     The patient tolerated the procedure without difficulty and the stereotactic frame was removed uneventfully. The patient was instructed on pin site care and medications.     SPECIMENS REMOVED: None    ESTIMATED BLOOD LOSS: None    TOTAL TIME SPENT WITH PATIENT: In conformance with CMS regulations, I affirm that I was present for the entire neurosurgical portion of the procedure including stereotactic frame placement, target definition, development of the treatment plan, treatment delivery, and stereotactic frame removal.     Dimas Romero MD

## 2022-08-01 NOTE — TELEPHONE ENCOUNTER
----- Message from Lucy Castaneda sent at 7/29/2022 11:46 AM EDT -----  Subject: Message to Provider    QUESTIONS  Information for Provider? julius - opal called in regards to confirm if   mom was cleared for her surgery she is having taken place on 8/1 . Patient   needed a clearance because patient is needing anesthesia . Please reach out   as soon as possible patient is needed to be cleared for 8/1. The number is   mr Manuel Joseph   ---------------------------------------------------------------------------  --------------  Nicole Paulson INFO  [phone]; OK to leave message on voicemail  ---------------------------------------------------------------------------  --------------  SCRIPT ANSWERS  Relationship to Patient? Sibling  Representative Name? belen joseph   Is the Representative on the appropriate HIPAA document in Epic?  Yes

## 2022-08-01 NOTE — PROGRESS NOTES
2158  Patient arrived to [address] [place] [address] Way alert and oriented x 4 with son. Patient is neurologically intact. PIV established without difficulty. Initial vitals WNL, and patient denies pain. Lashell Hou RN     7641  Gamma Knife RN Pre-Procedure Checklist     1. Has the patient ever had any surgery on the head or neck? No    -If yes, is the surgery site marked? N/A    2. Has the patient ever received radiation treatment to the head or neck? No      -If yes, is the treatment summary and/or disk of treatment plan available? n/a      -If the patient has had previous Gamma Knife radiosurgery has the most recent imaging been reviewed in the Gamma Plan? N/a      3. Has the patient received chemotherapy or immunotherapy in the past month? No      -If yes, list the agent and date of last treatment. n/a    4. Is patient [de-identified] or older? No      -If yes, using all aluminum pins? No    5. List the procedure-specific medications taken by the patient this morning:   Keppra 500mg  Dexamethasone 4mg     6. What is the patients GFR? 79    -For GFR 0-30 => no contrast at MRI    -For GFR 31-59 => single dose contrast at MRI    -For GFR >60 => double dose contrast at MRI    7. Does the patient require a pregnancy test per [address]?  no     -If yes, the result is: na    8. What is the patient's baseline CO2?     29      0741  Gamma Knife Frame Placement Time Out     1. Patient states name and birthdate correctly? Yes    2. Procedure listed on consent:  G-Frame placement and Gamma Knife radiosurgery for Right Frontal Metastasis    3. Is this the correct procedure? Yes    4. Are the consents signed? Yes    5. Does the patient have only one benign target or lesion? No     -If yes, what side are we treating:  N/A     -Is the side marked for laterality? N/A    6. Have films been reviewed today? Yes    7. Has the interim History and Physical form been completed? yes    8.   Has the neurosurgeon reviewed the Gamma Knife RN Pre-Procedure Checklist?  Yes    9. Does the patient require a pregnancy test per [address]? no     -If yes, the result was:  na    10. Are all present in agreement? Yes    Those present for time out:  Dr. Stephan Giron RN, Mike Melendez CRNA    7756  Patient received MAC under the supervision of Dr. Taran Biswas. This RN was present throughout [place] [address] and assumed care from anesthesia for Phase II recovery. The patient is awake and breathing easily. Patient denies pain. See Flow Sheet for vitals and Lukasz Score. Mirna Randall RN    0684  After G-frame placement, patient was taken to MRI by this RN where SpO2 and pulse were monitored and remained stable throughout. Patient tolerated the study well. Mirnaveto Randall RN    2725  Gamma Knife Radiation Delivery Time Out    1. Patient states name and birthdate correctly? Yes    2. Procedure listed on consent? Gamma Knife Stereotactic Radiosurgery, for Right Frontal Metastasis    3. Is this the correct procedure? Yes    4. Does the patient have only one benign target or lesion? No    -If yes, what side are we treating? N/A    -If yes, is the side marked for laterality? N/A    5. Has the final radiologist report been reviewed? yes    6. Has the patient received IV Dexamethasone prior to radiation delivery? yes    7. Does the patient require Keppra or Ativan prior to treatment delivery? yes    8. Have the pin torques been rechecked? Yes    9. Is a CBCT required prior to treatment delivery? Yes    10. Are all present in agreement? Yes    11. Those present for time out:  Dennie Medin MP, Dr. Aldo Schreiber MP, Mirna Dowellshandra RN    [phone]  Patient brought to [facility] suite and placed on treatment couch. Sequential Compression Devices placed on BLE's per [address] VTE Protocol. AV monitoring in place and verified verbally with patient from console.  Continuous SpO2 and pulse monitor visible and monitored by this RN. 1400  After Gamma Knife procedure, the G-frame was removed by Thad Womack RN and Amelia Dodson RN and fixation pin sites were observed for bleeding. None was noted. Pin sites were cleaned with alcohol and povidone-iodine. Dr. Yasmine Guerra then placed sutures at all four pin sites. 1425  Patient met Phase II discharge criteria. Baseline neurological status unchanged. See discharge Lukasz score and vitals. 1435  Discharge instructions were reviewed with patient and patient's son who verbalized understanding using teach back method. A written copy of the instructions along with a steroid taper calendar was also given. Patient has follow up appointments scheduled. [phone]  Patient was accompanied to transportation by this RN.     Thad Womack RN

## 2022-08-01 NOTE — ANESTHESIA PRE PROCEDURE
Department of Anesthesiology  Preprocedure Note       Name:  Bud Pump   Age:  70 y.o.  :  1950                                          MRN:  [id number]         Date:  2022      Surgeon: * Surgery not found *    Procedure:     Medications prior to admission:   Prior to Admission medications    Medication Sig Start Date End Date Taking? Authorizing Provider   spironolactone-hydroCHLOROthiazide (ALDACTAZIDE) 50-50 MG per tablet Take 1 tablet by mouth in the morning. 22   Nay Aj DO   levETIRAcetam (KEPPRA) 500 MG tablet Take 1 tablet by mouth in the morning and 1 tablet before bedtime. Start THREE days prior to [facility]. Begin 2022. Patient not taking: Reported on 2022   Pa Valladares MD   pantoprazole (PROTONIX) 40 MG tablet Take 1 tablet by mouth in the morning. 22  Pa Valladares MD   dexamethasone (DECADRON) 4 MG tablet Take 1 tablet by mouth in the morning and 1 tablet before bedtime. 22   Tiera Perez MD   polyethylene glycol (GLYCOLAX) 17 GM/SCOOP powder Take 17 g by mouth daily as needed (constipation) 22  Tiera Perez MD   sennosides-docusate sodium (SENOKOT-S) 8.6-50 MG tablet Take 2 tablets by mouth in the morning. 22  Tiera Perez MD   Pyridoxine HCl (VITAMIN B-6) 100 MG tablet Take 100 mg by mouth daily    Historical Provider, MD   vitamin B-12 (CYANOCOBALAMIN) 1000 MCG tablet Take 1,000 mcg by mouth daily    Historical Provider, MD   vitamin B-1 (THIAMINE) 100 MG tablet Take 100 mg by mouth daily    Historical Provider, MD   MULTIPLE VITAMIN PO Take  by mouth. Historical Provider, MD       Current medications:    Current Outpatient Medications   Medication Sig Dispense Refill    spironolactone-hydroCHLOROthiazide (ALDACTAZIDE) 50-50 MG per tablet Take 1 tablet by mouth in the morning.  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, T45.1X5A    Pedal edema R60.0    Lymphedema of right upper extremity I89.0    Chronic renal disease, stage III ([facility] [366724] N18.30    Malignant neoplasm of overlapping sites of breast in female, estrogen receptor negative ([facility] C50.819, Z17.1    Brain metastasis (HCC) C79.31       Past Medical History:        Diagnosis Date    Breast cancer ([facility]     Cancer ([facility]     Bilateral Breast    Chemotherapy-induced neuropathy ([facility]     fingers and toes    Ductal carcinoma in situ (DCIS) of right breast     History of external beam radiation therapy 2015    [facility]    History of external beam radiation therapy 2002    Left Breast/  [facility]    Lymphedema of right upper extremity     Pedal edema        Past Surgical History:        Procedure Laterality Date    BREAST BIOPSY Left 2002    BREAST BIOPSY Right 2014    BREAST LUMPECTOMY Left 2002    BREAST LUMPECTOMY Right 2014    Right Lumpectomy with Blakeslee Node Bx    BREAST SURGERY Left 2011    Mastectomy with Blakeslee Bx and Reconstruction    IR BIOPSY LIVER PERCUTANEOUS  7/18/2022    IR BIOPSY LIVER PERCUTANEOUS 7/18/2022 [facility]    TUNNELED VENOUS PORT PLACEMENT         Social History:    Social History     Tobacco Use    Smoking status: Never    Smokeless tobacco: Never   Substance Use Topics    Alcohol use:  No                                Counseling given: Not Answered      Vital Signs (Current):   Vitals:    08/01/22 0600 08/01/22 0621   BP:  116/89   Pulse:  (!) 112   Resp:  20   Temp:  96.8 °F (36 °C)   TempSrc:  Temporal   SpO2:  98%   Weight: 209 lb (94.8 kg)    Height: 5' 6\" (1.676 m)                                               BP Readings from Last 3 Encounters:   08/01/22 116/89   07/28/22 (!) 140/80   07/25/22 132/87       NPO Status: Time of last liquid consumption: 2300                        Time of last solid consumption: 2300                                                      BMI:   Wt Readings from Last 3 Encounters:   08/01/22 209 lb (94.8 kg)   07/25/22 209 lb (94.8 kg)   07/18/22 209 lb (94.8 kg)     Body mass index is 33.73 kg/m². CBC:   Lab Results   Component Value Date/Time    WBC 9.6 07/01/2022 09:41 AM    RBC 5.03 07/01/2022 09:41 AM    HGB 14.9 07/01/2022 09:41 AM    HCT 43.4 07/01/2022 09:41 AM    MCV 86.3 07/01/2022 09:41 AM    RDW 15.8 07/01/2022 09:41 AM     07/01/2022 09:41 AM       CMP:   Lab Results   Component Value Date/Time     07/01/2022 09:41 AM    K 3.9 07/01/2022 09:41 AM    CL 95 07/01/2022 09:41 AM    CO2 26 07/01/2022 09:41 AM    BUN 13 07/01/2022 09:41 AM    CREATININE 1.3 07/19/2022 08:55 AM    CREATININE 0.9 07/01/2022 09:41 AM    GFRAA 50 07/19/2022 08:55 AM    LABGLOM 41 07/19/2022 08:55 AM    GLUCOSE 95 07/01/2022 09:41 AM    PROT 8.0 07/01/2022 09:41 AM    PROT 7.8 09/19/2011 11:33 AM    CALCIUM 9.8 07/01/2022 09:41 AM    BILITOT 1.4 07/01/2022 09:41 AM    ALKPHOS 937 07/01/2022 09:41 AM     07/01/2022 09:41 AM     07/01/2022 09:41 AM       POC Tests: No results for input(s): POCGLU, POCNA, POCK, POCCL, POCBUN, POCHEMO, POCHCT in the last 72 hours.     Coags:   Lab Results   Component Value Date/Time    PROTIME 17.5 07/18/2022 08:40 AM    INR 1.35 07/18/2022 08:40 AM    APTT 32.0 07/18/2022 08:40 AM       HCG (If Applicable): No results found for: PREGTESTUR, PREGSERUM, HCG, HCGQUANT     ABGs: No results found for: PHART, PO2ART, FEI6XSN, CJV0CER, BEART, K4UWEDQE     Type & Screen (If Applicable):  No results found for: LABABO, LABRH    Drug/Infectious Status (If Applicable):  No results found for: HIV, HEPCAB    COVID-19 Screening (If Applicable): No results found for: COVID19        Anesthesia Evaluation  Patient summary reviewed and Nursing notes reviewed no history of anesthetic complications:   Airway: Mallampati: II  TM distance: >3 FB   Neck ROM: full  Mouth opening: > = 3 FB   Dental: normal exam         Pulmonary: breath sounds clear to auscultation      (-) not a current smoker (never)                           Cardiovascular:  Exercise tolerance: good (>4 METS),       (-) past MI    NYHA Classification: II    Rhythm: regular  Rate: normal           Beta Blocker:  Not on Beta Blocker         Neuro/Psych:                ROS comment: Brain mets  GI/Hepatic/Renal:        (-) GERD       Endo/Other:    (+) malignancy/cancer (2015 breast ca  3rd time  had chemo  now with brain mets/  radiation x 2  as well). Abdominal:             Vascular: negative vascular ROS. Other Findings:           Anesthesia Plan      MAC     ASA 4       Induction: intravenous. MIPS: Prophylactic antiemetics administered. Anesthetic plan and risks discussed with patient and child/children. Plan discussed with CRNA.     Attending anesthesiologist reviewed and agrees with Preprocedure content                Marina Carcamo DO   8/1/2022

## 2022-08-01 NOTE — ONCOLOGY
Gamma Knife Radiosurgery Procedure Note      Patient: Elizabeth Ann Senior  Date: 8/1/2022    Diagnosis:Multifocal brain Metastases    Procedure: Gamma Knife Based Stereotactic Radiosurgery [facility])    Description of procedure:  Elizabeth Ann was met at the [facility] at 2:36 PM on 8/1/2022 by Dr. José Miguel Penn and myself. Serena Cook MD]    Conscious sedation and frame placement were completed by Dr. José Miguel Penn. A stereotactic MRI brain with double-dose contrast was then completed. Dosimetry of [address] is summarized as follows: 1. Right cerebellar vermis metastasis: 22 Gy.  2. Right frontal lobe: 18 Gy. Dosimetry was reviewed by \"Dr. Brayden Biswas MS (special physics consult requested) and myself. Treatment was then given successfully. The frame was removed without complication. The patient was discharged home in stable condition. She was placed on a steroid taper. She will followup with me with reimaging in 3 mo.       Serena Cook MD

## 2022-08-01 NOTE — H&P
The patient was seen and examined. There have been no changes in the patient's condition since the history and physical.    I reviewed the benefits, risks, and alternatives and the patient consents to the procedure. Chandler Serrano MD

## 2022-08-01 NOTE — DISCHARGE INSTRUCTIONS
GAMMA KNIFE POST-PROCEDURE INSTRUCTIONS    You may gently wash your hair and face two days after your procedure. Be gentle and do not rub the pin sites. Pat areas dry. WHAT TO DO IF YOU EXPERIENCE BLEEDING AT THE PIN SITES:  Remove any steri strips or band-aids that may be in place at the site that is bleeding. Using the gauze pads given to you by the [facility] RN, apply direct, one finger pressure continuously for 5 minutes. After 5 minutes inspect the site for bleeding. If the bleeding continues, repeat holding pressure, this time for 10 minutes. After 10 minutes, inspect the site again. If bleeding continues, proceed to the nearest emergency room for placement of a suture (stitch). If you have sutures (stitches) in place, those will dissolve on their own in about 2-3wweks. You may shower with them, but keep them clean and dry otherwise. Avoid hair spray, mousse, gels, hair color, permanent solutions, or any other products that could cause irritation until your pin sites heal.  You may notice blood-tinged water when washing your hair. This may be dried blood in your hair from the application of the head frame. Swelling above the eyes may occur within 48 hours of surgery. You may apply cool compresses to your forehead if swelling occurs. Some patients experiences tingling or temporary numbness above the pin sites. You may return to work or school after 24 hours if you feel well enough. You should resume all of your regular activities unless the physician has instructed you otherwise. You may resume your usual diet right away. You have undergone a procedure with sedation. You have been given medicines that affect your judgment or impair your ability to operate heavy machines and automobiles. You may not drive for 24 hours after your procedure. Please see the attached information regarding Sedation.   If you develop any signs of infection (redness, swelling, drainage or irritation at pin sites, fever >101 F for more than 24 hours), call Dr. Antonieta Anderson office at [phone]. Follow up with your physician as directed. Please contact Dr. Jodie Tai at [phone] with any new vision changes, balance problems, numbness/tingling, or severe headache. If you have an emergent concern and need to be seen by a physician immediately, please go to [facility] emergency room. Steroid taper:  Continue taking Dexamethasone 4mg, 1-4mg tablet this evening, then 4mg 2 times a day for 3 days then 4mg daily for 3 days and then 2mg daily for 3 days and STOP! Continue taking Protonix 40mg daily while taking Dexamethasone and then STOP! Continue taking your Keppra as prescribed until your 2 week follow up phone call. If you have any questions during regular business hours, call the [facility] nurse at [phone]. For questions during off-business hours and on weekends, contact Dr. Antonieta Anderson office directly at [phone]. For Keven Lopez or El call [phone].

## 2022-08-01 NOTE — ANESTHESIA POSTPROCEDURE EVALUATION
Department of Anesthesiology  Postprocedure Note    Patient: Nancy Brown  MRN: [id number]  YOB: 1950  Date of evaluation: 8/1/2022      Procedure Summary     Date: 08/01/22 Room / Location: [address] [place] [address]    Anesthesia Start: 0737 Anesthesia Stop: 0804    Procedure: GAMMAKNIFE W ANESTHESIA Diagnosis:     Scheduled Providers: Sammi Abarca DO Responsible Provider: Sammi Abarca DO    Anesthesia Type: MAC ASA Status: 4          Anesthesia Type: No value filed.     Lukasz Phase I:      Lukasz Phase II:        Anesthesia Post Evaluation    Patient location during evaluation: PACU  Patient participation: complete - patient participated  Level of consciousness: awake and alert  Pain score: 0  Airway patency: patent  Nausea & Vomiting: no nausea and no vomiting  Complications: no  Cardiovascular status: hemodynamically stable  Respiratory status: acceptable  Hydration status: stable

## 2022-08-02 ENCOUNTER — POST-OP TELEPHONE (OUTPATIENT)
Dept: RADIATION ONCOLOGY | Age: 72
End: 2022-08-02

## 2022-08-02 ENCOUNTER — TELEPHONE (OUTPATIENT)
Dept: INTERNAL MEDICINE CLINIC | Age: 72
End: 2022-08-02

## 2022-08-02 DIAGNOSIS — C79.31 BRAIN METASTASIS (HCC): Primary | ICD-10-CM

## 2022-08-02 RX ORDER — DEXAMETHASONE 4 MG/1
4 TABLET ORAL 2 TIMES DAILY
Qty: 20 TABLET | Refills: 0 | Status: ON HOLD | OUTPATIENT
Start: 2022-08-02 | End: 2022-08-08 | Stop reason: SDUPTHER

## 2022-08-02 RX ORDER — LEVETIRACETAM 500 MG/1
500 TABLET ORAL 2 TIMES DAILY
Qty: 60 TABLET | Refills: 3 | Status: ON HOLD | OUTPATIENT
Start: 2022-08-02 | End: 2022-08-08 | Stop reason: HOSPADM

## 2022-08-02 NOTE — TELEPHONE ENCOUNTER
Patient's son, Ford Le, called stating her has concerns that patient is dehydrated. States that she had radiation therapy yesterday. Per Ford Le, patient has not urinated since around 9 a.m. this morning. She is not taking in much fluids and is weak. He wants to know what he can do to help her or if she needs to go to ER.

## 2022-08-02 NOTE — TELEPHONE ENCOUNTER
Patients son called stating she is in need of decadron refill.  Please send in to preferred pharmacy

## 2022-08-03 ENCOUNTER — APPOINTMENT (OUTPATIENT)
Dept: GENERAL RADIOLOGY | Age: 72
DRG: 871 | End: 2022-08-03
Payer: MEDICARE

## 2022-08-03 ENCOUNTER — HOSPITAL ENCOUNTER (INPATIENT)
Age: 72
LOS: 5 days | Discharge: HOSPICE | DRG: 871 | End: 2022-08-08
Attending: EMERGENCY MEDICINE | Admitting: STUDENT IN AN ORGANIZED HEALTH CARE EDUCATION/TRAINING PROGRAM
Payer: MEDICARE

## 2022-08-03 ENCOUNTER — APPOINTMENT (OUTPATIENT)
Dept: CT IMAGING | Age: 72
DRG: 871 | End: 2022-08-03
Payer: MEDICARE

## 2022-08-03 DIAGNOSIS — C79.31 BRAIN METASTASIS (HCC): ICD-10-CM

## 2022-08-03 DIAGNOSIS — R65.21 SEPTIC SHOCK (HCC): Primary | ICD-10-CM

## 2022-08-03 DIAGNOSIS — C50.819 MALIGNANT NEOPLASM OF OVERLAPPING SITES OF BREAST IN FEMALE, ESTROGEN RECEPTOR NEGATIVE, UNSPECIFIED LATERALITY (HCC): ICD-10-CM

## 2022-08-03 DIAGNOSIS — C50.919 METASTATIC BREAST CANCER (HCC): ICD-10-CM

## 2022-08-03 DIAGNOSIS — C50.919 MALIGNANT NEOPLASM OF FEMALE BREAST, UNSPECIFIED ESTROGEN RECEPTOR STATUS, UNSPECIFIED LATERALITY, UNSPECIFIED SITE OF BREAST (HCC): ICD-10-CM

## 2022-08-03 DIAGNOSIS — A41.9 SEPTIC SHOCK (HCC): Primary | ICD-10-CM

## 2022-08-03 DIAGNOSIS — Z17.1 MALIGNANT NEOPLASM OF OVERLAPPING SITES OF BREAST IN FEMALE, ESTROGEN RECEPTOR NEGATIVE, UNSPECIFIED LATERALITY (HCC): ICD-10-CM

## 2022-08-03 DIAGNOSIS — E80.6 HYPERBILIRUBINEMIA: ICD-10-CM

## 2022-08-03 LAB
ALBUMIN SERPL-MCNC: 2.3 GM/DL (ref 3.4–5)
ALP BLD-CCNC: 3403 IU/L (ref 40–128)
ALT SERPL-CCNC: 429 U/L (ref 10–40)
AMMONIA: 75 UMOL/L (ref 11–51)
ANION GAP SERPL CALCULATED.3IONS-SCNC: 16 MMOL/L (ref 4–16)
AST SERPL-CCNC: 732 IU/L (ref 15–37)
BACTERIA: ABNORMAL /HPF
BASOPHILS ABSOLUTE: 0 K/CU MM
BASOPHILS RELATIVE PERCENT: 0.2 % (ref 0–1)
BILIRUB SERPL-MCNC: 17.6 MG/DL (ref 0–1)
BILIRUBIN DIRECT: 13.1 MG/DL (ref 0–0.3)
BILIRUBIN URINE: ABNORMAL MG/DL
BLOOD, URINE: ABNORMAL
BUN BLDV-MCNC: 42 MG/DL (ref 6–23)
CALCIUM SERPL-MCNC: 9.8 MG/DL (ref 8.3–10.6)
CHLORIDE BLD-SCNC: 91 MMOL/L (ref 99–110)
CLARITY: CLEAR
CO2: 23 MMOL/L (ref 21–32)
COLOR: ABNORMAL
CREAT SERPL-MCNC: 0.6 MG/DL (ref 0.6–1.1)
DIFFERENTIAL TYPE: ABNORMAL
EKG ATRIAL RATE: 91 BPM
EKG DIAGNOSIS: NORMAL
EKG P AXIS: 70 DEGREES
EKG P-R INTERVAL: 148 MS
EKG Q-T INTERVAL: 358 MS
EKG QRS DURATION: 76 MS
EKG QTC CALCULATION (BAZETT): 440 MS
EKG R AXIS: 29 DEGREES
EKG T AXIS: 60 DEGREES
EKG VENTRICULAR RATE: 91 BPM
EOSINOPHILS ABSOLUTE: 0 K/CU MM
EOSINOPHILS RELATIVE PERCENT: 0 % (ref 0–3)
GFR AFRICAN AMERICAN: >60 ML/MIN/1.73M2
GFR NON-AFRICAN AMERICAN: >60 ML/MIN/1.73M2
GLUCOSE BLD-MCNC: 104 MG/DL (ref 70–99)
GLUCOSE, URINE: NEGATIVE MG/DL
GRANULAR CASTS: 4 /LPF
HCT VFR BLD CALC: 47.6 % (ref 37–47)
HEMOGLOBIN: 16.9 GM/DL (ref 12.5–16)
HYALINE CASTS: 2 /LPF
IMMATURE NEUTROPHIL %: 2.5 % (ref 0–0.43)
KETONES, URINE: NEGATIVE MG/DL
LACTATE: 3.8 MMOL/L (ref 0.4–2)
LACTATE: 4.7 MMOL/L (ref 0.4–2)
LEUKOCYTE ESTERASE, URINE: NEGATIVE
LYMPHOCYTES ABSOLUTE: 0.9 K/CU MM
LYMPHOCYTES RELATIVE PERCENT: 5 % (ref 24–44)
MCH RBC QN AUTO: 29.7 PG (ref 27–31)
MCHC RBC AUTO-ENTMCNC: 35.5 % (ref 32–36)
MCV RBC AUTO: 83.7 FL (ref 78–100)
MONOCYTES ABSOLUTE: 1.6 K/CU MM
MONOCYTES RELATIVE PERCENT: 8.6 % (ref 0–4)
MUCUS: ABNORMAL HPF
NITRITE URINE, QUANTITATIVE: NEGATIVE
NON SQUAM EPI CELLS: <1 /HPF
NUCLEATED RBC %: 0.5 %
PDW BLD-RTO: 24.8 % (ref 11.7–14.9)
PH, URINE: 6 (ref 5–8)
PLATELET # BLD: 152 K/CU MM (ref 140–440)
PMV BLD AUTO: 10.7 FL (ref 7.5–11.1)
POTASSIUM SERPL-SCNC: 5.1 MMOL/L (ref 3.5–5.1)
PRO-BNP: 299.3 PG/ML
PROTEIN UA: NEGATIVE MG/DL
RBC # BLD: 5.69 M/CU MM (ref 4.2–5.4)
RBC CASTS: 3 /LPF
RBC URINE: 1 /HPF (ref 0–6)
SEGMENTED NEUTROPHILS ABSOLUTE COUNT: 15.1 K/CU MM
SEGMENTED NEUTROPHILS RELATIVE PERCENT: 83.7 % (ref 36–66)
SODIUM BLD-SCNC: 130 MMOL/L (ref 135–145)
SPECIFIC GRAVITY UA: 1.02 (ref 1–1.03)
SQUAMOUS EPITHELIAL: <1 /HPF
TOTAL IMMATURE NEUTOROPHIL: 0.45 K/CU MM
TOTAL NUCLEATED RBC: 0.1 K/CU MM
TOTAL PROTEIN: 7.3 GM/DL (ref 6.4–8.2)
TRICHOMONAS: ABNORMAL /HPF
TROPONIN T: <0.01 NG/ML
UROBILINOGEN, URINE: 1 MG/DL (ref 0.2–1)
WBC # BLD: 18.1 K/CU MM (ref 4–10.5)
WBC UA: 2 /HPF (ref 0–5)

## 2022-08-03 PROCEDURE — 81001 URINALYSIS AUTO W/SCOPE: CPT

## 2022-08-03 PROCEDURE — 6370000000 HC RX 637 (ALT 250 FOR IP): Performed by: NURSE PRACTITIONER

## 2022-08-03 PROCEDURE — 96365 THER/PROPH/DIAG IV INF INIT: CPT

## 2022-08-03 PROCEDURE — 93010 ELECTROCARDIOGRAM REPORT: CPT | Performed by: INTERNAL MEDICINE

## 2022-08-03 PROCEDURE — 74177 CT ABD & PELVIS W/CONTRAST: CPT

## 2022-08-03 PROCEDURE — 1200000000 HC SEMI PRIVATE

## 2022-08-03 PROCEDURE — 6360000004 HC RX CONTRAST MEDICATION: Performed by: EMERGENCY MEDICINE

## 2022-08-03 PROCEDURE — 82140 ASSAY OF AMMONIA: CPT

## 2022-08-03 PROCEDURE — 84484 ASSAY OF TROPONIN QUANT: CPT

## 2022-08-03 PROCEDURE — 99285 EMERGENCY DEPT VISIT HI MDM: CPT

## 2022-08-03 PROCEDURE — 83605 ASSAY OF LACTIC ACID: CPT

## 2022-08-03 PROCEDURE — 2500000003 HC RX 250 WO HCPCS: Performed by: EMERGENCY MEDICINE

## 2022-08-03 PROCEDURE — 6360000002 HC RX W HCPCS: Performed by: EMERGENCY MEDICINE

## 2022-08-03 PROCEDURE — 36415 COLL VENOUS BLD VENIPUNCTURE: CPT

## 2022-08-03 PROCEDURE — 2580000003 HC RX 258: Performed by: NURSE PRACTITIONER

## 2022-08-03 PROCEDURE — 84443 ASSAY THYROID STIM HORMONE: CPT

## 2022-08-03 PROCEDURE — 93005 ELECTROCARDIOGRAM TRACING: CPT | Performed by: EMERGENCY MEDICINE

## 2022-08-03 PROCEDURE — 2580000003 HC RX 258: Performed by: EMERGENCY MEDICINE

## 2022-08-03 PROCEDURE — 85025 COMPLETE CBC W/AUTO DIFF WBC: CPT

## 2022-08-03 PROCEDURE — 87040 BLOOD CULTURE FOR BACTERIA: CPT

## 2022-08-03 PROCEDURE — 84439 ASSAY OF FREE THYROXINE: CPT

## 2022-08-03 PROCEDURE — 96367 TX/PROPH/DG ADDL SEQ IV INF: CPT

## 2022-08-03 PROCEDURE — 6360000002 HC RX W HCPCS: Performed by: NURSE PRACTITIONER

## 2022-08-03 PROCEDURE — 2500000003 HC RX 250 WO HCPCS: Performed by: NURSE PRACTITIONER

## 2022-08-03 PROCEDURE — 80053 COMPREHEN METABOLIC PANEL: CPT

## 2022-08-03 PROCEDURE — 83880 ASSAY OF NATRIURETIC PEPTIDE: CPT

## 2022-08-03 PROCEDURE — 71045 X-RAY EXAM CHEST 1 VIEW: CPT

## 2022-08-03 PROCEDURE — 82248 BILIRUBIN DIRECT: CPT

## 2022-08-03 RX ORDER — 0.9 % SODIUM CHLORIDE 0.9 %
30 INTRAVENOUS SOLUTION INTRAVENOUS ONCE
Status: COMPLETED | OUTPATIENT
Start: 2022-08-03 | End: 2022-08-03

## 2022-08-03 RX ORDER — POLYETHYLENE GLYCOL 3350 17 G/17G
17 POWDER, FOR SOLUTION ORAL DAILY PRN
Status: DISCONTINUED | OUTPATIENT
Start: 2022-08-03 | End: 2022-08-03 | Stop reason: CLARIF

## 2022-08-03 RX ORDER — SENNA AND DOCUSATE SODIUM 50; 8.6 MG/1; MG/1
2 TABLET, FILM COATED ORAL DAILY
Status: DISCONTINUED | OUTPATIENT
Start: 2022-08-03 | End: 2022-08-08 | Stop reason: HOSPADM

## 2022-08-03 RX ORDER — METRONIDAZOLE 500 MG/100ML
500 INJECTION, SOLUTION INTRAVENOUS EVERY 8 HOURS
Status: DISCONTINUED | OUTPATIENT
Start: 2022-08-03 | End: 2022-08-03

## 2022-08-03 RX ORDER — METRONIDAZOLE 500 MG/100ML
500 INJECTION, SOLUTION INTRAVENOUS EVERY 8 HOURS
Status: DISCONTINUED | OUTPATIENT
Start: 2022-08-03 | End: 2022-08-03 | Stop reason: SDUPTHER

## 2022-08-03 RX ORDER — SODIUM CHLORIDE 0.9 % (FLUSH) 0.9 %
5-40 SYRINGE (ML) INJECTION EVERY 12 HOURS SCHEDULED
Status: DISCONTINUED | OUTPATIENT
Start: 2022-08-03 | End: 2022-08-08 | Stop reason: HOSPADM

## 2022-08-03 RX ORDER — POLYETHYLENE GLYCOL 3350 17 G/17G
17 POWDER, FOR SOLUTION ORAL DAILY PRN
Status: DISCONTINUED | OUTPATIENT
Start: 2022-08-03 | End: 2022-08-08 | Stop reason: HOSPADM

## 2022-08-03 RX ORDER — ONDANSETRON 4 MG/1
4 TABLET, ORALLY DISINTEGRATING ORAL EVERY 6 HOURS PRN
Status: DISCONTINUED | OUTPATIENT
Start: 2022-08-03 | End: 2022-08-03 | Stop reason: SDUPTHER

## 2022-08-03 RX ORDER — SODIUM CHLORIDE 9 MG/ML
INJECTION, SOLUTION INTRAVENOUS PRN
Status: DISCONTINUED | OUTPATIENT
Start: 2022-08-03 | End: 2022-08-08 | Stop reason: HOSPADM

## 2022-08-03 RX ORDER — SODIUM CHLORIDE 0.9 % (FLUSH) 0.9 %
5-40 SYRINGE (ML) INJECTION PRN
Status: DISCONTINUED | OUTPATIENT
Start: 2022-08-03 | End: 2022-08-08 | Stop reason: HOSPADM

## 2022-08-03 RX ORDER — DEXAMETHASONE 4 MG/1
4 TABLET ORAL DAILY
Status: COMPLETED | OUTPATIENT
Start: 2022-08-05 | End: 2022-08-07

## 2022-08-03 RX ORDER — DEXAMETHASONE 4 MG/1
4 TABLET ORAL 2 TIMES DAILY
Status: COMPLETED | OUTPATIENT
Start: 2022-08-03 | End: 2022-08-04

## 2022-08-03 RX ORDER — ACETAMINOPHEN 650 MG/1
650 SUPPOSITORY RECTAL EVERY 6 HOURS PRN
Status: DISCONTINUED | OUTPATIENT
Start: 2022-08-03 | End: 2022-08-08 | Stop reason: HOSPADM

## 2022-08-03 RX ORDER — ONDANSETRON 2 MG/ML
4 INJECTION INTRAMUSCULAR; INTRAVENOUS EVERY 6 HOURS PRN
Status: DISCONTINUED | OUTPATIENT
Start: 2022-08-03 | End: 2022-08-08 | Stop reason: HOSPADM

## 2022-08-03 RX ORDER — ENOXAPARIN SODIUM 100 MG/ML
40 INJECTION SUBCUTANEOUS DAILY
Status: DISCONTINUED | OUTPATIENT
Start: 2022-08-03 | End: 2022-08-03 | Stop reason: SDUPTHER

## 2022-08-03 RX ORDER — 0.9 % SODIUM CHLORIDE 0.9 %
1000 INTRAVENOUS SOLUTION INTRAVENOUS ONCE
Status: DISCONTINUED | OUTPATIENT
Start: 2022-08-03 | End: 2022-08-03

## 2022-08-03 RX ORDER — DEXAMETHASONE 2 MG/1
2 TABLET ORAL DAILY
Status: DISCONTINUED | OUTPATIENT
Start: 2022-08-08 | End: 2022-08-08 | Stop reason: HOSPADM

## 2022-08-03 RX ORDER — ACETAMINOPHEN 325 MG/1
650 TABLET ORAL EVERY 6 HOURS PRN
Status: DISCONTINUED | OUTPATIENT
Start: 2022-08-03 | End: 2022-08-08 | Stop reason: HOSPADM

## 2022-08-03 RX ORDER — SODIUM CHLORIDE 9 MG/ML
INJECTION, SOLUTION INTRAVENOUS CONTINUOUS
Status: DISCONTINUED | OUTPATIENT
Start: 2022-08-03 | End: 2022-08-05

## 2022-08-03 RX ORDER — ENOXAPARIN SODIUM 100 MG/ML
40 INJECTION SUBCUTANEOUS EVERY EVENING
Status: DISCONTINUED | OUTPATIENT
Start: 2022-08-03 | End: 2022-08-08 | Stop reason: HOSPADM

## 2022-08-03 RX ORDER — POLYETHYLENE GLYCOL 3350 17 G/17G
17 POWDER, FOR SOLUTION ORAL DAILY PRN
Status: DISCONTINUED | OUTPATIENT
Start: 2022-08-03 | End: 2022-08-03 | Stop reason: SDUPTHER

## 2022-08-03 RX ORDER — ONDANSETRON 2 MG/ML
4 INJECTION INTRAMUSCULAR; INTRAVENOUS EVERY 6 HOURS PRN
Status: DISCONTINUED | OUTPATIENT
Start: 2022-08-03 | End: 2022-08-03 | Stop reason: SDUPTHER

## 2022-08-03 RX ORDER — ONDANSETRON 4 MG/1
4 TABLET, ORALLY DISINTEGRATING ORAL EVERY 8 HOURS PRN
Status: DISCONTINUED | OUTPATIENT
Start: 2022-08-03 | End: 2022-08-08 | Stop reason: HOSPADM

## 2022-08-03 RX ORDER — PANTOPRAZOLE SODIUM 40 MG/1
40 TABLET, DELAYED RELEASE ORAL DAILY
Status: DISCONTINUED | OUTPATIENT
Start: 2022-08-04 | End: 2022-08-08 | Stop reason: HOSPADM

## 2022-08-03 RX ORDER — LEVETIRACETAM 500 MG/1
500 TABLET ORAL 2 TIMES DAILY
Status: DISCONTINUED | OUTPATIENT
Start: 2022-08-03 | End: 2022-08-08 | Stop reason: HOSPADM

## 2022-08-03 RX ADMIN — METRONIDAZOLE 500 MG: 500 INJECTION, SOLUTION INTRAVENOUS at 14:24

## 2022-08-03 RX ADMIN — SODIUM CHLORIDE: 9 INJECTION, SOLUTION INTRAVENOUS at 20:40

## 2022-08-03 RX ADMIN — CEFEPIME HYDROCHLORIDE 2000 MG: 2 INJECTION, POWDER, FOR SOLUTION INTRAVENOUS at 13:45

## 2022-08-03 RX ADMIN — SENNOSIDES AND DOCUSATE SODIUM 2 TABLET: 50; 8.6 TABLET ORAL at 21:32

## 2022-08-03 RX ADMIN — ACETAMINOPHEN 650 MG: 325 TABLET ORAL at 23:54

## 2022-08-03 RX ADMIN — SODIUM CHLORIDE 1779 ML: 9 INJECTION, SOLUTION INTRAVENOUS at 14:25

## 2022-08-03 RX ADMIN — DEXAMETHASONE 4 MG: 4 TABLET ORAL at 21:31

## 2022-08-03 RX ADMIN — LEVETIRACETAM 500 MG: 500 TABLET, FILM COATED ORAL at 21:32

## 2022-08-03 RX ADMIN — ENOXAPARIN SODIUM 40 MG: 100 INJECTION SUBCUTANEOUS at 21:32

## 2022-08-03 RX ADMIN — IOPAMIDOL 75 ML: 755 INJECTION, SOLUTION INTRAVENOUS at 17:40

## 2022-08-03 RX ADMIN — METRONIDAZOLE 500 MG: 500 INJECTION, SOLUTION INTRAVENOUS at 22:25

## 2022-08-03 ASSESSMENT — PAIN DESCRIPTION - ORIENTATION: ORIENTATION: ANTERIOR

## 2022-08-03 ASSESSMENT — ENCOUNTER SYMPTOMS
COUGH: 0
SHORTNESS OF BREATH: 0
NAUSEA: 0
ABDOMINAL DISTENTION: 0
EYE REDNESS: 0
VOMITING: 0
DIARRHEA: 0
ABDOMINAL PAIN: 0

## 2022-08-03 ASSESSMENT — PAIN SCALES - GENERAL: PAINLEVEL_OUTOF10: 5

## 2022-08-03 ASSESSMENT — PAIN DESCRIPTION - DESCRIPTORS: DESCRIPTORS: ACHING

## 2022-08-03 ASSESSMENT — PAIN DESCRIPTION - LOCATION: LOCATION: HEAD

## 2022-08-03 NOTE — H&P
V2.0  History and Physical      Name:  Stone Cross Senior /Age/Sex: 1950  (70 y.o. female)   MRN & CSN:  [id number] & [id number] Encounter Date/Time: 8/3/2022 7:40 PM EDT   Location:  [address] [place] [address]-A PCP: Aneta Cohen [address] [place] [address] Day: 1    Assessment and Plan:   Lottie Burkett is a 70 y.o. female with a pmh as noted below presents with fatigue    Severe sepsis in immunocompromised host    -Patient hemodynamically stable, no evidence of hypotension  Patient with leukocytosis, lactic acidosis, tachycardia meets criteria for severe sepsis  Sepsis fluid bolus given in ER then maintenance IVF  Chest x-ray with rounded, right basilar opacity concerning for a mass. Numerous bilateral nodular densities noted concerning for metastatic disease  CT abdomen pelvis with extensive metastatic disease throughout the liver, occupying majority of hepatic parenchyma. Mass within the right middle lobe. Blood cultures pending  Start Flagyl and cefepime . No abdominal tenderness    Trend CBC, procalcitonin pending   UA with reflex to culture      Infiltrating ductal carcinoma of the right breast   Breast cancer, metastatic, with metastasis to the brain and liver  Secondary malignant neoplasm of liver  Hyponatremia   History of breast cancer status postchemotherapy radiation as well as mastectomy, lumpectomy   Diagnosed with metastatic disease to the liver. Liver biopsy  preliminary showed poorly differentiated carcinomaThe positive staining for CK7 and LEON-3 is suggestive of a   breast primary   MRI of brain 2022:  Enhancing, partially cystic and solid mass within the right frontal lobe, worrisome for a brain metastasis given the history. Status post gamma knife therapy at [facility] initiated , continue dexamethasone taper as ordered.   Continue Keppra for antiseizure therapy as ordered   Elevated LFTs and alk phos secondary to metastatic liver disease   No evidence of abdominal, right upper quadrant tenderness or SBP   Continue supportive therapy,   ED provider discussed case with Dr. Vielka Duran via phone, poor overall prognosis   Start gentle maintenance IVF for hyponatremia, hepatitis, hemoconcentration on CBC consistent with poor p.o. intake and cancer patient with metastasis  Review medications for hepatotoxicity  - Follow transaminases, alkaline phosphatase levels  -PT/INR, albumin, ammonia levels   -Direct bilirubin level   Consult to oncology, Dr. Rayna Nicolas. ED provider discussed case with oncologist and to follow-up in a.m. overall prognosis is poor due to metastatic disease, patient is aware,    Hypertension   Hold home spironolactone, hydrochlorothiazide with fluids     Chronic Conditions: continue home medication as ordered      All testing  and results reviewed with patient . All questions answered. Patient and family voiced understanding    This patient was seen and examined in conjunction with Dr. Nupur Poole. He/She was agreeable with the plan and management as dictated above. Disposition:   Expected Disposition: Home  Estimated D/C: 3 days    Diet ADULT DIET; Regular   GI Prophylaxis  [x] PPI,  [] H2 Blocker,  [] Carafate,  [] Diet/Tube Feeds   DVT Prophylaxis [x] Lovenox, []  Heparin, [] SCDs, [] Ambulation,  [] NOAC   Code Status Full Code   Surrogate Decision Maker/ POA          History from:     patient, electronic medical record, patient's son    History of Present Illness:     Chief Complaint: Malignant neoplasm of breast (female) ([facility] [address]  Tex Wilkinson is a 70 y.o. female with pmh of malignant neoplasm of the left breast status postchemotherapy and radiation in 2014 who was recently diagnosed with metastatic disease to the liver and brain, started on gamma knife therapy at [facility] on Monday, 8 1 and presented the ER with complaints of fatigue, nausea, poor appearance during her outpatient appointment with Dr. Davina Tello for evaluation for port placement.   Patient denies abdominal pain. Denies nausea and vomiting. Endorses general overall weakness. States she is very hungry would like to have something to eat. Additional review of systems as noted below     Review of Systems: Need 10 Elements   Review of Systems   Constitutional:  Positive for activity change, appetite change and fatigue. Negative for diaphoresis. HENT:  Negative for congestion and postnasal drip. Eyes:  Negative for redness and visual disturbance. Respiratory:  Negative for cough and shortness of breath. Cardiovascular:  Negative for chest pain and palpitations. Gastrointestinal:  Negative for abdominal distention, abdominal pain, diarrhea, nausea and vomiting. Endocrine: Negative for cold intolerance and heat intolerance. Genitourinary:  Negative for difficulty urinating and dysuria. Musculoskeletal:  Negative for joint swelling and neck pain. Skin: Negative. Neurological:  Negative for dizziness and speech difficulty. Psychiatric/Behavioral:  Negative for agitation and confusion. Objective: Intake/Output Summary (Last 24 hours) at 8/3/2022 2032  Last data filed at 8/3/2022 1715  Gross per 24 hour   Intake --   Output 250 ml   Net -250 ml      Vitals:   Vitals:    08/03/22 1529 08/03/22 1559 08/03/22 1629 08/03/22 1945   BP: (!) 142/83 128/77 128/73 126/76   Pulse: 81 82 80 95   Resp: 18 24 16 16   Temp:    97.9 °F (36.6 °C)   TempSrc:    Oral   SpO2: 97% 96% 97% 97%   Weight:       Height:           Medications Prior to Admission     Prior to Admission medications    Medication Sig Start Date End Date Taking? Authorizing Provider   dexamethasone (DECADRON) 4 MG tablet Take 1 tablet by mouth in the morning and 1 tablet before bedtime. Patient taking differently: Take 4 mg by mouth in the morning and 4 mg before bedtime.  Steroid taper:  Continue taking Dexamethasone 4mg, 1-4mg tablet this evening, then 4mg 2 times a day for 3 days then 4mg daily for 3 days and then 2mg daily for 3 days and STOP! . 8/2/22  Yes Manuelito Farrell MD   levETIRAcetam (KEPPRA) 500 MG tablet Take 1 tablet by mouth in the morning and 1 tablet before bedtime. 8/2/22  Yes Patty Flor MD   spironolactone-hydroCHLOROthiazide (ALDACTAZIDE) 50-50 MG per tablet Take 1 tablet by mouth in the morning. 7/28/22  Yes Valentine Beltran,    pantoprazole (PROTONIX) 40 MG tablet Take 1 tablet by mouth in the morning. 7/25/22 8/24/22 Yes Patty Flor MD   polyethylene glycol [facility]) 17 GM/SCOOP powder Take 17 g by mouth daily as needed (constipation) 7/20/22 8/19/22 Yes Manuelito Farrell MD   sennosides-docusate sodium (SENOKOT-S) 8.6-50 MG tablet Take 2 tablets by mouth in the morning. 7/20/22 8/19/22 Yes Manuelito Farrell MD   Pyridoxine HCl (VITAMIN B-6) 100 MG tablet Take 100 mg by mouth daily   Yes Historical Provider, MD   vitamin B-12 (CYANOCOBALAMIN) 1000 MCG tablet Take 1,000 mcg by mouth daily   Yes Historical Provider, MD   vitamin B-1 (THIAMINE) 100 MG tablet Take 100 mg by mouth daily   Yes Historical Provider, MD   MULTIPLE VITAMIN PO Take  by mouth. Yes Historical Provider, MD   levETIRAcetam (KEPPRA) 500 MG tablet Take 1 tablet by mouth in the morning and 1 tablet before bedtime. Start THREE days prior to [facility]. Begin 7/29/2022. Patient not taking: No sig reported 7/29/22   Patty Flor MD       Physical Exam: Need 8 Elements   Physical Exam  Vitals and nursing note reviewed. Constitutional:       General: She is not in acute distress. Appearance: Normal appearance. HENT:      Head: Normocephalic. Nose: Nose normal.      Mouth/Throat:      Pharynx: Oropharynx is clear. Eyes:      Pupils: Pupils are equal, round, and reactive to light. Cardiovascular:      Rate and Rhythm: Normal rate and regular rhythm. Pulses: Normal pulses. Pulmonary:      Effort: Pulmonary effort is normal. No respiratory distress. Breath sounds: No wheezing or rhonchi.    Abdominal: Yes     Partners: Male       Medications:   Medications:    levETIRAcetam  500 mg Oral BID    [START ON 8/4/2022] pantoprazole  40 mg Oral Daily    sennosides-docusate sodium  2 tablet Oral Daily    [Held by provider] spironolactone-hydroCHLOROthiazide  1 tablet Oral Daily    sodium chloride flush  5-40 mL IntraVENous 2 times per day    enoxaparin  40 mg SubCUTAneous QPM    metroNIDAZOLE  500 mg IntraVENous Q8H    [START ON 8/4/2022] cefepime  2,000 mg IntraVENous Q12H    dexamethasone  4 mg Oral BID    [START ON 8/5/2022] dexamethasone  4 mg Oral Daily    [START ON 8/8/2022] dexamethasone  2 mg Oral Daily      Infusions:    sodium chloride      sodium chloride       PRN Meds: polyethylene glycol, 17 g, Daily PRN  sodium chloride flush, 5-40 mL, PRN  sodium chloride, , PRN  acetaminophen, 650 mg, Q6H PRN   Or  acetaminophen, 650 mg, Q6H PRN  ondansetron, 4 mg, Q8H PRN   Or  ondansetron, 4 mg, Q6H PRN      Labs      CBC:   Recent Labs     08/03/22  1135   WBC 18.1*   HGB 16.9*        BMP:    Recent Labs     08/01/22  0630 08/03/22  1135   * 130*   K 4.8 5.1   CL 89* 91*   CO2 24 23   BUN 32* 42*   CREATININE 0.8 0.6   GLUCOSE 94 104*     Hepatic:   Recent Labs     08/03/22  1135   *   *   BILITOT 17.6*   ALKPHOS 3,403*     Lipids:   Lab Results   Component Value Date/Time    CHOL 206 09/12/2014 11:16 AM    HDL 76 09/12/2014 11:16 AM    TRIG 96 09/12/2014 11:16 AM     Hemoglobin A1C: No results found for: LABA1C  TSH: No results found for: TSH  Troponin:   Lab Results   Component Value Date/Time    TROPONINT <0.010 08/03/2022 11:35 AM     Lactic Acid: No results for input(s): LACTA in the last 72 hours.   BNP:   Recent Labs     08/03/22  1135   PROBNP 299.3     UA:  Lab Results   Component Value Date/Time    NITRU NEGATIVE 08/03/2022 05:13 PM    COLORU ORANGE 08/03/2022 05:13 PM    WBCUA 2 08/03/2022 05:13 PM    RBCUA 1 08/03/2022 05:13 PM    MUCUS RARE 08/03/2022 05:13 PM TRICHOMONAS NONE SEEN 08/03/2022 05:13 PM    YEAST RARE 09/12/2014 11:17 AM    BACTERIA OCCASIONAL 08/03/2022 05:13 PM    CLARITYU CLEAR 08/03/2022 05:13 PM    SPECGRAV 1.020 08/03/2022 05:13 PM    LEUKOCYTESUR NEGATIVE 08/03/2022 05:13 PM    UROBILINOGEN 1.0 08/03/2022 05:13 PM    BILIRUBINUR LARGE 08/03/2022 05:13 PM    BLOODU MODERATE 08/03/2022 05:13 PM    KETUA NEGATIVE 08/03/2022 05:13 PM     Urine Cultures: No results found for: LABURIN  Blood Cultures: No results found for: BC  No results found for: BLOODCULT2  Organism: No results found for: ORG    Imaging/Diagnostics Last 24 Hours   CT ABDOMEN PELVIS W IV CONTRAST Additional Contrast? None    Result Date: 8/3/2022  EXAMINATION: CT OF THE ABDOMEN AND PELVIS WITH CONTRAST 8/3/2022 10:53 am TECHNIQUE: CT of the abdomen and pelvis was performed with the administration of intravenous contrast. Multiplanar reformatted images are provided for review. Automated exposure control, iterative reconstruction, and/or weight based adjustment of the mA/kV was utilized to reduce the radiation dose to as low as reasonably achievable. COMPARISON: None. HISTORY: ORDERING SYSTEM PROVIDED HISTORY: biliary obstruction. hx metastatic breast CA TECHNOLOGIST PROVIDED HISTORY: Reason for exam:->biliary obstruction. hx metastatic breast CA Additional Contrast?->None Decision Support Exception - unselect if not a suspected or confirmed emergency medical condition->Emergency Medical Condition (MA) Reason for Exam: biliary obstruction. hx metastatic breast CA FINDINGS: Lower Chest: A mass within the right middle lobe is partially imaged, measuring at least 4 cm. Innumerable noncalcified nodules are seen within the lower lungs. Example in the right lower lobe measures 9 mm (2, 7). Base of the heart is unremarkable. Left breast prosthesis partially imaged. Visualized extra thoracic soft tissues otherwise unremarkable. . Organs: Innumerable hypodense lesions are seen throughout the liver. Largest conglomerate measures roughly 7 cm (2, 37). Portal vein remains patent. The gallbladder is nondistended. Liver unremarkable. Indeterminate left adrenal nodule measures 1.1 cm (2, 36). Right adrenal gland unremarkable. Pancreas unremarkable. No acute or suspicious renal abnormalities are identified. GI/Bowel: Mild diverticulosis of the large bowel is present without CT evidence of diverticulitis. The large bowel is otherwise unremarkable. The appendix is not well visualized. No asymmetric pericecal inflammation is seen to suggest acute appendicitis. Distal esophagus unremarkable. Stomach unremarkable. Duodenal sweep and the remainder of the small bowel are unremarkable. Pelvis: Fibroid uterus noted. Small amount of free pelvic fluid noted. Urinary bladder unremarkable. Peritoneum/Retroperitoneum: Abdominal aorta normal in caliber. Superior mesenteric artery is enhancing no pathologically large retroperitoneal lymph nodes are seen. There is a small amount of free intra-abdominal fluid, mainly in the perihepatic space and within the pelvis. Bones/Soft Tissues: No acute or suspicious bony abnormalities are identified. Extensive metastatic disease throughout the liver, occupying the majority of the hepatic parenchyma. Mass within the right middle lobe, incompletely evaluated, possibly the primary neoplasm. Numerous nodules are seen within the visualized lungs, compatible with metastatic disease. XR CHEST PORTABLE    Result Date: 8/3/2022  EXAMINATION: ONE XRAY VIEW OF THE CHEST 8/3/2022 12:23 pm COMPARISON: August 27, 2015 HISTORY: ORDERING SYSTEM PROVIDED HISTORY: Shortness of breath TECHNOLOGIST PROVIDED HISTORY: Reason for exam:->dyspnea FINDINGS: Cardiomediastinal silhouette within normal limits. Right basilar rounded opacity concerning for a mass. Numerous bilateral subtle rounded densities present bilaterally. No definite effusion. No pneumothorax or subdiaphragmatic free air.   No acute osseous abnormality identified. Rounded right basilar opacity concerning for a mass. Numerous bilateral nodular densities noted bilaterally concerning for metastatic disease. CT of the chest with contrast recommended for further evaluation. MRI BRAIN W WO CONTRAST    Result Date: 8/1/2022  EXAM: MRI BRAIN W WO CONTRAST INDICATION: Metastasis, gamma knife protocol COMPARISON: None TECHNIQUE: Multiplanar, multisequence MR imaging of the head obtained without and with IV. IV contrast: 15 mL IV ProHance. FINDINGS: There is a heterogeneously enhancing complex solid cystic mass in the right frontal lobe measuring approximately 2.0 x 1.5 x 2.2 cm. There is mild to moderate surrounding vasogenic edema. It abuts the overlying dura. 3 mm enhancing nodule in right superior vermis (image 59 of series 7). 1. 2.0 x 1.5 x 2.2 cm and is enhancing complex solid cystic mass in the right frontal lobe with mild to moderate surrounding vasogenic edema. 2. 3 mm enhancing nodule in right superior vermis. Electronically signed by ASHLEY Grimaldo CNP on 8/3/2022 at 8:32 PM          This dictation was created with voice recognition software. While attempts have been made to review the dictation as it is transcribed, on occasion the spoken word can be misinterpreted by the technology leading to omissions or inappropriate words, phrases or sentences.      Electronically signed by ASHLEY Grimaldo CNP on 8/3/2022 at 8:32 PM

## 2022-08-03 NOTE — ED PROVIDER NOTES
Emergency Department Encounter  Location: [facility] 4N    Patient: Rossy Sosa  MRN: [id number]  : 1950  Date of evaluation: 8/3/2022  ED Provider: Dixon Sidhu DO    Chief Complaint:    Fatigue (Sent by pcp for possible dehydration)    [place]:  Burton Wilkinson is a 70 y.o. female that presents to the emergency department with concern for generalized malaise. Patient has a pertinent history of metastatic breast cancer. This is her third recurrence, most recently discovered in  of this year after a fall. She not yet started chemotherapy. Per notes, plan is for palliative chemotherapy with oncology. Did undergo gamma knife radiation for 2 metastatic lesions in her brain on 22. Since then, she has had a very poor appetite with limited oral intake. Brother describes that she is very fatigued, unable to walk due to dyspnea. Patient denies fever or chills. Describes decreased urination without dysuria or urgency. No chest pain, cough or congestion. Patient follows with Dr. Nathaly Quinn and Dr. John Murray    ROS:  At least 10 systems reviewed and are acutely negative unless otherwise noted in the HPI.     Past Medical History:   Diagnosis Date    Breast cancer ([address]     Cancer ([facility] [address]     Bilateral Breast    Chemotherapy-induced neuropathy (HCC)     fingers and toes    Ductal carcinoma in situ (DCIS) of right breast     History of external beam radiation therapy     [facility]    History of external beam radiation therapy     Left Breast/  at [facility]    Lymphedema of right upper extremity     Pedal edema      Past Surgical History:   Procedure Laterality Date    BREAST BIOPSY Left     BREAST BIOPSY Right     BREAST LUMPECTOMY Left     BREAST LUMPECTOMY Right     Right Lumpectomy with [place] Node Bx    BREAST SURGERY Left     Mastectomy with [place] Bx and Reconstruction    IR BIOPSY LIVER PERCUTANEOUS  2022    IR BIOPSY LIVER PERCUTANEOUS 7/18/2022 [facility] PROCEDURES    TUNNELED VENOUS PORT PLACEMENT       Family History   Problem Relation Age of Onset    Heart Failure Mother     High Blood Pressure Mother     Kidney Disease Father     Other Brother         homicide    Other Brother         sepsis    Breast Cancer Neg Hx      Social History     Socioeconomic History    Marital status:       Spouse name: Not on file    Number of children: Not on file    Years of education: Not on file    Highest education level: Not on file   Occupational History    Not on file   Tobacco Use    Smoking status: Never    Smokeless tobacco: Never   Vaping Use    Vaping Use: Never used   Substance and Sexual Activity    Alcohol use: No    Drug use: No    Sexual activity: Yes     Partners: Male   Other Topics Concern    Not on file   Social History Narrative    Not on file     Social Determinants of Health     Financial Resource Strain: Not on file   Food Insecurity: Not on file   Transportation Needs: Not on file   Physical Activity: Not on file   Stress: Not on file   Social Connections: Not on file   Intimate Partner Violence: Not on file   Housing Stability: Not on file     Current Facility-Administered Medications   Medication Dose Route Frequency Provider Last Rate Last Admin    levETIRAcetam (KEPPRA) tablet 500 mg  500 mg Oral BID ASHLEY Vanessa CNP   500 mg at 08/04/22 0940    pantoprazole (PROTONIX) tablet 40 mg  40 mg Oral Daily ASHLEY Thompson CNP   40 mg at 08/04/22 0941    sennosides-docusate sodium (SENOKOT-S) 8.6-50 MG tablet 2 tablet  2 tablet Oral Daily ASHLEY Thompson CNP   2 tablet at 08/04/22 0940    [Held by provider] spironolactone-hydroCHLOROthiazide (ALDACTAZIDE) 50-50 MG per tablet 1 tablet  1 tablet Oral Daily ASHLEY Thompson CNP        sodium chloride flush 0.9 % injection 5-40 mL  5-40 mL IntraVENous 2 times per day ASHLEY Vanessa CNP   10 mL at 08/04/22 9425    sodium chloride flush 0.9 % injection 5-40 mL  5-40 mL IntraVENous PRN Ernestina I ASHLEY Voss CNP        0.9 % sodium chloride infusion   IntraVENous PRN ASHLEY Thompson CNP        acetaminophen (TYLENOL) tablet 650 mg  650 mg Oral Q6H PRN ASHLEY Carrero CNP   650 mg at 08/03/22 2354    Or    acetaminophen (TYLENOL) suppository 650 mg  650 mg Rectal Q6H PRN Ernestina I ASHLEY Voss CNP        enoxaparin (LOVENOX) injection 40 mg  40 mg SubCUTAneous QPM Ernestina I BernicelettyASHLEY Ball CNP   40 mg at 08/03/22 2132    ondansetron (ZOFRAN-ODT) disintegrating tablet 4 mg  4 mg Oral Q8H PRN Ernestina I ASHLEY Voss CNP        Or    ondansetron (ZOFRAN) injection 4 mg  4 mg IntraVENous Q6H PRN Ernestina I ASHLEY Voss CNP        cefepime (MAXIPIME) 2000 mg IVPB minibag  2,000 mg IntraVENous Q12H ASHLEY Thompson CNP   Stopped at 08/04/22 0330    0.9 % sodium chloride infusion   IntraVENous Continuous ASHLEY Carrero  mL/hr at 08/03/22 2040 New Bag at 08/03/22 2040    dexamethasone (DECADRON) tablet 4 mg  4 mg Oral BID ASHLEY Carrero CNP   4 mg at 08/04/22 0941    [START ON 8/5/2022] dexamethasone (DECADRON) tablet 4 mg  4 mg Oral Daily ASHLEY Thompson CNP        [START ON 8/8/2022] dexamethasone (DECADRON) tablet 2 mg  2 mg Oral Daily ASHLEY Thompson CNP        polyethylene glycol (GLYCOLAX) packet 17 g  17 g Per J Tube Daily PRN Ernestina I ASHLEY Voss CNP        vancomycin (VANCOCIN) 1,250 mg in dextrose 5 % 250 mL IVPB ([id number])  1,250 mg IntraVENous Q12H Gemini Rascon MD         Allergies   Allergen Reactions    Penicillins     Tape [Adhesive Tape] Rash       Nursing Notes Reviewed    Physical Exam:  ED Triage Vitals   Enc Vitals Group      BP 08/03/22 1120 131/86      Heart Rate 08/03/22 1120 99      Resp 08/03/22 1120 16      Temp 08/03/22 1120 97.6 °F (36.4 °C)      Temp src --       SpO2 08/03/22 1120 96 %      Weight 08/03/22 1138 209 lb (94.8 kg)      Height 08/03/22 1138 5' 6\" (1.676 m)      Head Circumference --       Peak Flow --       Pain Score --       Pain Loc --       Pain Edu? --       Excl. in [address]? --      GENERAL APPEARANCE: Lying quietly with eyes closed. Appears mildly ill and generally fatigued. HEAD: Normocephalic. Atraumatic. EYES: EOM's grossly intact. Sclera anicteric. ENT: Tolerates saliva. No trismus. NECK: Supple. Trachea midline. CARDIO: RRR. Radial pulse 2+. LUNGS: Respirations unlabored. CTAB. ABDOMEN: Soft. Non-distended. Non-tender. EXTREMITIES: No acute deformities. SKIN: Warm and dry. NEUROLOGICAL: Patient is alert and able to answer questions of orientation appropriately. Able to follow commands. No gross facial drooping. Symmetric strength and sensation in extremities x4. PSYCHIATRIC: Normal mood.      Labs:  Results for orders placed or performed during the hospital encounter of 08/03/22   CBC with Auto Differential   Result Value Ref Range    WBC 18.1 (H) 4.0 - 10.5 K/CU MM    RBC 5.69 (H) 4.2 - 5.4 M/CU MM    Hemoglobin 16.9 (H) 12.5 - 16.0 GM/DL    Hematocrit 47.6 (H) 37 - 47 %    MCV 83.7 78 - 100 FL    MCH 29.7 27 - 31 PG    MCHC 35.5 32.0 - 36.0 %    RDW 24.8 (H) 11.7 - 14.9 %    Platelets 172 358 - 364 K/CU MM    MPV 10.7 7.5 - 11.1 FL    Differential Type AUTOMATED DIFFERENTIAL     Segs Relative 83.7 (H) 36 - 66 %    Lymphocytes % 5.0 (L) 24 - 44 %    Monocytes % 8.6 (H) 0 - 4 %    Eosinophils % 0.0 0 - 3 %    Basophils % 0.2 0 - 1 %    Segs Absolute 15.1 K/CU MM    Lymphocytes Absolute 0.9 K/CU MM    Monocytes Absolute 1.6 K/CU MM    Eosinophils Absolute 0.0 K/CU MM    Basophils Absolute 0.0 K/CU MM    Nucleated RBC % 0.5 %    Total Nucleated RBC 0.1 K/CU MM    Total Immature Neutrophil 0.45 K/CU MM    Immature Neutrophil % 2.5 (H) 0 - 0.43 %   Comprehensive Metabolic Panel w/ Reflex to MG   Result Value Ref Range    Sodium 130 (L) 135 - 145 MMOL/L    Potassium 5.1 3.5 - 5.1 MMOL/L    Chloride 91 (L) 99 - 110 mMol/L    CO2 23 21 - 32 MMOL/L    BUN 42 (H) 6 - 23 MG/DL    Creatinine 0.6 0.6 - 1.1 MG/DL    Glucose 104 (H) 70 - 99 MG/DL    Calcium 9.8 8.3 - 10.6 MG/DL    Albumin 2.3 (L) 3.4 - 5.0 GM/DL    Total Protein 7.3 6.4 - 8.2 GM/DL    Total Bilirubin 17.6 (H) 0.0 - 1.0 MG/DL     (H) 10 - 40 U/L     (H) 15 - 37 IU/L    Alkaline Phosphatase 3,403 (H) 40 - 128 IU/L    GFR Non-African American >60 >60 mL/min/1.73m2    GFR African American >60 >60 mL/min/1.73m2    Anion Gap 16 4 - 16   Troponin   Result Value Ref Range    Troponin T <0.010 <0.01 NG/ML   Brain Natriuretic Peptide   Result Value Ref Range    Pro-.3 <300 PG/ML   Lactic Acid   Result Value Ref Range    Lactate 4.7 (HH) 0.4 - 2.0 mMOL/L   Urinalysis   Result Value Ref Range    Color, UA ORANGE (A) YELLOW    Clarity, UA CLEAR CLEAR    Glucose, Urine NEGATIVE NEGATIVE MG/DL    Bilirubin Urine LARGE (A) NEGATIVE MG/DL    Ketones, Urine NEGATIVE NEGATIVE MG/DL    Specific Gravity, UA 1.020 1.001 - 1.035    Blood, Urine MODERATE (A) NEGATIVE    pH, Urine 6.0 5.0 - 8.0    Protein, UA NEGATIVE NEGATIVE MG/DL    Urobilinogen, Urine 1.0 0.2 - 1.0 MG/DL    Nitrite Urine, Quantitative NEGATIVE NEGATIVE    Leukocyte Esterase, Urine NEGATIVE NEGATIVE    RBC, UA 1 0 - 6 /HPF    WBC, UA 2 0 - 5 /HPF    Bacteria, UA OCCASIONAL (A) NEGATIVE /HPF    Squam Epithel, UA <1 /HPF    Mucus, UA RARE (A) NEGATIVE HPF    Trichomonas, UA NONE SEEN NONE SEEN /HPF    non squam epi cells <1 /HPF    Hyaline Casts, UA 2 /LPF    RBC Casts, UA 3 /LPF    Granular Casts, UA 4 /LPF   Lactic Acid   Result Value Ref Range    Lactate 3.8 (HH) 0.4 - 2.0 mMOL/L   Bilirubin, Direct   Result Value Ref Range    Bilirubin, Direct 13.1 (H) 0.0 - 0.3 MG/DL   TSH without Reflex   Result Value Ref Range    TSH, High Sensitivity 0.513 0.270 - 4.20 uIu/ml   T4, free   Result Value Ref Range    T4 Free 0.82 (L) 0.9 - 1.8 NG/DL   Ammonia   Result Value Ref Range    Ammonia 75 (H) 11 - 51 UMOL/L   Lactic Acid   Result Value Ref Range    Lactate 3.8 (HH) 0.4 - 2.0 mMOL/L   CBC with Auto Differential   Result Value Ref Range    WBC 20.0 (H) 4.0 - 10.5 K/CU MM    RBC 5.28 4.2 - 5.4 M/CU MM    Hemoglobin 15.9 12.5 - 16.0 GM/DL    Hematocrit 45.2 37 - 47 %    MCV 85.6 78 - 100 FL    MCH 30.1 27 - 31 PG    MCHC 35.2 32.0 - 36.0 %    RDW 25.7 (H) 11.7 - 14.9 %    Platelets 058 (L) 559 - 440 K/CU MM    MPV 10.0 7.5 - 11.1 FL   EKG 12 Lead   Result Value Ref Range    Ventricular Rate 91 BPM    Atrial Rate 91 BPM    P-R Interval 148 ms    QRS Duration 76 ms    Q-T Interval 358 ms    QTc Calculation (Bazett) 440 ms    P Axis 70 degrees    R Axis 29 degrees    T Axis 60 degrees    Diagnosis       Normal sinus rhythm  Cannot rule out Anterior infarct (cited on or before 29-JUL-2022)  Abnormal ECG  When compared with ECG of 29-JUL-2022 14:13,  No significant change was found  Confirmed by [facility] Angely JOSEPH ([id number]) on 8/3/2022 9:27:10 PM         EKG (if obtained): (All EKG's are interpreted by myself in the absence of a cardiologist)  Sinus rhythm at 91. No ST elevation or depression. No ectopy. No acute change from prior tracing. Radiographs (if obtained):  [] The following radiograph was interpreted by myself in the absence of a radiologist:  [x] Radiologist's Report reviewed at time of ED visit:  CT ABDOMEN PELVIS W IV CONTRAST Additional Contrast? None    Result Date: 8/3/2022  EXAMINATION: CT OF THE ABDOMEN AND PELVIS WITH CONTRAST 8/3/2022 10:53 am TECHNIQUE: CT of the abdomen and pelvis was performed with the administration of intravenous contrast. Multiplanar reformatted images are provided for review. Automated exposure control, iterative reconstruction, and/or weight based adjustment of the mA/kV was utilized to reduce the radiation dose to as low as reasonably achievable. COMPARISON: None.  HISTORY: ORDERING SYSTEM PROVIDED HISTORY: biliary obstruction. hx metastatic breast CA TECHNOLOGIST PROVIDED HISTORY: Reason for exam:->biliary obstruction. hx metastatic breast CA Additional Contrast?->None Decision Support Exception - unselect if not a suspected or confirmed emergency medical condition->Emergency Medical Condition (MA) Reason for Exam: biliary obstruction. hx metastatic breast CA FINDINGS: Lower Chest: A mass within the right middle lobe is partially imaged, measuring at least 4 cm. Innumerable noncalcified nodules are seen within the lower lungs. Example in the right lower lobe measures 9 mm (2, 7). Base of the heart is unremarkable. Left breast prosthesis partially imaged. Visualized extra thoracic soft tissues otherwise unremarkable. . Organs: Innumerable hypodense lesions are seen throughout the liver. Largest conglomerate measures roughly 7 cm (2, 37). Portal vein remains patent. The gallbladder is nondistended. Liver unremarkable. Indeterminate left adrenal nodule measures 1.1 cm (2, 36). Right adrenal gland unremarkable. Pancreas unremarkable. No acute or suspicious renal abnormalities are identified. GI/Bowel: Mild diverticulosis of the large bowel is present without CT evidence of diverticulitis. The large bowel is otherwise unremarkable. The appendix is not well visualized. No asymmetric pericecal inflammation is seen to suggest acute appendicitis. Distal esophagus unremarkable. Stomach unremarkable. Duodenal sweep and the remainder of the small bowel are unremarkable. Pelvis: Fibroid uterus noted. Small amount of free pelvic fluid noted. Urinary bladder unremarkable. Peritoneum/Retroperitoneum: Abdominal aorta normal in caliber. Superior mesenteric artery is enhancing no pathologically large retroperitoneal lymph nodes are seen. There is a small amount of free intra-abdominal fluid, mainly in the perihepatic space and within the pelvis.  Bones/Soft Tissues: No acute or suspicious bony abnormalities are identified. Extensive metastatic disease throughout the liver, occupying the majority of the hepatic parenchyma. Mass within the right middle lobe, incompletely evaluated, possibly the primary neoplasm. Numerous nodules are seen within the visualized lungs, compatible with metastatic disease. XR CHEST PORTABLE    Result Date: 8/3/2022  EXAMINATION: ONE XRAY VIEW OF THE CHEST 8/3/2022 12:23 pm COMPARISON: August 27, 2015 HISTORY: ORDERING SYSTEM PROVIDED HISTORY: Shortness of breath TECHNOLOGIST PROVIDED HISTORY: Reason for exam:->dyspnea FINDINGS: Cardiomediastinal silhouette within normal limits. Right basilar rounded opacity concerning for a mass. Numerous bilateral subtle rounded densities present bilaterally. No definite effusion. No pneumothorax or subdiaphragmatic free air. No acute osseous abnormality identified. Rounded right basilar opacity concerning for a mass. Numerous bilateral nodular densities noted bilaterally concerning for metastatic disease. CT of the chest with contrast recommended for further evaluation. IR BIOPSY LIVER PERCUTANEOUS    Result Date: 7/18/2022  EXAMINATION: US liver biopsy  7/18/2022 1:36 pm TECHNIQUE: After informed consent patient was brought to specials suite and placed position on the table. The abdomen was prepped and draped in sterile fashion. Ultrasound was used to localize the right  lobe of the liver in the subcostal region. 1% lidocaine was infiltrated for local anesthesia and under real-time ultrasound guidance to the liver capsule. .  A small skin nick was made. An 18-gauge coaxial needle was advanced under ultrasound guidance using the needle guide into a hyperechoic mass within the right lobe of the liver inferiorly. 3 18-gauge core biopsies were obtained and submitted to pathology. Gel-Foam slurry was used to embolize the tract at the end of the procedure to prevent bleeding.  .  The cytopathologist Dr. Selwyn Ryder deemed the specimens to be adequate. 11 sonographic images were obtained procedure. Postbiopsy scanning showed no perihepatic hematoma. The patient was returned to holding in stable condition. The patient received 1.0 mg of Versed and 50 mcg fentanyl intravenously for sedation pain control. Sedation was provided by Dalila Purvis RN. Sedation time: 15 minutes. Moderate sedation was monitored under the physician's direction. The patient's vital signs were monitored throughout the procedure and recorded in the patient's medical record by the nurse. COMPARISON: CT of the abdomen pelvis IV contrast performed at [address]: Malignant neoplasm of nipple of left breast in female, unspecified estrogen receptor status ([facility] [address]     Successful ultrasound-guided liver biopsy obtaining 3 18-gauge cores. MRI BRAIN W WO CONTRAST    Result Date: 8/1/2022  EXAM: MRI BRAIN W WO CONTRAST INDICATION: Metastasis, gamma knife protocol COMPARISON: None TECHNIQUE: Multiplanar, multisequence MR imaging of the head obtained without and with IV. IV contrast: 15 mL IV ProHance. FINDINGS: There is a heterogeneously enhancing complex solid cystic mass in the right frontal lobe measuring approximately 2.0 x 1.5 x 2.2 cm. There is mild to moderate surrounding vasogenic edema. It abuts the overlying dura. 3 mm enhancing nodule in right superior vermis (image 59 of series 7). 1. 2.0 x 1.5 x 2.2 cm and is enhancing complex solid cystic mass in the right frontal lobe with mild to moderate surrounding vasogenic edema. 2. 3 mm enhancing nodule in right superior vermis.      MRI BRAIN W WO CONTRAST    Result Date: 7/19/2022  EXAMINATION: MRI OF THE BRAIN WITHOUT AND WITH CONTRAST  7/19/2022 9:05 am TECHNIQUE: Multiplanar multisequence MRI of the head/brain was performed without and with the administration of intravenous contrast. COMPARISON: CT head from 06/30/2022 HISTORY: ORDERING SYSTEM PROVIDED HISTORY: Malignant neoplasm of left breast in female, estrogen receptor negative, unspecified site of breast [facility]) TECHNOLOGIST PROVIDED HISTORY: STAT Creatinine as needed:->Yes Reason for Exam: Malignant neoplasm of left breast in female, estrogen receptor negative, unspecified site of breast ([address] 10 mL Multihance, GFR 50. Best images possible, patient had difficulty holding still for full MRI, sequences repeated, patient coached to limit motion, extra padding used to combat motion. Relevant Medical/Surgical History: none FINDINGS: INTRACRANIAL STRUCTURES/VENTRICLES:  There is no acute infarct. There is enhancing, partially cystic and solid appearing mass within the right frontal lobe measuring approximately 2.1 x 1.1 x 2.6 cm in diameter, with mild associated susceptibility artifact and surrounding vasogenic edema. No bleed or shift is identified. No other brain lesions are seen. ORBITS: The visualized portion of the orbits demonstrate no acute abnormality. SINUSES: The visualized paranasal sinuses and mastoid air cells demonstrate no acute abnormality. BONES/SOFT TISSUES: The bone marrow signal intensity appears normal. The soft tissues demonstrate no acute abnormality. Enhancing, partially cystic and solid mass within the right frontal lobe, worrisome for a brain metastasis given the history. The findings were sent to the Radiology Results [address] at 11:30 pm on 7/19/2022 to be communicated to a licensed caregiver. RECOMMENDATIONS: Unavailable        ED Course and MDM:  Patient has been hydrated through the ED course with 30 ml/kg. Remains hemodynamically stable although appears fatigued. Initial labs are concerning for sepsis with lactic acidosis, leukocytosis of 18. Suspect biliary origin given new hyperbilirubinemia and abnormal LFT's. Patient is covered with cefepime and flagyl. Labs are notable for elevated BUN to Cr ratio - likely secondary to inadequate oral intake.     Imaging obtained to evaluate for possibile biliary obstruction. Care endorsed to Dr. Milena Lazar final results. Anticipate admission. I spent at least 49 minutes of critical care time with this patient. This does not include time spent on separately reported billable procedures. Critical care time provided for sepsis that required close evaluation and/or intervention with concern for patient decompensation. SEP-1 CORE MEASURE DATA      Sepsis Criteria   Severe Sepsis Criteria   Septic Shock Criteria     Must be confirmed or suspected to move forward with diagnosis of sepsis. Must meet 2:    [] Temperature > 100.9 F (38.3 C)        or < 96.8 F (36 C)  [x] HR > 90  [] RR > 20  [x] WBC > 12 or < 4 or 10% bands      AND:      [x] Infection Confirmed or        Suspected. Must meet 1:    [x] Lactate > 2       or   [x] Signs of Organ Dysfunction:    - SBP < 90 or MAP < 65  - Altered mental status  - Creatinine > 2 or increased from      baseline  - Urine Output < 0.5 ml/kg/hr  - Bilirubin > 2  - INR > 1.5 (not anticoagulated)  - Platelets < 771,629  - Acute Respiratory Failure as     evidenced by new need for NIPPV     or mechanical ventilation      [] No criteria met for Severe Sepsis. Must meet 1:    [x] Lactate > 4        or   [] SBP < 90 or MAP < 65 for at        least two readings in the first        hour after fluid bolus        administration      [] Vasopressors initiated (if hypotension persists after fluid resuscitation)        [] No criteria met for Septic Shock. Patient Vitals for the past 6 hrs:   BP Temp Pulse Resp SpO2   08/04/22 0800 130/70 97.6 °F (36.4 °C) 77 20 96 %      Recent Labs     08/03/22  1135 08/04/22  1013   WBC 18.1* 20.0*   CREATININE 0.6  --    BILITOT 17.6*  --     117*        Time Septic Shock Identified: 1307    Fluid Resuscitation Rational: at least 30mL/kg based on ideal body weight due to obesity defined as BMI >30 (patient's BMI is Body mass index is 33.41 kg/m².  and IBW is Ideal body weight: 59.3 kg (130 lb 11.7 oz)Adjusted ideal body weight: 73.1 kg (161 lb 3.8 oz))      Repeat lactate level:  3.8    Reassessment Exam:   I have reassessed tissue perfusion and hemodynamic status after fluid bolus at this time: Patient remains hemodynamically stable. Is alert and appropriate. Reviewed nurse's documentation of vital signs. Card/Pulm:  Rhythm: normal rate. Heart Sounds: Normal S1, S2 and no murmurs, gallops, or rubs. unlabored breathing. Capillary Refill: normal.  Radial Pulse:  equal.  Skin:  pale      Darian Ravi DO        Final Impression:  1. Septic shock ([facility] [address]    2. Metastatic breast cancer ([facility] [address]    3. Hyperbilirubinemia      DISPOSITION Admitted 08/03/2022 07:10:25 PM      Patient referred to: No follow-up provider specified.   Discharge medications:  Current Discharge Medication List        (Please note that portions of this note may have been completed with a voice recognition program. Efforts were made to edit the dictations but occasionally words are mis-transcribed.)    DO Dulce Lawton DO  08/04/22 1100

## 2022-08-03 NOTE — TELEPHONE ENCOUNTER
Called and spoke with pt son and informed him that pt needs to go to the ER to be evaluated. Pt son states pt did feel a little better last night, but if she is still not taking in fluids and urinating, pt's son will take her to the ER.  Pt's son voiced understanding

## 2022-08-04 ENCOUNTER — TELEPHONE (OUTPATIENT)
Dept: ONCOLOGY | Age: 72
End: 2022-08-04

## 2022-08-04 LAB
ALBUMIN SERPL-MCNC: 2.4 GM/DL (ref 3.4–5)
ALBUMIN SERPL-MCNC: 2.4 GM/DL (ref 3.4–5)
ALP BLD-CCNC: 3158 IU/L (ref 40–129)
ALP BLD-CCNC: 3158 IU/L (ref 40–129)
ALT SERPL-CCNC: 378 U/L (ref 10–40)
ALT SERPL-CCNC: 378 U/L (ref 10–40)
ANION GAP SERPL CALCULATED.3IONS-SCNC: 12 MMOL/L (ref 4–16)
AST SERPL-CCNC: 691 IU/L (ref 15–37)
AST SERPL-CCNC: 691 IU/L (ref 15–37)
BANDED NEUTROPHILS ABSOLUTE COUNT: 0.2 K/CU MM
BANDED NEUTROPHILS RELATIVE PERCENT: 1 % (ref 5–11)
BILIRUB SERPL-MCNC: 17.8 MG/DL (ref 0–1)
BILIRUB SERPL-MCNC: 17.8 MG/DL (ref 0–1)
BILIRUBIN DIRECT: 13.4 MG/DL (ref 0–0.3)
BILIRUBIN, INDIRECT: 4.4 MG/DL (ref 0–0.7)
BUN BLDV-MCNC: 40 MG/DL (ref 6–23)
CALCIUM SERPL-MCNC: 9 MG/DL (ref 8.3–10.6)
CHLORIDE BLD-SCNC: 94 MMOL/L (ref 99–110)
CHOLESTEROL: 125 MG/DL
CO2: 22 MMOL/L (ref 21–32)
CREAT SERPL-MCNC: 0.3 MG/DL (ref 0.6–1.1)
DIFFERENTIAL TYPE: ABNORMAL
GFR AFRICAN AMERICAN: >60 ML/MIN/1.73M2
GFR NON-AFRICAN AMERICAN: >60 ML/MIN/1.73M2
GLUCOSE BLD-MCNC: 117 MG/DL (ref 70–99)
HCT VFR BLD CALC: 45.2 % (ref 37–47)
HDLC SERPL-MCNC: 17 MG/DL
HEMOGLOBIN: 15.9 GM/DL (ref 12.5–16)
INR BLD: 1.6 INDEX
LACTATE: 3.8 MMOL/L (ref 0.4–2)
LDL CHOLESTEROL CALCULATED: 94 MG/DL
LYMPHOCYTES ABSOLUTE: 1.6 K/CU MM
LYMPHOCYTES RELATIVE PERCENT: 8 % (ref 24–44)
MAGNESIUM: 2.4 MG/DL (ref 1.8–2.4)
MCH RBC QN AUTO: 30.1 PG (ref 27–31)
MCHC RBC AUTO-ENTMCNC: 35.2 % (ref 32–36)
MCV RBC AUTO: 85.6 FL (ref 78–100)
MONOCYTES ABSOLUTE: 2 K/CU MM
MONOCYTES RELATIVE PERCENT: 10 % (ref 0–4)
PDW BLD-RTO: 25.7 % (ref 11.7–14.9)
PLATELET # BLD: 117 K/CU MM (ref 140–440)
PMV BLD AUTO: 10 FL (ref 7.5–11.1)
POLYCHROMASIA: ABNORMAL
POTASSIUM SERPL-SCNC: 4.8 MMOL/L (ref 3.5–5.1)
PROCALCITONIN: 2.07
PROTHROMBIN TIME: 20.8 SECONDS (ref 11.7–14.5)
RBC # BLD: 5.28 M/CU MM (ref 4.2–5.4)
SEGMENTED NEUTROPHILS ABSOLUTE COUNT: 16.2 K/CU MM
SEGMENTED NEUTROPHILS RELATIVE PERCENT: 81 % (ref 36–66)
SODIUM BLD-SCNC: 128 MMOL/L (ref 135–145)
T4 FREE: 0.82 NG/DL (ref 0.9–1.8)
TOTAL PROTEIN: 5.7 GM/DL (ref 6.4–8.2)
TOTAL PROTEIN: 5.7 GM/DL (ref 6.4–8.2)
TRIGL SERPL-MCNC: 70 MG/DL
TSH HIGH SENSITIVITY: 0.51 UIU/ML (ref 0.27–4.2)
WBC # BLD: 20 K/CU MM (ref 4–10.5)

## 2022-08-04 PROCEDURE — 6370000000 HC RX 637 (ALT 250 FOR IP): Performed by: NURSE PRACTITIONER

## 2022-08-04 PROCEDURE — 94761 N-INVAS EAR/PLS OXIMETRY MLT: CPT

## 2022-08-04 PROCEDURE — 99223 1ST HOSP IP/OBS HIGH 75: CPT | Performed by: INTERNAL MEDICINE

## 2022-08-04 PROCEDURE — 85027 COMPLETE CBC AUTOMATED: CPT

## 2022-08-04 PROCEDURE — 83735 ASSAY OF MAGNESIUM: CPT

## 2022-08-04 PROCEDURE — 85025 COMPLETE CBC W/AUTO DIFF WBC: CPT

## 2022-08-04 PROCEDURE — 6370000000 HC RX 637 (ALT 250 FOR IP): Performed by: STUDENT IN AN ORGANIZED HEALTH CARE EDUCATION/TRAINING PROGRAM

## 2022-08-04 PROCEDURE — 80061 LIPID PANEL: CPT

## 2022-08-04 PROCEDURE — 6360000002 HC RX W HCPCS: Performed by: STUDENT IN AN ORGANIZED HEALTH CARE EDUCATION/TRAINING PROGRAM

## 2022-08-04 PROCEDURE — 80053 COMPREHEN METABOLIC PANEL: CPT

## 2022-08-04 PROCEDURE — 85007 BL SMEAR W/DIFF WBC COUNT: CPT

## 2022-08-04 PROCEDURE — 6360000002 HC RX W HCPCS: Performed by: NURSE PRACTITIONER

## 2022-08-04 PROCEDURE — 2580000003 HC RX 258: Performed by: STUDENT IN AN ORGANIZED HEALTH CARE EDUCATION/TRAINING PROGRAM

## 2022-08-04 PROCEDURE — 80076 HEPATIC FUNCTION PANEL: CPT

## 2022-08-04 PROCEDURE — 1200000000 HC SEMI PRIVATE

## 2022-08-04 PROCEDURE — 84145 PROCALCITONIN (PCT): CPT

## 2022-08-04 PROCEDURE — 87081 CULTURE SCREEN ONLY: CPT

## 2022-08-04 PROCEDURE — 36415 COLL VENOUS BLD VENIPUNCTURE: CPT

## 2022-08-04 PROCEDURE — 83605 ASSAY OF LACTIC ACID: CPT

## 2022-08-04 PROCEDURE — 85610 PROTHROMBIN TIME: CPT

## 2022-08-04 PROCEDURE — 2580000003 HC RX 258: Performed by: NURSE PRACTITIONER

## 2022-08-04 RX ORDER — LACTULOSE 10 G/15ML
20 SOLUTION ORAL 3 TIMES DAILY
Status: DISCONTINUED | OUTPATIENT
Start: 2022-08-04 | End: 2022-08-08 | Stop reason: HOSPADM

## 2022-08-04 RX ADMIN — ENOXAPARIN SODIUM 40 MG: 100 INJECTION SUBCUTANEOUS at 18:04

## 2022-08-04 RX ADMIN — SODIUM CHLORIDE 1000 ML: 9 INJECTION, SOLUTION INTRAVENOUS at 12:00

## 2022-08-04 RX ADMIN — LEVETIRACETAM 500 MG: 500 TABLET, FILM COATED ORAL at 09:40

## 2022-08-04 RX ADMIN — LACTULOSE 20 G: 10 SOLUTION ORAL at 14:59

## 2022-08-04 RX ADMIN — LACTULOSE 20 G: 10 SOLUTION ORAL at 20:18

## 2022-08-04 RX ADMIN — VANCOMYCIN HYDROCHLORIDE 2000 MG: 1 INJECTION, POWDER, LYOPHILIZED, FOR SOLUTION INTRAVENOUS at 00:04

## 2022-08-04 RX ADMIN — CEFEPIME HYDROCHLORIDE 2000 MG: 2 INJECTION, POWDER, FOR SOLUTION INTRAVENOUS at 02:52

## 2022-08-04 RX ADMIN — SODIUM CHLORIDE, PRESERVATIVE FREE 10 ML: 5 INJECTION INTRAVENOUS at 20:21

## 2022-08-04 RX ADMIN — LEVETIRACETAM 500 MG: 500 TABLET, FILM COATED ORAL at 20:18

## 2022-08-04 RX ADMIN — DEXAMETHASONE 4 MG: 4 TABLET ORAL at 20:18

## 2022-08-04 RX ADMIN — PANTOPRAZOLE SODIUM 40 MG: 40 TABLET, DELAYED RELEASE ORAL at 09:41

## 2022-08-04 RX ADMIN — SODIUM CHLORIDE, PRESERVATIVE FREE 10 ML: 5 INJECTION INTRAVENOUS at 09:42

## 2022-08-04 RX ADMIN — DEXAMETHASONE 4 MG: 4 TABLET ORAL at 09:41

## 2022-08-04 RX ADMIN — VANCOMYCIN HYDROCHLORIDE 1250 MG: 1.25 INJECTION, POWDER, LYOPHILIZED, FOR SOLUTION INTRAVENOUS at 12:02

## 2022-08-04 RX ADMIN — SENNOSIDES AND DOCUSATE SODIUM 2 TABLET: 50; 8.6 TABLET ORAL at 09:40

## 2022-08-04 RX ADMIN — CEFEPIME HYDROCHLORIDE 2000 MG: 2 INJECTION, POWDER, FOR SOLUTION INTRAVENOUS at 15:22

## 2022-08-04 ASSESSMENT — PAIN SCALES - GENERAL
PAINLEVEL_OUTOF10: 0

## 2022-08-04 NOTE — PROGRESS NOTES
V2.0  [facility] Hospitalist Progress Note      Name:  Bronwyn Ramos Senior /Age/Sex: 1950  (70 y.o. female)   MRN & CSN:  [id number] & [id number] Encounter Date/Time: 2022 5:39 PM EDT    Location:  [address] [place] [address]-A PCP: Maureen Dallas [address] Day: 2    Assessment and Plan:   #Severe sepsis in immunocompromised host. Tachycardia, leukocytosis and lactic acidosis 4.7 on admission. No source of infection identified. CXR, CT abd with no source of infection only neoplasm findings. Lactic acid is likely type B 2/2 liver failure from mets. Will treat as severe sepsis until infection is ruled out. Hemodynamically stable, no evidence of hypotension. Received sepsis fluid and still on IVF. - follow up Blood cultures X2  - contiue vancomycin, cefepime. 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 S/p chemo/rad, mastectomy. Now has mets to liver with biopsy suggestive of breast primary and mets to brain s/p gamma knife therapy  on steroids taper.  - hem/onc consulted; appreciate recs. - continue home steroids taper  - continue Keppra  - plan as above      #Liver failure 2/2 metastatic liver disease  AST, ALT in the hundreds, bilirubin ~17, alk 3000's, ammonia 75. INR trending upward last one 1.6 .    - continue supportive therapy   - given mental status, weakness will treat with lactulose for hepatic encephalopathy   - had U/S 2022 with evidence of liver mets; would hold off repeat U/S   - hepatitis panel  - trend hepatic function panel    #acute metabolic encephalopathy likely 2/2 hepatic from liver mets. Elevated ammonia. - ammonia as above                 #Hx of Essential hypertension  BP stable 130's. Holding home spironolactone, hydrochlorothiazide given hyperkalemia. #Dispo. Family requested to transfer pt to [facility]. Contacted transfer center, received a call from [facility].  Pt was accepted but awaiting service and bed assignment. Diet ADULT DIET; Regular   DVT Prophylaxis [x] Lovenox, []  Heparin, [] SCDs, [] Ambulation,  [] Eliquis, [] Xarelto  [] Coumadin   Code Status Full Code   Disposition From: home  Expected Disposition: family requested transfer to [facility]  Estimated Date of Discharge: TBD  Patient requires continued admission due to sepsis work up   Surrogate Decision Maker/ POA      Subjective:     Chief Complaint: Fatigue (Sent by pcp for possible dehydration)     No acute event overnight. Pt still feeling fatigue, lethargic but complains of no pain. Denies lightheadedness, dizziness, fever, night sweats, chills, chest pain, cough, dyspnea, palpitations, abd pain, nausea, vomiting, diarrhea, dysuria. Review of Systems:    Review of Systems    See above    Objective:   No intake or output data in the 24 hours ending 08/04/22 1803     Vitals:   Vitals:    08/04/22 1424   BP: 130/72   Pulse: 79   Resp: 14   Temp: 97.7 °F (36.5 °C)   SpO2: 95%       Physical Exam:     General: NAD  Eyes: EOMI  ENT: neck supple  Cardiovascular: Regular rate. Respiratory: Clear to auscultation  Gastrointestinal: Soft, non tender  Genitourinary: no suprapubic tenderness  Musculoskeletal: No edema  Skin: warm, dry  Neuro: Alert, slightly lethargic but awakens to questions. Psych: Mood appropriate.      Medications:   Medications:    lactulose  20 g Oral TID    levETIRAcetam  500 mg Oral BID    pantoprazole  40 mg Oral Daily    sennosides-docusate sodium  2 tablet Oral Daily    [Held by provider] spironolactone-hydroCHLOROthiazide  1 tablet Oral Daily    sodium chloride flush  5-40 mL IntraVENous 2 times per day    enoxaparin  40 mg SubCUTAneous QPM    cefepime  2,000 mg IntraVENous Q12H    dexamethasone  4 mg Oral BID    [START ON 8/5/2022] dexamethasone  4 mg Oral Daily    [START ON 8/8/2022] dexamethasone  2 mg Oral Daily    vancomycin  1,250 mg IntraVENous Q12H      Infusions:    sodium chloride      sodium chloride 1,000 mL (08/04/22 1200)     PRN Meds: sodium chloride flush, 5-40 mL, PRN  sodium chloride, , PRN  acetaminophen, 650 mg, Q6H PRN   Or  acetaminophen, 650 mg, Q6H PRN  ondansetron, 4 mg, Q8H PRN   Or  ondansetron, 4 mg, Q6H PRN  polyethylene glycol, 17 g, Daily PRN      Labs      Recent Results (from the past 24 hour(s))   TSH without Reflex    Collection Time: 08/03/22 10:29 PM   Result Value Ref Range    TSH, High Sensitivity 0.513 0.270 - 4.20 uIu/ml   T4, free    Collection Time: 08/03/22 10:29 PM   Result Value Ref Range    T4 Free 0.82 (L) 0.9 - 1.8 NG/DL   Ammonia    Collection Time: 08/03/22 10:29 PM   Result Value Ref Range    Ammonia 75 (H) 11 - 51 UMOL/L   Lactic Acid    Collection Time: 08/04/22 10:13 AM   Result Value Ref Range    Lactate 3.8 (HH) 0.4 - 2.0 mMOL/L   Comprehensive Metabolic Panel    Collection Time: 08/04/22 10:13 AM   Result Value Ref Range    Sodium 128 (L) 135 - 145 MMOL/L    Potassium 4.8 3.5 - 5.1 MMOL/L    Chloride 94 (L) 99 - 110 mMol/L    CO2 22 21 - 32 MMOL/L    BUN 40 (H) 6 - 23 MG/DL    Creatinine 0.3 (L) 0.6 - 1.1 MG/DL    Glucose 117 (H) 70 - 99 MG/DL    Calcium 9.0 8.3 - 10.6 MG/DL    Albumin 2.4 (L) 3.4 - 5.0 GM/DL    Total Protein 5.7 (L) 6.4 - 8.2 GM/DL    Total Bilirubin 17.8 (H) 0.0 - 1.0 MG/DL     (H) 10 - 40 U/L     (H) 15 - 37 IU/L    Alkaline Phosphatase 3,158 (H) 40 - 129 IU/L    GFR Non-African American >60 >60 mL/min/1.73m2    GFR African American >60 >60 mL/min/1.73m2    Anion Gap 12 4 - 16   Magnesium    Collection Time: 08/04/22 10:13 AM   Result Value Ref Range    Magnesium 2.4 1.8 - 2.4 mg/dl   CBC with Auto Differential    Collection Time: 08/04/22 10:13 AM   Result Value Ref Range    WBC 20.0 (H) 4.0 - 10.5 K/CU MM    RBC 5.28 4.2 - 5.4 M/CU MM    Hemoglobin 15.9 12.5 - 16.0 GM/DL    Hematocrit 45.2 37 - 47 %    MCV 85.6 78 - 100 FL    MCH 30.1 27 - 31 PG    MCHC 35.2 32.0 - 36.0 %    RDW 25.7 (H) 11.7 - 14.9 %    Platelets 427 (L) 471 - 440 K/CU MM    MPV 10.0 7.5 - 11.1 FL    Bands Relative 1 (L) 5 - 11 %    Segs Relative 81.0 (H) 36 - 66 %    Lymphocytes % 8.0 (L) 24 - 44 %    Monocytes % 10.0 (H) 0 - 4 %    Bands Absolute 0.20 K/CU MM    Segs Absolute 16.2 K/CU MM    Lymphocytes Absolute 1.6 K/CU MM    Monocytes Absolute 2.0 K/CU MM    Differential Type MANUAL DIFFERENTIAL     Polychromasia 1+    Hepatic Function Panel    Collection Time: 08/04/22 10:13 AM   Result Value Ref Range    Albumin 2.4 (L) 3.4 - 5.0 GM/DL    Total Bilirubin 17.8 (H) 0.0 - 1.0 MG/DL    Bilirubin, Direct 13.4 (H) 0.0 - 0.3 MG/DL    Bilirubin, Indirect 4.4 (H) 0 - 0.7 MG/DL    Alkaline Phosphatase 3,158 (H) 40 - 129 IU/L     (H) 15 - 37 IU/L     (H) 10 - 40 U/L    Total Protein 5.7 (L) 6.4 - 8.2 GM/DL   Lipid Panel    Collection Time: 08/04/22 10:13 AM   Result Value Ref Range    Triglycerides 70 <150 MG/DL    Cholesterol 125 <200 MG/DL    HDL 17 (L) >40 MG/DL    LDL Calculated 94 <100 MG/DL   Protime-INR    Collection Time: 08/04/22 12:29 PM   Result Value Ref Range    Protime 20.8 (H) 11.7 - 14.5 SECONDS    INR 1.60 INDEX        Imaging/Diagnostics Last 24 Hours   CT ABDOMEN PELVIS W IV CONTRAST Additional Contrast? None    Result Date: 8/3/2022  EXAMINATION: CT OF THE ABDOMEN AND PELVIS WITH CONTRAST 8/3/2022 10:53 am TECHNIQUE: CT of the abdomen and pelvis was performed with the administration of intravenous contrast. Multiplanar reformatted images are provided for review. Automated exposure control, iterative reconstruction, and/or weight based adjustment of the mA/kV was utilized to reduce the radiation dose to as low as reasonably achievable. COMPARISON: None. HISTORY: ORDERING SYSTEM PROVIDED HISTORY: biliary obstruction. hx metastatic breast CA TECHNOLOGIST PROVIDED HISTORY: Reason for exam:->biliary obstruction.  hx metastatic breast CA Additional Contrast?->None Decision Support Exception - unselect if not a suspected or confirmed emergency medical condition->Emergency Medical Condition (MA) Reason for Exam: biliary obstruction. hx metastatic breast CA FINDINGS: Lower Chest: A mass within the right middle lobe is partially imaged, measuring at least 4 cm. Innumerable noncalcified nodules are seen within the lower lungs. Example in the right lower lobe measures 9 mm (2, 7). Base of the heart is unremarkable. Left breast prosthesis partially imaged. Visualized extra thoracic soft tissues otherwise unremarkable. . Organs: Innumerable hypodense lesions are seen throughout the liver. Largest conglomerate measures roughly 7 cm (2, 37). Portal vein remains patent. The gallbladder is nondistended. Liver unremarkable. Indeterminate left adrenal nodule measures 1.1 cm (2, 36). Right adrenal gland unremarkable. Pancreas unremarkable. No acute or suspicious renal abnormalities are identified. GI/Bowel: Mild diverticulosis of the large bowel is present without CT evidence of diverticulitis. The large bowel is otherwise unremarkable. The appendix is not well visualized. No asymmetric pericecal inflammation is seen to suggest acute appendicitis. Distal esophagus unremarkable. Stomach unremarkable. Duodenal sweep and the remainder of the small bowel are unremarkable. Pelvis: Fibroid uterus noted. Small amount of free pelvic fluid noted. Urinary bladder unremarkable. Peritoneum/Retroperitoneum: Abdominal aorta normal in caliber. Superior mesenteric artery is enhancing no pathologically large retroperitoneal lymph nodes are seen. There is a small amount of free intra-abdominal fluid, mainly in the perihepatic space and within the pelvis. Bones/Soft Tissues: No acute or suspicious bony abnormalities are identified. Extensive metastatic disease throughout the liver, occupying the majority of the hepatic parenchyma. Mass within the right middle lobe, incompletely evaluated, possibly the primary neoplasm.  Numerous nodules are seen within the visualized lungs, compatible with metastatic disease. XR CHEST PORTABLE    Result Date: 8/3/2022  EXAMINATION: ONE XRAY VIEW OF THE CHEST 8/3/2022 12:23 pm COMPARISON: August 27, 2015 HISTORY: ORDERING SYSTEM PROVIDED HISTORY: Shortness of breath TECHNOLOGIST PROVIDED HISTORY: Reason for exam:->dyspnea FINDINGS: Cardiomediastinal silhouette within normal limits. Right basilar rounded opacity concerning for a mass. Numerous bilateral subtle rounded densities present bilaterally. No definite effusion. No pneumothorax or subdiaphragmatic free air. No acute osseous abnormality identified. Rounded right basilar opacity concerning for a mass. Numerous bilateral nodular densities noted bilaterally concerning for metastatic disease. CT of the chest with contrast recommended for further evaluation.        Electronically signed by Hao Ochoa MD on 8/4/2022 at 6:03 PM

## 2022-08-04 NOTE — CONSULTS
ONCOLOGY HEMATOLOGY CARE ([facility])  CONSULTATION REPORT    REASON FOR CONSULT  Metastatic breast cancer    CHIEF COMPLAINT    Chief Complaint   Patient presents with    Fatigue     Sent by pcp for possible dehydration     HISTORY OF PRESENT ILLNESS   Burton Wilkinson is a 70 y.o. female who presents generalized weakness and jaundice. She was recently diagnosed with triple negative metastatic breast cancer with visceral involvement and brain mets. S/p gamma knife on 8/1/2022 to the  brain at [facility]  She was seen by Dr Dyana Paiz for [address] At [facility] which was canceled due to generalized weakness and jaundice. She was instructed to go to hospital.    CT abdomen and pelvis 8/3/2022  Extensive metastatic disease throughout the liver, occupying the majority of  the hepatic parenchyma. Mass within the right middle lobe, incompletely evaluated, possibly the  primary neoplasm. Numerous nodules are seen within the visualized lungs, compatible with  metastatic disease. CXR 8/3/2022:  Rounded right basilar opacity concerning for a mass. Numerous bilateral  nodular densities noted bilaterally concerning for metastatic disease. CT of  the chest with contrast recommended for further evaluation. 8/3/22   Albumin: 2.3 (L)  Alk Phos: 3,403 (H)  ALT: 429 (H)  AST: 732 (H)  Bilirubin: 17.6 (H)  Bilirubin, Direct: 13.1 (H)  Total Protein: 7.3    I discussed with her about her poor prognosis. She wants me to meet son and brother in AM.  She feels slightly better this morning. Septic w/u was ordered and she is on ABX.     PAST MEDICAL HISTORY    Past Medical History:   Diagnosis Date    Breast cancer ([facility]     Cancer (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[facility])     fingers and toes    Ductal carcinoma in situ (DCIS) of right breast     History of external beam radiation therapy 2015    [facility] Breast [facility]    History of external beam radiation therapy 2002    Left Breast/  at [facility]    Lymphedema of right upper extremity     Pedal edema      SURGICAL HISTORY    Past Surgical History:   Procedure Laterality Date    BREAST BIOPSY Left 2002    BREAST BIOPSY Right 2014    BREAST LUMPECTOMY Left 2002    BREAST LUMPECTOMY Right 2014    Right Lumpectomy with [place] Node Bx    BREAST SURGERY Left 2011    Mastectomy with [place] Bx and Reconstruction    IR BIOPSY LIVER PERCUTANEOUS  7/18/2022    IR BIOPSY LIVER PERCUTANEOUS 7/18/2022 [facility] PROCED[facility]    TUNNELED VENOUS PORT PLACEMENT       FAMILY HISTORY    Family History   Problem Relation Age of Onset    Heart Failure Mother     High Blood Pressure Mother     Kidney Disease Father     Other Brother         homicide    Other Brother         sepsis    Breast Cancer Neg Hx      SOCIAL HISTORY    Social History     Socioeconomic History    Marital status:      Spouse name: None    Number of children: None    Years of education: None    Highest education level: None   Tobacco Use    Smoking status: Never    Smokeless tobacco: Never   Vaping Use    Vaping Use: Never used   Substance and Sexual Activity    Alcohol use: No    Drug use: No    Sexual activity: Yes     Partners: Male     REVIEW OF SYSTEMS    Constitutional:  Denies fever, chills, low of appetite. + tiredness, fatigue or weakness   HEENT:  Denies swelling of neck glands  Respiratory:  Denies cough, shortness of breath or hemoptysis  Cardiovascular:  Denies chest pain, palpitations or swelling   GI:  Denies abdominal pain, nausea, vomiting, constipation or diarrhea   Musculoskeletal:  Denies back pain   Skin:  Denies rash   Neurologic:  Denies headache, focal weakness or sensory changes   All systems negative except as marked.      PHYSICAL EXAM    Vitals: /85   Pulse 76   Temp 97.7 °F (36.5 °C)   Resp 24   Ht 5' 6\" (1.676 m)   Wt 207 lb (93.9 kg)   SpO2 97%   BMI 33.41 kg/m²   CONSTITUTIONAL: awake, alert, cooperative, no apparent distress   EYES: pupils equal, round and reactive to light, icteric sclera and conjunctiva normal  ENT: Normocephalic, without obvious abnormality, atraumatic  NECK: supple, symmetrical, no jugular venous distension and no carotid bruits   HEMATOLOGIC/LYMPHATIC: no cervical, supraclavicular or axillary lymphadenopathy   LUNGS: VBS, no wheezes, no crackles, no rhonchi, no increased work of breathing and clear to auscultation   CARDIOVASCULAR: regular rate and rhythm, normal S1 and S2, no murmur noted  ABDOMEN: normal bowel sound, soft, non-distended, non-tender, no masses palpated, no hepatosplenomgaly   MUSCULOSKELETAL: full range of motion noted, tone is normal  NEUROLOGIC: awake, alert, oriented to name, place and time. Motor skills grossly intact. SKIN: + jaundice. Skin appears intact   EXTREMITIES: no LE edema, no cyanosis, no clubbing    LABORATORY RESULTS  CBC:   Recent Labs     08/03/22  1135   WBC 18.1*   HGB 16.9*        BMP:    Recent Labs     08/03/22  1135   *   K 5.1   CL 91*   CO2 23   BUN 42*   CREATININE 0.6   GLUCOSE 104*     Hepatic:   Recent Labs     08/03/22  1135   *   *   BILITOT 17.6*   ALKPHOS 3,403*     ASSESSMENT/RECOMMENDATION    1. She has recent diagnosis of metastatic breast cancer with visceral crisis. S/p gamma knife for brain mets on 8/1/2022. CARIS study, genetic counseling were ordered on last OV. Overall prognosis is poor. I will have family meeting in AM at 8:00. I mentioned briefly the philosophy of hospice. 2. She has reactive leukocytosis. Elevated Hg is due to dehydration. 3. Septic w/u was ordered. On antibiotic coverage. To continue IVF hydration and supportive care,    We will follow the patient. Thank you for allowing me to participate in the care of this very pleasant patient.

## 2022-08-04 NOTE — TELEPHONE ENCOUNTER
Patient son Daly Mcbride would like to talk with you by phone or in person today. He will be out of town tomorrow.   Phone [phone]

## 2022-08-04 NOTE — PLAN OF CARE
Problem: Discharge Planning  Goal: Discharge to home or other facility with appropriate resources  Outcome: Progressing  Flowsheets (Taken 8/4/2022 0943)  Discharge to home or other facility with appropriate resources:   Identify barriers to discharge with patient and caregiver   Arrange for needed discharge resources and transportation as appropriate   Identify discharge learning needs (meds, wound care, etc)   Refer to discharge planning if patient needs post-hospital services based on physician order or complex needs related to functional status, cognitive ability or social support system     Problem: Safety - Adult  Goal: Free from fall injury  Outcome: Progressing  Flowsheets (Taken 8/4/2022 0950)  Free From Fall Injury: Instruct family/caregiver on patient safety

## 2022-08-04 NOTE — PROGRESS NOTES
[address] [facility]  consulted by Dr. Paul Wills for monitoring and adjustment. Indication for treatment: Sepsis  Goal trough: [] 10-15 mcg/mL or [x] 15-20 mcg/ml  AUC/PIETER: [] <500 or [x] 400-600    Pertinent Laboratory Values:   Temp Readings from Last 3 Encounters:   08/04/22 97.7 °F (36.5 °C)   08/03/22 (!) 96.7 °F (35.9 °C)   08/01/22 96.8 °F (36 °C) (Temporal)     Recent Labs     08/03/22  1135 08/03/22  1235 08/03/22  1530 08/04/22  1013   WBC 18.1*  --   --  20.0*   LACTATE  --  4.7* 3.8* 3.8*       Recent Labs     08/03/22  1135 08/04/22  1013   BUN 42* 40*   CREATININE 0.6 0.3*       Estimated Creatinine Clearance: 198 mL/min (A) (based on SCr of 0.3 mg/dL (L)). Intake/Output Summary (Last 24 hours) at 8/4/2022 1610  Last data filed at 8/3/2022 1715  Gross per 24 hour   Intake --   Output 250 ml   Net -250 ml         Pertinent Cultures:  Date    Source    Results  08/03   Blood    Collected  08/04   MRSA Screen (Nasal)  To be collected    Assessment:  SCr at baseline, limited UOP data  Day(s) of therapy: 2 of 7  Vancomycin concentration:   08/05 - To be collected    Plan:  Renal trends at baseline. Continue vancomycin 1,250 mg IV Q 12 Hours  Predicted AUC: 464; Predicted Trough: 14.0  Check the vanco level tomorrow am.  Pharmacy will continue to monitor patient and adjust therapy as indicated    VANCOMYCIN CONCENTRATION SCHEDULED FOR 08/05/2022 @ 6 AM    Thank you for the consult. Aaron Crabtree, [facility]  8/4/2022 4:10 PM

## 2022-08-04 NOTE — CARE COORDINATION
Met c pt and sons at bedside to initiate discharge planning- per family prior to admit pt was living alone and was fully ind with ADLs. Per chart review Dr. Kavitha Britton was going to meet with family tomorrow am to discuss prognosis etc however per family they have called Dr. Kavitha Britton and requested she be transferred to [facility] for further eval.  CM to follow as needed.

## 2022-08-04 NOTE — PROGRESS NOTES
4 Eyes Skin Assessment     NAME:  Burton Wilkinson  YOB: 1950  MEDICAL RECORD NUMBER:  [id number]    The patient is being assess for  Admission    I agree that 2 RN's have performed a thorough Head to Toe Skin Assessment on the patient. ALL assessment sites listed below have been assessed. Areas assessed by both nurses:    Head, Face, Ears, Shoulders, Back, Chest, Arms, Elbows, Hands, Sacrum. Buttock, Coccyx, Ischium, and Legs. Feet and Heels        Does the Patient have a Wound?  No noted wound(s) (Gamma knife radiation sites noted on head: two on the front, two on the back of the head, covered with small bandage)       Severino Prevention initiated:  Yes   Wound Care Orders initiated:  NA    Pressure Injury (Stage 3,4, Unstageable, DTI, NWPT, and Complex wounds) if present place consult order under [de-identified] No    New and Established Ostomies if present place consult order under : No      Nurse 1 eSignature: Electronically signed by Susanna Ortiz RN on 8/3/22 at 11:19 PM EDT    **SHARE this note so that the co-signing nurse is able to place an eSignature**    Nurse 2 eSignature: Electronically signed by Marysol Howard LPN on [birth year] at 6:42 AM EDT

## 2022-08-04 NOTE — PROGRESS NOTES
[address] [facility]  consulted by Dr. Flynn Saavedra for monitoring and adjustment. Indication for treatment: Sepsis  Goal trough: [] 10-15 mcg/mL or [x] 15-20 mcg/ml  AUC/PIETER: [] <500 or [x] 400-600    Pertinent Laboratory Values:   Temp Readings from Last 3 Encounters:   08/03/22 97.9 °F (36.6 °C) (Oral)   08/03/22 (!) 96.7 °F (35.9 °C)   08/01/22 96.8 °F (36 °C) (Temporal)     Recent Labs     08/03/22  1135 08/03/22  1235 08/03/22  1530   WBC 18.1*  --   --    LACTATE  --  4.7* 3.8*     Recent Labs     08/01/22  0630 08/03/22  1135   BUN 32* 42*   CREATININE 0.8 0.6     Estimated Creatinine Clearance: 100 mL/min (based on SCr of 0.6 mg/dL). Intake/Output Summary (Last 24 hours) at 8/3/2022 2250  Last data filed at 8/3/2022 1715  Gross per 24 hour   Intake --   Output 250 ml   Net -250 ml       Pertinent Cultures:  Date    Source    Results  08/03   Blood    Collected  08/04   MRSA Screen (Nasal)  To be collected    Assessment:  SCr = 0.6, BUN = 42, and limited I/O data  Day(s) of therapy: 1 of 7  Vancomycin concentration:   08/05 - To be collected    Plan:  Vancomycin 2,000 mg IV initial dose; Follow with Vancomycin 1,250 mg IV Q 12 Hours  Predicted AUC: 524; Predicted Trough: 16.4  Pharmacy will continue to monitor patient and adjust therapy as indicated    Kelly 3 08/05/2022 @ 6 AM    Thank you for the consult.   Daniel Collins, [facility]  8/3/2022 10:50 PM

## 2022-08-05 LAB
ALBUMIN SERPL-MCNC: 2.2 GM/DL (ref 3.4–5)
ALP BLD-CCNC: 3277 IU/L (ref 40–129)
ALT SERPL-CCNC: 379 U/L (ref 10–40)
ANION GAP SERPL CALCULATED.3IONS-SCNC: 14 MMOL/L (ref 4–16)
AST SERPL-CCNC: 636 IU/L (ref 15–37)
BILIRUB SERPL-MCNC: 18.4 MG/DL (ref 0–1)
BILIRUBIN DIRECT: 14.5 MG/DL (ref 0–0.3)
BILIRUBIN, INDIRECT: 3.9 MG/DL (ref 0–0.7)
BUN BLDV-MCNC: 32 MG/DL (ref 6–23)
CALCIUM SERPL-MCNC: 8.9 MG/DL (ref 8.3–10.6)
CHLORIDE BLD-SCNC: 97 MMOL/L (ref 99–110)
CO2: 20 MMOL/L (ref 21–32)
CREAT SERPL-MCNC: 0.5 MG/DL (ref 0.6–1.1)
DIFFERENTIAL TYPE: ABNORMAL
DOSE AMOUNT: NORMAL
DOSE TIME: NORMAL
GFR AFRICAN AMERICAN: >60 ML/MIN/1.73M2
GFR NON-AFRICAN AMERICAN: >60 ML/MIN/1.73M2
GLUCOSE BLD-MCNC: 105 MG/DL (ref 70–99)
HAV IGM SER IA-ACNC: NON REACTIVE
HCT VFR BLD CALC: 44.7 % (ref 37–47)
HEMOGLOBIN: 15.4 GM/DL (ref 12.5–16)
HEPATITIS B CORE IGM ANTIBODY: NON REACTIVE
HEPATITIS B SURFACE ANTIGEN: NON REACTIVE
HEPATITIS C ANTIBODY: NON REACTIVE
LACTATE: 4.5 MMOL/L (ref 0.4–2)
LYMPHOCYTES ABSOLUTE: 1.6 K/CU MM
LYMPHOCYTES RELATIVE PERCENT: 8 % (ref 24–44)
MCH RBC QN AUTO: 30.5 PG (ref 27–31)
MCHC RBC AUTO-ENTMCNC: 34.5 % (ref 32–36)
MCV RBC AUTO: 88.5 FL (ref 78–100)
MONOCYTES ABSOLUTE: 0.8 K/CU MM
MONOCYTES RELATIVE PERCENT: 4 % (ref 0–4)
NUCLEATED RED BLOOD CELLS: 2
PDW BLD-RTO: 26.1 % (ref 11.7–14.9)
PLATELET # BLD: 124 K/CU MM (ref 140–440)
PLT MORPHOLOGY: ABNORMAL
POTASSIUM SERPL-SCNC: 4.5 MMOL/L (ref 3.5–5.1)
RBC # BLD: 5.05 M/CU MM (ref 4.2–5.4)
SEGMENTED NEUTROPHILS ABSOLUTE COUNT: 17.2 K/CU MM
SEGMENTED NEUTROPHILS RELATIVE PERCENT: 88 % (ref 36–66)
SODIUM BLD-SCNC: 131 MMOL/L (ref 135–145)
TOTAL PROTEIN: 5.5 GM/DL (ref 6.4–8.2)
VANCOMYCIN RANDOM: 21.2 UG/ML
WBC # BLD: 19.6 K/CU MM (ref 4–10.5)

## 2022-08-05 PROCEDURE — 2580000003 HC RX 258: Performed by: STUDENT IN AN ORGANIZED HEALTH CARE EDUCATION/TRAINING PROGRAM

## 2022-08-05 PROCEDURE — 6360000002 HC RX W HCPCS: Performed by: STUDENT IN AN ORGANIZED HEALTH CARE EDUCATION/TRAINING PROGRAM

## 2022-08-05 PROCEDURE — 6370000000 HC RX 637 (ALT 250 FOR IP): Performed by: STUDENT IN AN ORGANIZED HEALTH CARE EDUCATION/TRAINING PROGRAM

## 2022-08-05 PROCEDURE — 80053 COMPREHEN METABOLIC PANEL: CPT

## 2022-08-05 PROCEDURE — 1200000000 HC SEMI PRIVATE

## 2022-08-05 PROCEDURE — 82248 BILIRUBIN DIRECT: CPT

## 2022-08-05 PROCEDURE — 2580000003 HC RX 258: Performed by: NURSE PRACTITIONER

## 2022-08-05 PROCEDURE — 6360000002 HC RX W HCPCS: Performed by: NURSE PRACTITIONER

## 2022-08-05 PROCEDURE — 85027 COMPLETE CBC AUTOMATED: CPT

## 2022-08-05 PROCEDURE — 85007 BL SMEAR W/DIFF WBC COUNT: CPT

## 2022-08-05 PROCEDURE — 80074 ACUTE HEPATITIS PANEL: CPT

## 2022-08-05 PROCEDURE — 36415 COLL VENOUS BLD VENIPUNCTURE: CPT

## 2022-08-05 PROCEDURE — 80202 ASSAY OF VANCOMYCIN: CPT

## 2022-08-05 PROCEDURE — 83605 ASSAY OF LACTIC ACID: CPT

## 2022-08-05 PROCEDURE — 94761 N-INVAS EAR/PLS OXIMETRY MLT: CPT

## 2022-08-05 PROCEDURE — 6370000000 HC RX 637 (ALT 250 FOR IP): Performed by: NURSE PRACTITIONER

## 2022-08-05 PROCEDURE — 99232 SBSQ HOSP IP/OBS MODERATE 35: CPT | Performed by: INTERNAL MEDICINE

## 2022-08-05 RX ORDER — SODIUM CHLORIDE 9 MG/ML
INJECTION, SOLUTION INTRAVENOUS CONTINUOUS
Status: DISCONTINUED | OUTPATIENT
Start: 2022-08-05 | End: 2022-08-05

## 2022-08-05 RX ORDER — IPRATROPIUM BROMIDE AND ALBUTEROL SULFATE 2.5; .5 MG/3ML; MG/3ML
1 SOLUTION RESPIRATORY (INHALATION) EVERY 4 HOURS PRN
Status: DISCONTINUED | OUTPATIENT
Start: 2022-08-05 | End: 2022-08-08 | Stop reason: HOSPADM

## 2022-08-05 RX ADMIN — SODIUM CHLORIDE: 9 INJECTION, SOLUTION INTRAVENOUS at 14:22

## 2022-08-05 RX ADMIN — SODIUM CHLORIDE: 9 INJECTION, SOLUTION INTRAVENOUS at 19:16

## 2022-08-05 RX ADMIN — SENNOSIDES AND DOCUSATE SODIUM 2 TABLET: 50; 8.6 TABLET ORAL at 09:23

## 2022-08-05 RX ADMIN — LACTULOSE 20 G: 10 SOLUTION ORAL at 20:18

## 2022-08-05 RX ADMIN — SODIUM CHLORIDE: 9 INJECTION, SOLUTION INTRAVENOUS at 01:19

## 2022-08-05 RX ADMIN — ACETAMINOPHEN 650 MG: 325 TABLET ORAL at 20:18

## 2022-08-05 RX ADMIN — PANTOPRAZOLE SODIUM 40 MG: 40 TABLET, DELAYED RELEASE ORAL at 09:23

## 2022-08-05 RX ADMIN — LACTULOSE 20 G: 10 SOLUTION ORAL at 09:23

## 2022-08-05 RX ADMIN — DEXAMETHASONE 4 MG: 4 TABLET ORAL at 09:23

## 2022-08-05 RX ADMIN — SODIUM CHLORIDE: 9 INJECTION, SOLUTION INTRAVENOUS at 12:50

## 2022-08-05 RX ADMIN — SODIUM CHLORIDE, PRESERVATIVE FREE 10 ML: 5 INJECTION INTRAVENOUS at 09:24

## 2022-08-05 RX ADMIN — VANCOMYCIN HYDROCHLORIDE 1250 MG: 1.25 INJECTION, POWDER, LYOPHILIZED, FOR SOLUTION INTRAVENOUS at 12:51

## 2022-08-05 RX ADMIN — VANCOMYCIN HYDROCHLORIDE 1250 MG: 1.25 INJECTION, POWDER, LYOPHILIZED, FOR SOLUTION INTRAVENOUS at 01:22

## 2022-08-05 RX ADMIN — LEVETIRACETAM 500 MG: 500 TABLET, FILM COATED ORAL at 20:19

## 2022-08-05 RX ADMIN — LEVETIRACETAM 500 MG: 500 TABLET, FILM COATED ORAL at 09:23

## 2022-08-05 RX ADMIN — CEFEPIME HYDROCHLORIDE 2000 MG: 2 INJECTION, POWDER, FOR SOLUTION INTRAVENOUS at 14:22

## 2022-08-05 RX ADMIN — CEFEPIME HYDROCHLORIDE 2000 MG: 2 INJECTION, POWDER, FOR SOLUTION INTRAVENOUS at 00:38

## 2022-08-05 ASSESSMENT — PAIN SCALES - GENERAL
PAINLEVEL_OUTOF10: 0

## 2022-08-05 NOTE — PROGRESS NOTES
[address] [facility]  consulted by Dr. Anneliese Washington for monitoring and adjustment. Indication for treatment: Sepsis  Goal trough: [] 10-15 mcg/mL or [x] 15-20 mcg/ml  AUC/PIETER: [] <500 or [x] 400-600    Pertinent Laboratory Values:   Temp Readings from Last 3 Encounters:   08/05/22 97.5 °F (36.4 °C) (Oral)   08/03/22 (!) 96.7 °F (35.9 °C)   08/01/22 96.8 °F (36 °C) (Temporal)     Recent Labs     08/03/22  1135 08/03/22  1235 08/03/22  1530 08/04/22  1013 08/05/22  0017   WBC 18.1*  --   --  20.0* 19.6*   LACTATE  --    < > 3.8* 3.8* 4.5*    < > = values in this interval not displayed. Recent Labs     08/03/22  1135 08/04/22  1013 08/05/22  0017   BUN 42* 40* 32*   CREATININE 0.6 0.3* 0.5*       Estimated Creatinine Clearance: 119 mL/min (A) (based on SCr of 0.5 mg/dL (L)). Intake/Output Summary (Last 24 hours) at 8/5/2022 1340  Last data filed at 8/5/2022 1003  Gross per 24 hour   Intake 0 ml   Output --   Net 0 ml         Pertinent Cultures:  Date    Source    Results  08/03   Blood    Collected  08/04   MRSA Screen (Nasal)  To be collected    VANCOMYCIN TROUGH:  No results for input(s): VANCOTROUGH in the last 72 hours. VANCOMYCIN RANDOM:    Recent Labs     08/05/22  0017   VANCORANDOM 21.2     Assessment:  SCr remains at baseline, no UOP data  Day(s) of therapy: 3 of 7  Vancomycin concentration:   08/05 - 21.2, 12 hour level on 1250mg q12h,     Plan:  Renal trends remain at baseline. Vanco level this am predicts an AUC above target on the current regimen. Decrease to vancomycin 750mg ivpb q12h for a predicted AUC of 422 at steady state  Recheck the vanco level in 2 days  Pharmacy will continue to monitor patient and adjust therapy as indicated    [address] 8/7 @06:00    Thank you for the consult. Michelle Day, [facility]  8/5/2022 1:40 PM

## 2022-08-05 NOTE — PROGRESS NOTES
atraumatic  NECK: supple, symmetrical, no jugular venous distension and no carotid bruits   HEMATOLOGIC/LYMPHATIC: no cervical, supraclavicular or axillary lymphadenopathy   LUNGS: VBS, no wheezes, no crackles, no rhonchi, no increased work of breathing and clear to auscultation   CARDIOVASCULAR: regular rate and rhythm, normal S1 and S2, no murmur noted  ABDOMEN: normal bowel sound, soft, non-distended, non-tender, no masses palpated, no hepatosplenomgaly   MUSCULOSKELETAL: full range of motion noted, tone is normal  NEUROLOGIC: Motor skills grossly intact. SKIN: + jaundice. Skin appears intact   EXTREMITIES: no LE edema, no cyanosis, no clubbing    LABORATORY RESULTS  CBC:   Recent Labs     08/03/22  1135 08/04/22  1013   WBC 18.1* 20.0*   HGB 16.9* 15.9    117*     BMP:    Recent Labs     08/03/22  1135 08/04/22  1013 08/05/22  0017   * 128* 131*   K 5.1 4.8 4.5   CL 91* 94* 97*   CO2 23 22 20*   BUN 42* 40* 32*   CREATININE 0.6 0.3* 0.5*   GLUCOSE 104* 117* 105*     Hepatic:   Recent Labs     08/03/22  1135 08/04/22  1013 08/05/22  0017   * 691*  691* 636*   * 378*  378* 379*   BILITOT 17.6* 17.8*  17.8* 18.4*   ALKPHOS 3,403* 3,158*  3,158* 3,277*     ASSESSMENT/RECOMMENDATION    1. She has recent diagnosis of metastatic breast cancer with visceral crisis. S/p gamma knife for brain mets on 8/1/2022. CARIS study, genetic counseling were ordered on last OV. Overall prognosis is poor. Family meeting this morning was canceled. I talked to son yesterday over the phone about her poor prognosis and hospice. She requested transfer to [place]. Awaiting for bed. 2. She has reactive leukocytosis. Elevated Hg is due to dehydration. 3. Septic w/u was ordered. On antibiotic coverage. To continue IVF hydration and supportive care. We will follow the patient. Thank you for allowing me to participate in the care of this very pleasant patient.

## 2022-08-05 NOTE — PROGRESS NOTES
V2.0  [facility]ist Progress Note      Name:  Ernesto Boles Senior /Age/Sex: 1950  (70 y.o. female)   MRN & CSN:  [id number] & [id number] Encounter Date/Time: 2022 5:39 PM EDT    [facility] PCP: Landon Sutton Day: 3    Assessment and Plan:   #Severe sepsis in immunocompromised host.  No source of infection identified. CXR, CT abd with no source of infection only neoplasm findings. UA unremarkable. Lactic acid is likely type B 2/2 liver failure from mets. Will continue to treat as severe sepsis until infection is ruled out. Hemodynamically stable. - follow up Blood cultures X2  - contiue vancomycin.   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 S/p chemo/rad, mastectomy. Now has mets to liver with biopsy suggestive of breast primary and mets to brain s/p gamma knife therapy  on steroids taper.  - hem/onc consulted; appreciate recs. - continue home steroids taper  - continue Keppra  - plan as above      #Liver failure 2/2 metastatic liver disease  AST, ALT in the hundreds, bilirubin ~17, alk 3000's, ammonia 75. INR trending upward last one 1.6 .    - continue supportive therapy   - continue lactulose for hepatic encephalopathy   - had U/S 2022 with evidence of liver mets; would hold off repeat U/S   - hepatitis panel  - trend hepatic function panel    #acute metabolic encephalopathy likely 2/2 hepatic from liver mets. Elevated ammonia. - lactulose as above                 #Hx of Essential hypertension  BP stable 100-120. Holding home spironolactone, hydrochlorothiazide given hyperkalemia and sepsis. #Dispo. Family requested to transfer pt to [facility]. Contacted transfer center, received a call from [facility]. Pt was accepted but awaiting service and bed assignment.  Per conversation with [facility], there is a long wait before pt can be accepted. No cancer therapy will be provided until her sepsis is cleared. Also, patient needs to come as outpatient rather than inpatient to establish care for her cancer care. Attempted to call son to discuss [facility] transfer situation; left a VM. Palliative consulted. Dr. Trevor Nissen will be coming tomorrow at [address] [place] [address] hoping to meet family to discuss pt's condition. Diet ADULT DIET; Regular   DVT Prophylaxis [x] Lovenox, []  Heparin, [] SCDs, [] Ambulation,  [] Eliquis, [] Xarelto  [] Coumadin   Code Status Full Code   Disposition From: home  Expected Disposition: family requested transfer to [facility]  Estimated Date of Discharge: TBD  Patient requires continued admission due to sepsis work up   Surrogate Decision Maker/ POA      Subjective:     Chief Complaint: Fatigue (Sent by pcp for possible dehydration)     No acute event overnight. Pt was sleepy but awakens to questions. She asked to speak to her son as she is feeling tired. Earlier this morning, pt wasn't oriented to place and time. Aside from tiredness, pt has no other complaints. Denies lightheadedness, dizziness, fever, night sweats, chills, chest pain, cough, dyspnea, palpitations, abd pain, nausea, vomiting, diarrhea, dysuria. Review of Systems:    Review of Systems    See above    Objective: Intake/Output Summary (Last 24 hours) at 8/5/2022 1708  Last data filed at 8/5/2022 1003  Gross per 24 hour   Intake 0 ml   Output --   Net 0 ml        Vitals:   Vitals:    08/05/22 1603   BP: 136/65   Pulse: 87   Resp: 16   Temp: 97.5 °F (36.4 °C)   SpO2: 94%       Physical Exam:     General: NAD  Eyes: EOMI  ENT: neck supple  Cardiovascular: Regular rate. Respiratory: Clear to auscultation  Gastrointestinal: Soft, non tender  Genitourinary: no suprapubic tenderness  Musculoskeletal: No edema  Skin: warm, dry  Neuro: Alert, slightly lethargic but awakens to questions. Psych: Mood appropriate.      Medications:   Medications:    [START ON 8/6/2022] vancomycin  750 mg IntraVENous Q12H    [START ON 8/6/2022] meropenem  1,000 mg IntraVENous Q8H    lactulose  20 g Oral TID    levETIRAcetam  500 mg Oral BID    pantoprazole  40 mg Oral Daily    sennosides-docusate sodium  2 tablet Oral Daily    [Held by provider] spironolactone-hydroCHLOROthiazide  1 tablet Oral Daily    sodium chloride flush  5-40 mL IntraVENous 2 times per day    enoxaparin  40 mg SubCUTAneous QPM    dexamethasone  4 mg Oral Daily    [START ON 8/8/2022] dexamethasone  2 mg Oral Daily      Infusions:    sodium chloride      sodium chloride 25 mL/hr at 08/05/22 1422     PRN Meds: sodium chloride flush, 5-40 mL, PRN  sodium chloride, , PRN  acetaminophen, 650 mg, Q6H PRN   Or  acetaminophen, 650 mg, Q6H PRN  ondansetron, 4 mg, Q8H PRN   Or  ondansetron, 4 mg, Q6H PRN  polyethylene glycol, 17 g, Daily PRN      Labs      Recent Results (from the past 24 hour(s))   Procalcitonin    Collection Time: 08/04/22  5:55 PM   Result Value Ref Range    Procalcitonin 2.07    Vancomycin Level, Random    Collection Time: 08/05/22 12:17 AM   Result Value Ref Range    Vancomycin Rm 21.2 UG/ML    DOSE AMOUNT DOSE AMT.  GIVEN - 1,250 mg     DOSE TIME DOSE TIME GIVEN - 0000 / 4112    Basic Metabolic Panel w/ Reflex to MG    Collection Time: 08/05/22 12:17 AM   Result Value Ref Range    Sodium 131 (L) 135 - 145 MMOL/L    Potassium 4.5 3.5 - 5.1 MMOL/L    Chloride 97 (L) 99 - 110 mMol/L    CO2 20 (L) 21 - 32 MMOL/L    Anion Gap 14 4 - 16    BUN 32 (H) 6 - 23 MG/DL    Creatinine 0.5 (L) 0.6 - 1.1 MG/DL    Glucose 105 (H) 70 - 99 MG/DL    Calcium 8.9 8.3 - 10.6 MG/DL    GFR Non-African American >60 >60 mL/min/1.73m2    GFR African American >60 >60 mL/min/1.73m2   CBC with Auto Differential    Collection Time: 08/05/22 12:17 AM   Result Value Ref Range    WBC 19.6 (H) 4.0 - 10.5 K/CU MM    RBC 5.05 4.2 - 5.4 M/CU MM    Hemoglobin 15.4 12.5 - 16.0 GM/DL    Hematocrit 44.7 37 - 47 %    MCV 88.5 78 - 100 FL    MCH 30.5 27 - 31 PG MCHC 34.5 32.0 - 36.0 %    RDW 26.1 (H) 11.7 - 14.9 %    Platelets 714 (L) 517 - 440 K/CU MM    Segs Relative 88.0 (H) 36 - 66 %    Lymphocytes % 8.0 (L) 24 - 44 %    Monocytes % 4.0 0 - 4 %    nRBC 2     Segs Absolute 17.2 K/CU MM    Lymphocytes Absolute 1.6 K/CU MM    Monocytes Absolute 0.8 K/CU MM    Differential Type MANUAL DIFFERENTIAL     PLT Morphology FEW    Hepatic Function Panel    Collection Time: 08/05/22 12:17 AM   Result Value Ref Range    Albumin 2.2 (L) 3.4 - 5.0 GM/DL    Total Bilirubin 18.4 (H) 0.0 - 1.0 MG/DL    Bilirubin, Direct 14.5 (H) 0.0 - 0.3 MG/DL    Bilirubin, Indirect 3.9 (H) 0 - 0.7 MG/DL    Alkaline Phosphatase 3,277 (H) 40 - 129 IU/L     (H) 15 - 37 IU/L     (H) 10 - 40 U/L    Total Protein 5.5 (L) 6.4 - 8.2 GM/DL   Hepatitis Panel, Acute    Collection Time: 08/05/22 12:17 AM   Result Value Ref Range    Hepatitis B Surface Ag NON REACTIVE NON REACTIVE    Hep A IgM NON REACTIVE NON REACTIVE    Hep B Core Ab, IgM NON REACTIVE NON REACTIVE    Hepatitis C Ab NON REACTIVE NON REACTIVE   Lactic Acid    Collection Time: 08/05/22 12:17 AM   Result Value Ref Range    Lactate 4.5 (HH) 0.4 - 2.0 mMOL/L        Imaging/Diagnostics Last 24 Hours   CT ABDOMEN PELVIS W IV CONTRAST Additional Contrast? None    Result Date: 8/3/2022  EXAMINATION: CT OF THE ABDOMEN AND PELVIS WITH CONTRAST 8/3/2022 10:53 am TECHNIQUE: CT of the abdomen and pelvis was performed with the administration of intravenous contrast. Multiplanar reformatted images are provided for review. Automated exposure control, iterative reconstruction, and/or weight based adjustment of the mA/kV was utilized to reduce the radiation dose to as low as reasonably achievable. COMPARISON: None. HISTORY: ORDERING SYSTEM PROVIDED HISTORY: biliary obstruction. hx metastatic breast CA TECHNOLOGIST PROVIDED HISTORY: Reason for exam:->biliary obstruction.  hx metastatic breast CA Additional Contrast?->None Decision Support Exception - unselect if not a suspected or confirmed emergency medical condition->Emergency Medical Condition (MA) Reason for Exam: biliary obstruction. hx metastatic breast CA FINDINGS: Lower Chest: A mass within the right middle lobe is partially imaged, measuring at least 4 cm. Innumerable noncalcified nodules are seen within the lower lungs. Example in the right lower lobe measures 9 mm (2, 7). Base of the heart is unremarkable. Left breast prosthesis partially imaged. Visualized extra thoracic soft tissues otherwise unremarkable. . Organs: Innumerable hypodense lesions are seen throughout the liver. Largest conglomerate measures roughly 7 cm (2, 37). Portal vein remains patent. The gallbladder is nondistended. Liver unremarkable. Indeterminate left adrenal nodule measures 1.1 cm (2, 36). Right adrenal gland unremarkable. Pancreas unremarkable. No acute or suspicious renal abnormalities are identified. GI/Bowel: Mild diverticulosis of the large bowel is present without CT evidence of diverticulitis. The large bowel is otherwise unremarkable. The appendix is not well visualized. No asymmetric pericecal inflammation is seen to suggest acute appendicitis. Distal esophagus unremarkable. Stomach unremarkable. Duodenal sweep and the remainder of the small bowel are unremarkable. Pelvis: Fibroid uterus noted. Small amount of free pelvic fluid noted. Urinary bladder unremarkable. Peritoneum/Retroperitoneum: Abdominal aorta normal in caliber. Superior mesenteric artery is enhancing no pathologically large retroperitoneal lymph nodes are seen. There is a small amount of free intra-abdominal fluid, mainly in the perihepatic space and within the pelvis. Bones/Soft Tissues: No acute or suspicious bony abnormalities are identified. Extensive metastatic disease throughout the liver, occupying the majority of the hepatic parenchyma. Mass within the right middle lobe, incompletely evaluated, possibly the primary neoplasm. Numerous nodules are seen within the visualized lungs, compatible with metastatic disease. XR CHEST PORTABLE    Result Date: 8/3/2022  EXAMINATION: ONE XRAY VIEW OF THE CHEST 8/3/2022 12:23 pm COMPARISON: August 27, 2015 HISTORY: ORDERING SYSTEM PROVIDED HISTORY: Shortness of breath TECHNOLOGIST PROVIDED HISTORY: Reason for exam:->dyspnea FINDINGS: Cardiomediastinal silhouette within normal limits. Right basilar rounded opacity concerning for a mass. Numerous bilateral subtle rounded densities present bilaterally. No definite effusion. No pneumothorax or subdiaphragmatic free air. No acute osseous abnormality identified. Rounded right basilar opacity concerning for a mass. Numerous bilateral nodular densities noted bilaterally concerning for metastatic disease. CT of the chest with contrast recommended for further evaluation.        Electronically signed by Nelson Mccarthy MD on 8/5/2022 at 5:08 PM

## 2022-08-06 LAB
ALBUMIN SERPL-MCNC: 2.4 GM/DL (ref 3.4–5)
ALP BLD-CCNC: 3799 IU/L (ref 40–128)
ALT SERPL-CCNC: 455 U/L (ref 10–40)
ANION GAP SERPL CALCULATED.3IONS-SCNC: 13 MMOL/L (ref 4–16)
AST SERPL-CCNC: 799 IU/L (ref 15–37)
BILIRUB SERPL-MCNC: 22.2 MG/DL (ref 0–1)
BUN BLDV-MCNC: 33 MG/DL (ref 6–23)
CALCIUM SERPL-MCNC: 9.5 MG/DL (ref 8.3–10.6)
CHLORIDE BLD-SCNC: 97 MMOL/L (ref 99–110)
CO2: 21 MMOL/L (ref 21–32)
CREAT SERPL-MCNC: 0.3 MG/DL (ref 0.6–1.1)
CULTURE: NORMAL
DIFFERENTIAL TYPE: ABNORMAL
GFR AFRICAN AMERICAN: >60 ML/MIN/1.73M2
GFR NON-AFRICAN AMERICAN: >60 ML/MIN/1.73M2
GLUCOSE BLD-MCNC: 59 MG/DL (ref 70–99)
HCT VFR BLD CALC: 47.4 % (ref 37–47)
HEMOGLOBIN: 16.9 GM/DL (ref 12.5–16)
INR BLD: 1.76 INDEX
LACTATE: 3.1 MMOL/L (ref 0.4–2)
LYMPHOCYTES ABSOLUTE: 1.2 K/CU MM
LYMPHOCYTES RELATIVE PERCENT: 5 % (ref 24–44)
Lab: NORMAL
MCH RBC QN AUTO: 30.1 PG (ref 27–31)
MCHC RBC AUTO-ENTMCNC: 35.7 % (ref 32–36)
MCV RBC AUTO: 84.3 FL (ref 78–100)
METAMYELOCYTES ABSOLUTE COUNT: 0.24 K/CU MM
METAMYELOCYTES PERCENT: 1 %
MONOCYTES ABSOLUTE: 2.7 K/CU MM
MONOCYTES RELATIVE PERCENT: 11 % (ref 0–4)
NUCLEATED RED BLOOD CELLS: 1
PDW BLD-RTO: 26.1 % (ref 11.7–14.9)
PLATELET # BLD: 126 K/CU MM (ref 140–440)
POTASSIUM SERPL-SCNC: 4.8 MMOL/L (ref 3.5–5.1)
PROCALCITONIN: 2
PROTHROMBIN TIME: 22.8 SECONDS (ref 11.7–14.5)
RBC # BLD: 5.62 M/CU MM (ref 4.2–5.4)
SEGMENTED NEUTROPHILS ABSOLUTE COUNT: 20 K/CU MM
SEGMENTED NEUTROPHILS RELATIVE PERCENT: 83 % (ref 36–66)
SODIUM BLD-SCNC: 131 MMOL/L (ref 135–145)
SPECIMEN: NORMAL
TOTAL PROTEIN: 5.7 GM/DL (ref 6.4–8.2)
WBC # BLD: 24.1 K/CU MM (ref 4–10.5)

## 2022-08-06 PROCEDURE — 85027 COMPLETE CBC AUTOMATED: CPT

## 2022-08-06 PROCEDURE — 6360000002 HC RX W HCPCS: Performed by: STUDENT IN AN ORGANIZED HEALTH CARE EDUCATION/TRAINING PROGRAM

## 2022-08-06 PROCEDURE — 94761 N-INVAS EAR/PLS OXIMETRY MLT: CPT

## 2022-08-06 PROCEDURE — 85610 PROTHROMBIN TIME: CPT

## 2022-08-06 PROCEDURE — 1200000000 HC SEMI PRIVATE

## 2022-08-06 PROCEDURE — 2580000003 HC RX 258: Performed by: STUDENT IN AN ORGANIZED HEALTH CARE EDUCATION/TRAINING PROGRAM

## 2022-08-06 PROCEDURE — 99232 SBSQ HOSP IP/OBS MODERATE 35: CPT | Performed by: INTERNAL MEDICINE

## 2022-08-06 PROCEDURE — 6370000000 HC RX 637 (ALT 250 FOR IP): Performed by: STUDENT IN AN ORGANIZED HEALTH CARE EDUCATION/TRAINING PROGRAM

## 2022-08-06 PROCEDURE — 85007 BL SMEAR W/DIFF WBC COUNT: CPT

## 2022-08-06 PROCEDURE — 83605 ASSAY OF LACTIC ACID: CPT

## 2022-08-06 PROCEDURE — 6370000000 HC RX 637 (ALT 250 FOR IP): Performed by: NURSE PRACTITIONER

## 2022-08-06 PROCEDURE — 80053 COMPREHEN METABOLIC PANEL: CPT

## 2022-08-06 PROCEDURE — 76937 US GUIDE VASCULAR ACCESS: CPT

## 2022-08-06 PROCEDURE — 2580000003 HC RX 258: Performed by: NURSE PRACTITIONER

## 2022-08-06 PROCEDURE — 36415 COLL VENOUS BLD VENIPUNCTURE: CPT

## 2022-08-06 PROCEDURE — 6360000002 HC RX W HCPCS: Performed by: NURSE PRACTITIONER

## 2022-08-06 PROCEDURE — 84145 PROCALCITONIN (PCT): CPT

## 2022-08-06 RX ADMIN — SODIUM CHLORIDE, PRESERVATIVE FREE 10 ML: 5 INJECTION INTRAVENOUS at 09:19

## 2022-08-06 RX ADMIN — VANCOMYCIN HYDROCHLORIDE 750 MG: 750 INJECTION, POWDER, LYOPHILIZED, FOR SOLUTION INTRAVENOUS at 02:36

## 2022-08-06 RX ADMIN — DEXAMETHASONE 4 MG: 4 TABLET ORAL at 09:05

## 2022-08-06 RX ADMIN — LACTULOSE 20 G: 10 SOLUTION ORAL at 09:05

## 2022-08-06 RX ADMIN — MEROPENEM 1000 MG: 1 INJECTION, POWDER, FOR SOLUTION INTRAVENOUS at 00:06

## 2022-08-06 RX ADMIN — PANTOPRAZOLE SODIUM 40 MG: 40 TABLET, DELAYED RELEASE ORAL at 09:05

## 2022-08-06 RX ADMIN — MEROPENEM 1000 MG: 1 INJECTION, POWDER, FOR SOLUTION INTRAVENOUS at 09:10

## 2022-08-06 RX ADMIN — MEROPENEM 1000 MG: 1 INJECTION, POWDER, FOR SOLUTION INTRAVENOUS at 23:58

## 2022-08-06 RX ADMIN — LEVETIRACETAM 500 MG: 500 TABLET, FILM COATED ORAL at 09:05

## 2022-08-06 RX ADMIN — SODIUM CHLORIDE, PRESERVATIVE FREE 10 ML: 5 INJECTION INTRAVENOUS at 21:30

## 2022-08-06 RX ADMIN — MEROPENEM 1000 MG: 1 INJECTION, POWDER, FOR SOLUTION INTRAVENOUS at 17:04

## 2022-08-06 RX ADMIN — ENOXAPARIN SODIUM 40 MG: 100 INJECTION SUBCUTANEOUS at 17:05

## 2022-08-06 RX ADMIN — LEVETIRACETAM 500 MG: 500 TABLET, FILM COATED ORAL at 21:30

## 2022-08-06 ASSESSMENT — PAIN SCALES - GENERAL
PAINLEVEL_OUTOF10: 0

## 2022-08-06 NOTE — PROGRESS NOTES
Comprehensive Nutrition Assessment    Type and Reason for Visit:  Initial, Positive Nutrition Screen (Weight Loss, Appetite Loss)    Nutrition Recommendations/Plan:   Modify to Low Sodium Diet   Add fluid restriction per physician if within plan of care   Begin Standard oral nutrition supplement BID   May consider appetite stimulant   Will monitor closely plan of care      Malnutrition Assessment:  Malnutrition Status: At risk for malnutrition (Comment) (08/05/22 0160)    Context:  Chronic Illness     Findings of the 6 clinical characteristics of malnutrition:  Energy Intake:  Mild decrease in energy intake (Comment)  Weight Loss:  Mild weight loss (specify amount and time period)     Body Fat Loss:  Unable to assess     Muscle Mass Loss:  Unable to assess    Fluid Accumulation:  Severe Extremities, Generalized   Strength:  Not Performed    Nutrition Assessment:    Admitted with breast cancer mets to brain and liver, s/p rad/chemotherapy and mastectomy, liver failure. Pt on regular diet, last meal of 0%. No N/V noted. Some wt loss over the last year. +Jaundice. Pt with poor prognosis. Noted pt requested transfer to [facility]. Awaiting for bed. Follow as high nutrition risk. Nutrition Related Findings:    NS @ 75, +2 Pitting Edema on all extremities Wound Type: None       Current Nutrition Intake & Therapies:    Average Meal Intake: 51-75%  Average Supplements Intake: None Ordered  ADULT DIET; Regular    Anthropometric Measures:  Height: 5' 6\" (167.6 cm)  Ideal Body Weight (IBW): 130 lbs (59 kg)    Admission Body Weight: 207 lb (93.9 kg)  Current Body Weight: 207 lb (93.9 kg), 159.2 % IBW. Current BMI (kg/m2): 33.4  Usual Body Weight: 215 lb 9.6 oz (97.8 kg) (8/2021)  % Weight Change (Calculated): -4  Weight Adjustment For: No Adjustment  BMI Categories: Obese Class 1 (BMI 30.0-34. 9)    Estimated Daily Nutrient Needs:  Energy Requirements Based On: Formula  Weight Used for Energy Requirements: Current  Energy (kcal/day): 6680-1345 (Yamileth Deras)  Weight Used for Protein Requirements: Ideal  Protein (g/day): 71-89 (1.2-1.5 g/kg)  Method Used for Fluid Requirements: 1 ml/kcal  Fluid (ml/day): 2000, fluids per MD    Nutrition Diagnosis:   Predicted inadequate energy intake related to catabolic illness, increase demand for energy/nutrients (cancer) as evidenced by intake 0-25%, localized or generalized fluid accumulation, weight loss    Nutrition Interventions:   Food and/or Nutrient Delivery: Modify Current Diet, Start Oral Nutrition Supplement  Nutrition Education/Counseling: No recommendation at this time  Coordination of Nutrition Care: Continue to monitor while inpatient    Goals:  Goals: Meet at least 75% of estimated needs    Nutrition Monitoring and Evaluation:   Behavioral-Environmental Outcomes: None Identified  Food/Nutrient Intake Outcomes: Food and Nutrient Intake, Supplement Intake  Physical Signs/Symptoms Outcomes: Biochemical Data, Fluid Status or Edema, Nausea or Vomiting, Weight, Meal Time Behavior    Discharge Planning:     Too soon to determine     Susi Crowe RD, LD  Contact: [phone]

## 2022-08-06 NOTE — PROGRESS NOTES
ONCOLOGY HEMATOLOGY CARE ([facility])  PROGRESS NOTE      HISTORY OF PRESENT ILLNESS   Burton Wilkinson is a 70 y.o. female who presents generalized weakness and jaundice. She was recently diagnosed with triple negative metastatic breast cancer with visceral involvement and brain mets. S/p gamma knife on 8/1/2022 to the  brain at [facility]  She was seen by Dr Awa Sawant for [address] [facility] placement which was canceled due to generalized weakness and jaundice. She was instructed to go to hospital.    CT abdomen and pelvis 8/3/2022  Extensive metastatic disease throughout the liver, occupying the majority of  the hepatic parenchyma. Mass within the right middle lobe, incompletely evaluated, possibly the  primary neoplasm. Numerous nodules are seen within the visualized lungs, compatible with  metastatic disease. CXR 8/3/2022:  Rounded right basilar opacity concerning for a mass. Numerous bilateral  nodular densities noted bilaterally concerning for metastatic disease. CT of  the chest with contrast recommended for further evaluation. 8/3/22   Albumin: 2.3 (L)  Alk Phos: 3,403 (H)  ALT: 429 (H)  AST: 732 (H)  Bilirubin: 17.6 (H)  Bilirubin, Direct: 13.1 (H)  Total Protein: 7.3    I discussed with her about her poor prognosis. Septic w/u was ordered and she is on ABX. She remains confused. I discussed with son again today about her poor prognosis. Also I mention hospice to the son. Transfer to [facility] was requested by patient. If she were transferred, she would not received chemo treatment and she would be treated for current underlying issue. Due to rapid progression of the cancer including liver failure from cancer, I suggested hospice to son who will discuss with his uncle. They will come in AM to meet me. Palliative has been consulted.     PHYSICAL EXAM    Vitals: /83   Pulse 69   Temp 97.8 °F (36.6 °C) (Oral)   Resp 17   Ht 5' 6\" (1.676 m)   Wt 217 lb 9 oz (98.7 kg)   SpO2 95%   BMI 35.12 kg/m²   CONSTITUTIONAL: awake, confused, no apparent distress   EYES: pupils equal, round. Icteric sclera  and conjunctiva normal  ENT: Normocephalic, without obvious abnormality, atraumatic  NECK: supple, symmetrical, no jugular venous distension and no carotid bruits   HEMATOLOGIC/LYMPHATIC: no cervical, supraclavicular or axillary lymphadenopathy   LUNGS: VBS, no wheezes, no crackles, no rhonchi, no increased work of breathing and clear to auscultation   CARDIOVASCULAR: regular rate and rhythm, normal S1 and S2, no murmur noted  ABDOMEN: normal bowel sound, soft, non-distended, non-tender, no masses palpated, no hepatosplenomegaly   MUSCULOSKELETAL: full range of motion noted, tone is normal  NEUROLOGIC: Motor skills grossly intact. SKIN: + jaundice. Skin appears intact   EXTREMITIES: no LE edema, no cyanosis, no clubbing    LABORATORY RESULTS  CBC:   Recent Labs     08/03/22  1135 08/04/22  1013 08/05/22  0017   WBC 18.1* 20.0* 19.6*   HGB 16.9* 15.9 15.4    117* 124*     BMP:    Recent Labs     08/03/22  1135 08/04/22  1013 08/05/22  0017   * 128* 131*   K 5.1 4.8 4.5   CL 91* 94* 97*   CO2 23 22 20*   BUN 42* 40* 32*   CREATININE 0.6 0.3* 0.5*   GLUCOSE 104* 117* 105*     Hepatic:   Recent Labs     08/03/22  1135 08/04/22  1013 08/05/22  0017   * 691*  691* 636*   * 378*  378* 379*   BILITOT 17.6* 17.8*  17.8* 18.4*   ALKPHOS 3,403* 3,158*  3,158* 3,277*     ASSESSMENT/RECOMMENDATION    1. She has recent diagnosis of metastatic breast cancer with visceral crisis. S/p gamma knife for brain mets on 8/1/2022. CARIS study, genetic counseling were ordered on last OV. She requested transfer to [address] [place] [address]. No bed available. Due to liver failure, chemo therapy may accelerate her demise. Discussed with son again this morning. We will have meeting in AM.  Palliative has been consulted. 2. She has reactive leukocytosis. Elevated Hg is due to dehydration.     To continue IVF hydration and supportive care. Discussed with hospitalist.    Overall prognosis is poor. We will follow the patient. Thank you for allowing me to participate in the care of this very pleasant patient.

## 2022-08-06 NOTE — PLAN OF CARE
Problem: Discharge Planning  Goal: Discharge to home or other facility with appropriate resources  Outcome: Progressing     Problem: Safety - Adult  Goal: Free from fall injury  Outcome: Progressing  Flowsheets (Taken 8/6/2022 0026)  Free From Fall Injury: Instruct family/caregiver on patient safety     Problem: Skin/Tissue Integrity  Goal: Absence of new skin breakdown  Description: 1. Monitor for areas of redness and/or skin breakdown  2. Assess vascular access sites hourly  3. Every 4-6 hours minimum:  Change oxygen saturation probe site  4. Every 4-6 hours:  If on nasal continuous positive airway pressure, respiratory therapy assess nares and determine need for appliance change or resting period.   Outcome: Progressing     Problem: Nutrition Deficit:  Goal: Optimize nutritional status  Outcome: Progressing

## 2022-08-06 NOTE — PROGRESS NOTES
V2.0  [facility] [place] Hospitalist Progress Note      Name:  [facility]OD [facility], [facility]-INTENSIVE SERVICES Senior /Age/Sex: 1950  (70 y.o. female)   MRN & CSN:  [id number] & [id number] Encounter Date/Time: 2022 5:39 PM EDT    Location:  [address] [place] [address]-A PCP: Landon Sutton Day: 4    Assessment and Plan:   #suspected severe sepsis in immunocompromised host.  No source of infection identified. CXR, CT abd with no source of infection only neoplasm findings. UA unremarkable. Leukocytosis could be explained by steroids pt on. Lactic acidosis is type B due to liver failure. With no source of infection identified, severe sepsis diagnsois is in question. Afebrile and hemodynamically stable. 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 S/p chemo/rad, mastectomy. Now has mets to liver with biopsy suggestive of breast primary and mets to brain s/p gamma knife therapy  on steroids taper.  - hem/onc consulted; appreciate recs. - continue home steroids taper  - continue Keppra  - plan as above      #Liver failure 2/2 metastatic liver disease  AST, ALT in the hundreds, bilirubin ~17, alk 3000's, ammonia 75. INR trending upward last one 1.6 .    - continue supportive therapy   - continue lactulose for hepatic encephalopathy   - had U/S 2022 with evidence of liver mets; would hold off repeat U/S   - hepatitis panel  - trend hepatic function panel    #acute metabolic encephalopathy likely 2/2 hepatic from liver mets. Elevated ammonia. - lactulose as above                 #Hx of Essential hypertension  BP stable 100-120. Holding home spironolactone, hydrochlorothiazide given hyperkalemia and sepsis. #Dispo. Family requested to transfer pt to [facility]. Contacted transfer center, received a call from [facility].  Pt was accepted but awaiting service and bed assignment. Per conversation with [facility], there is a long wait before pt can be accepted. No cancer therapy will be provided until her sepsis is cleared. Also, patient needs to come as outpatient rather than inpatient to establish care for her cancer care. Attempted to call son to discuss [facility] transfer situation; left a VM. Palliative and hospice consulted. Spoke to brother. Family and patient are considering hospice. Diet ADULT DIET; Regular; Low Sodium (2 gm)  ADULT ORAL NUTRITION SUPPLEMENT; Breakfast, Lunch; Standard High Calorie/High Protein Oral Supplement   DVT Prophylaxis [x] Lovenox, []  Heparin, [] SCDs, [] Ambulation,  [] Eliquis, [] Xarelto  [] Coumadin   Code Status Full Code   Disposition From: home  Expected Disposition: [facility] home w hospice  Estimated Date of Discharge: likely tomorrow  Patient requires continued admission due to sepsis work up   Surrogate Decision Maker/ POA      Subjective:     Chief Complaint: Fatigue (Sent by pcp for possible dehydration)     No acute event overnight. Afebrile past 24h, hemodynamically stable. Today, pt was more awake, oriented. She was tearful. She expressed wishes to go home. She is thinking about hospice. Aside from tiredness, pt has no other complaints. Denies lightheadedness, dizziness, fever, night sweats, chills, chest pain, cough, dyspnea, palpitations, abd pain, nausea, vomiting, diarrhea, dysuria. Review of Systems:    Review of Systems    See above    Objective:   No intake or output data in the 24 hours ending 08/06/22 1413     Vitals:   Vitals:    08/06/22 0915   BP: 122/83   Pulse: 69   Resp: 17   Temp: 97.8 °F (36.6 °C)   SpO2: 95%       Physical Exam:     General: NAD  Eyes: EOMI  ENT: neck supple  Cardiovascular: Regular rate. Respiratory: Clear to auscultation  Gastrointestinal: Soft, non tender  Genitourinary: no suprapubic tenderness  Musculoskeletal: No edema  Skin: warm, dry  Neuro: Alert, awake  Psych: Mood appropriate. Medications:   Medications:    meropenem  1,000 mg IntraVENous Q8H    lactulose  20 g Oral TID    levETIRAcetam  500 mg Oral BID    pantoprazole  40 mg Oral Daily    sennosides-docusate sodium  2 tablet Oral Daily    [Held by provider] spironolactone-hydroCHLOROthiazide  1 tablet Oral Daily    sodium chloride flush  5-40 mL IntraVENous 2 times per day    enoxaparin  40 mg SubCUTAneous QPM    dexamethasone  4 mg Oral Daily    [START ON 8/8/2022] dexamethasone  2 mg Oral Daily      Infusions:    sodium chloride 25 mL/hr at 08/05/22 1422     PRN Meds: ipratropium-albuterol, 1 ampule, Q4H PRN  sodium chloride flush, 5-40 mL, PRN  sodium chloride, , PRN  acetaminophen, 650 mg, Q6H PRN   Or  acetaminophen, 650 mg, Q6H PRN  ondansetron, 4 mg, Q8H PRN   Or  ondansetron, 4 mg, Q6H PRN  polyethylene glycol, 17 g, Daily PRN      Labs      Recent Results (from the past 24 hour(s))   Comprehensive Metabolic Panel    Collection Time: 08/06/22 12:33 PM   Result Value Ref Range    Sodium 131 (L) 135 - 145 MMOL/L    Potassium 4.8 3.5 - 5.1 MMOL/L    Chloride 97 (L) 99 - 110 mMol/L    CO2 21 21 - 32 MMOL/L    BUN 33 (H) 6 - 23 MG/DL    Creatinine 0.3 (L) 0.6 - 1.1 MG/DL    Glucose 59 (L) 70 - 99 MG/DL    Calcium 9.5 8.3 - 10.6 MG/DL    Albumin 2.4 (L) 3.4 - 5.0 GM/DL    Total Protein 5.7 (L) 6.4 - 8.2 GM/DL    Total Bilirubin 22.2 (HH) 0.0 - 1.0 MG/DL     (H) 10 - 40 U/L     (H) 15 - 37 IU/L    Alkaline Phosphatase 3,799 (H) 40 - 128 IU/L    GFR Non-African American >60 >60 mL/min/1.73m2    GFR African American >60 >60 mL/min/1.73m2    Anion Gap 13 4 - 16   CBC with Auto Differential    Collection Time: 08/06/22 12:33 PM   Result Value Ref Range    WBC 24.1 (H) 4.0 - 10.5 K/CU MM    RBC 5.62 (H) 4.2 - 5.4 M/CU MM    Hemoglobin 16.9 (H) 12.5 - 16.0 GM/DL    Hematocrit 47.4 (H) 37 - 47 %    MCV 84.3 78 - 100 FL    MCH 30.1 27 - 31 PG    MCHC 35.7 32.0 - 36.0 %    RDW 26.1 (H) 11.7 - 14.9 %    Platelets 117 (L) 140 - 440 K/CU MM    Metamyelocytes Relative 1 (H) 0.0 %    Segs Relative 83.0 (H) 36 - 66 %    Lymphocytes % 5.0 (L) 24 - 44 %    Monocytes % 11.0 (H) 0 - 4 %    nRBC 1     Metamyelocytes Absolute 0.24 K/CU MM    Segs Absolute 20.0 K/CU MM    Lymphocytes Absolute 1.2 K/CU MM    Monocytes Absolute 2.7 K/CU MM    Differential Type MANUAL DIFFERENTIAL    Lactic Acid    Collection Time: 08/06/22 12:33 PM   Result Value Ref Range    Lactate 3.1 (HH) 0.4 - 2.0 mMOL/L   Protime-INR    Collection Time: 08/06/22 12:33 PM   Result Value Ref Range    Protime 22.8 (H) 11.7 - 14.5 SECONDS    INR 1.76 INDEX   Procalcitonin    Collection Time: 08/06/22 12:33 PM   Result Value Ref Range    Procalcitonin 2.00         Imaging/Diagnostics Last 24 Hours   CT ABDOMEN PELVIS W IV CONTRAST Additional Contrast? None    Result Date: 8/3/2022  EXAMINATION: CT OF THE ABDOMEN AND PELVIS WITH CONTRAST 8/3/2022 10:53 am TECHNIQUE: CT of the abdomen and pelvis was performed with the administration of intravenous contrast. Multiplanar reformatted images are provided for review. Automated exposure control, iterative reconstruction, and/or weight based adjustment of the mA/kV was utilized to reduce the radiation dose to as low as reasonably achievable. COMPARISON: None. HISTORY: ORDERING SYSTEM PROVIDED HISTORY: biliary obstruction. hx metastatic breast CA TECHNOLOGIST PROVIDED HISTORY: Reason for exam:->biliary obstruction. hx metastatic breast CA Additional Contrast?->None Decision Support Exception - unselect if not a suspected or confirmed emergency medical condition->Emergency Medical Condition (MA) Reason for Exam: biliary obstruction. hx metastatic breast CA FINDINGS: Lower Chest: A mass within the right middle lobe is partially imaged, measuring at least 4 cm. Innumerable noncalcified nodules are seen within the lower lungs. Example in the right lower lobe measures 9 mm (2, 7). Base of the heart is unremarkable.   Left breast prosthesis partially imaged. Visualized extra thoracic soft tissues otherwise unremarkable. . Organs: Innumerable hypodense lesions are seen throughout the liver. Largest conglomerate measures roughly 7 cm (2, 37). Portal vein remains patent. The gallbladder is nondistended. Liver unremarkable. Indeterminate left adrenal nodule measures 1.1 cm (2, 36). Right adrenal gland unremarkable. Pancreas unremarkable. No acute or suspicious renal abnormalities are identified. GI/Bowel: Mild diverticulosis of the large bowel is present without CT evidence of diverticulitis. The large bowel is otherwise unremarkable. The appendix is not well visualized. No asymmetric pericecal inflammation is seen to suggest acute appendicitis. Distal esophagus unremarkable. Stomach unremarkable. Duodenal sweep and the remainder of the small bowel are unremarkable. Pelvis: Fibroid uterus noted. Small amount of free pelvic fluid noted. Urinary bladder unremarkable. Peritoneum/Retroperitoneum: Abdominal aorta normal in caliber. Superior mesenteric artery is enhancing no pathologically large retroperitoneal lymph nodes are seen. There is a small amount of free intra-abdominal fluid, mainly in the perihepatic space and within the pelvis. Bones/Soft Tissues: No acute or suspicious bony abnormalities are identified. Extensive metastatic disease throughout the liver, occupying the majority of the hepatic parenchyma. Mass within the right middle lobe, incompletely evaluated, possibly the primary neoplasm. Numerous nodules are seen within the visualized lungs, compatible with metastatic disease. XR CHEST PORTABLE    Result Date: 8/3/2022  EXAMINATION: ONE XRAY VIEW OF THE CHEST 8/3/2022 12:23 pm COMPARISON: August 27, 2015 HISTORY: ORDERING SYSTEM PROVIDED HISTORY: Shortness of breath TECHNOLOGIST PROVIDED HISTORY: Reason for exam:->dyspnea FINDINGS: Cardiomediastinal silhouette within normal limits.   Right basilar rounded opacity concerning for a mass. Numerous bilateral subtle rounded densities present bilaterally. No definite effusion. No pneumothorax or subdiaphragmatic free air. No acute osseous abnormality identified. Rounded right basilar opacity concerning for a mass. Numerous bilateral nodular densities noted bilaterally concerning for metastatic disease. CT of the chest with contrast recommended for further evaluation.        Electronically signed by Carlos Le MD on 8/6/2022 at 2:13 PM

## 2022-08-06 NOTE — PLAN OF CARE
Problem: Discharge Planning  Goal: Discharge to home or other facility with appropriate resources  8/6/2022 1136 by Faustina Lux LPN  Outcome: Progressing  8/6/2022 0026 by Chris Chavez RN  Outcome: Progressing     Problem: Safety - Adult  Goal: Free from fall injury  8/6/2022 1136 by Faustina Lux LPN  Outcome: Progressing  8/6/2022 0026 by Chris Chavez RN  Outcome: Progressing  Flowsheets (Taken 8/6/2022 0026)  Free From Fall Injury: Instruct family/caregiver on patient safety     Problem: Skin/Tissue Integrity  Goal: Absence of new skin breakdown  Description: 1. Monitor for areas of redness and/or skin breakdown  2. Assess vascular access sites hourly  3. Every 4-6 hours minimum:  Change oxygen saturation probe site  4. Every 4-6 hours:  If on nasal continuous positive airway pressure, respiratory therapy assess nares and determine need for appliance change or resting period.   8/6/2022 1136 by Faustina Lux LPN  Outcome: Progressing  8/6/2022 0026 by Chris Chavez RN  Outcome: Progressing     Problem: Nutrition Deficit:  Goal: Optimize nutritional status  8/6/2022 1136 by Faustina Lux LPN  Outcome: Progressing  8/6/2022 0026 by Chris Chavez RN  Outcome: Progressing

## 2022-08-06 NOTE — PROGRESS NOTES
PS hospitallist:  Patient has fluids NS running at 75 with generalized BUE and BLE pitting edema of +2. She also has some expiratory wheeze noted upon assessment. Neither the edema nor the wheezes are noted prior. Can we stop fluids for the time being?   Waiting response and will continue to monitor

## 2022-08-07 LAB
ALBUMIN SERPL-MCNC: 2 GM/DL (ref 3.4–5)
ALP BLD-CCNC: 3498 IU/L (ref 40–129)
ALT SERPL-CCNC: 439 U/L (ref 10–40)
ANION GAP SERPL CALCULATED.3IONS-SCNC: 10 MMOL/L (ref 4–16)
AST SERPL-CCNC: 834 IU/L (ref 15–37)
BASOPHILS ABSOLUTE: 0 K/CU MM
BASOPHILS RELATIVE PERCENT: 0.1 % (ref 0–1)
BILIRUB SERPL-MCNC: 20.6 MG/DL (ref 0–1)
BILIRUBIN DIRECT: 15.3 MG/DL (ref 0–0.3)
BILIRUBIN, INDIRECT: 5.3 MG/DL (ref 0–0.7)
BUN BLDV-MCNC: 36 MG/DL (ref 6–23)
CALCIUM SERPL-MCNC: 9.4 MG/DL (ref 8.3–10.6)
CHLORIDE BLD-SCNC: 98 MMOL/L (ref 99–110)
CO2: 22 MMOL/L (ref 21–32)
CREAT SERPL-MCNC: 0.3 MG/DL (ref 0.6–1.1)
DIFFERENTIAL TYPE: ABNORMAL
EOSINOPHILS ABSOLUTE: 0 K/CU MM
EOSINOPHILS RELATIVE PERCENT: 0 % (ref 0–3)
GFR AFRICAN AMERICAN: >60 ML/MIN/1.73M2
GFR NON-AFRICAN AMERICAN: >60 ML/MIN/1.73M2
GLUCOSE BLD-MCNC: 56 MG/DL (ref 70–99)
GLUCOSE BLD-MCNC: 60 MG/DL (ref 70–99)
GLUCOSE BLD-MCNC: 61 MG/DL (ref 70–99)
GLUCOSE BLD-MCNC: 74 MG/DL (ref 70–99)
GLUCOSE BLD-MCNC: 75 MG/DL (ref 70–99)
GLUCOSE BLD-MCNC: 89 MG/DL (ref 70–99)
GLUCOSE BLD-MCNC: 98 MG/DL (ref 70–99)
HCT VFR BLD CALC: 43.9 % (ref 37–47)
HEMOGLOBIN: 15.6 GM/DL (ref 12.5–16)
IMMATURE NEUTROPHIL %: 3.3 % (ref 0–0.43)
LYMPHOCYTES ABSOLUTE: 0.7 K/CU MM
LYMPHOCYTES RELATIVE PERCENT: 3.5 % (ref 24–44)
MCH RBC QN AUTO: 30.2 PG (ref 27–31)
MCHC RBC AUTO-ENTMCNC: 35.5 % (ref 32–36)
MCV RBC AUTO: 84.9 FL (ref 78–100)
MONOCYTES ABSOLUTE: 1.5 K/CU MM
MONOCYTES RELATIVE PERCENT: 7 % (ref 0–4)
NUCLEATED RBC %: 0.5 %
PDW BLD-RTO: 26.7 % (ref 11.7–14.9)
PLATELET # BLD: 80 K/CU MM (ref 140–440)
POTASSIUM SERPL-SCNC: 5.1 MMOL/L (ref 3.5–5.1)
RBC # BLD: 5.17 M/CU MM (ref 4.2–5.4)
SEGMENTED NEUTROPHILS ABSOLUTE COUNT: 18.2 K/CU MM
SEGMENTED NEUTROPHILS RELATIVE PERCENT: 86.1 % (ref 36–66)
SODIUM BLD-SCNC: 130 MMOL/L (ref 135–145)
TOTAL IMMATURE NEUTOROPHIL: 0.69 K/CU MM
TOTAL NUCLEATED RBC: 0.1 K/CU MM
TOTAL PROTEIN: 5.1 GM/DL (ref 6.4–8.2)
TOTAL RETICULOCYTE COUNT: 0.24 K/CU MM
WBC # BLD: 21.1 K/CU MM (ref 4–10.5)

## 2022-08-07 PROCEDURE — 97166 OT EVAL MOD COMPLEX 45 MIN: CPT

## 2022-08-07 PROCEDURE — 6360000002 HC RX W HCPCS: Performed by: STUDENT IN AN ORGANIZED HEALTH CARE EDUCATION/TRAINING PROGRAM

## 2022-08-07 PROCEDURE — 6370000000 HC RX 637 (ALT 250 FOR IP): Performed by: NURSE PRACTITIONER

## 2022-08-07 PROCEDURE — 36415 COLL VENOUS BLD VENIPUNCTURE: CPT

## 2022-08-07 PROCEDURE — 1200000000 HC SEMI PRIVATE

## 2022-08-07 PROCEDURE — 85025 COMPLETE CBC W/AUTO DIFF WBC: CPT

## 2022-08-07 PROCEDURE — 99232 SBSQ HOSP IP/OBS MODERATE 35: CPT | Performed by: INTERNAL MEDICINE

## 2022-08-07 PROCEDURE — 2580000003 HC RX 258: Performed by: STUDENT IN AN ORGANIZED HEALTH CARE EDUCATION/TRAINING PROGRAM

## 2022-08-07 PROCEDURE — 6370000000 HC RX 637 (ALT 250 FOR IP): Performed by: STUDENT IN AN ORGANIZED HEALTH CARE EDUCATION/TRAINING PROGRAM

## 2022-08-07 PROCEDURE — 6360000002 HC RX W HCPCS: Performed by: NURSE PRACTITIONER

## 2022-08-07 PROCEDURE — 97535 SELF CARE MNGMENT TRAINING: CPT

## 2022-08-07 PROCEDURE — 80053 COMPREHEN METABOLIC PANEL: CPT

## 2022-08-07 PROCEDURE — 2580000003 HC RX 258: Performed by: NURSE PRACTITIONER

## 2022-08-07 PROCEDURE — 82962 GLUCOSE BLOOD TEST: CPT

## 2022-08-07 PROCEDURE — 82248 BILIRUBIN DIRECT: CPT

## 2022-08-07 PROCEDURE — 94761 N-INVAS EAR/PLS OXIMETRY MLT: CPT

## 2022-08-07 RX ADMIN — SODIUM CHLORIDE, PRESERVATIVE FREE 10 ML: 5 INJECTION INTRAVENOUS at 10:01

## 2022-08-07 RX ADMIN — PANTOPRAZOLE SODIUM 40 MG: 40 TABLET, DELAYED RELEASE ORAL at 10:01

## 2022-08-07 RX ADMIN — SENNOSIDES AND DOCUSATE SODIUM 2 TABLET: 50; 8.6 TABLET ORAL at 10:00

## 2022-08-07 RX ADMIN — LEVETIRACETAM 500 MG: 500 TABLET, FILM COATED ORAL at 10:00

## 2022-08-07 RX ADMIN — DEXAMETHASONE 4 MG: 4 TABLET ORAL at 10:00

## 2022-08-07 RX ADMIN — LEVETIRACETAM 500 MG: 500 TABLET, FILM COATED ORAL at 22:39

## 2022-08-07 RX ADMIN — MEROPENEM 1000 MG: 1 INJECTION, POWDER, FOR SOLUTION INTRAVENOUS at 10:31

## 2022-08-07 RX ADMIN — SODIUM CHLORIDE, PRESERVATIVE FREE 10 ML: 5 INJECTION INTRAVENOUS at 22:39

## 2022-08-07 NOTE — PROGRESS NOTES
Patients blood glucose this morning was 60, gave patient 120 ml of orange juice, checked glucose 15 minutes later and glucose was 74. Gave patient another 60 ml of orange juice, checked glucose 20 minutes later, glucose came up to 98. Patient was asymptomatic, alert and oriented. No injuries noted, will continue to monitor.

## 2022-08-07 NOTE — PROGRESS NOTES
V2.0  [facility] [place] Hospitalist Progress Note      Name:  [place] Senior /Age/Sex: 1950  (70 y.o. female)   MRN & CSN:  [id number] & [id number] Encounter Date/Time: 2022 5:39 PM EDT    Location:  [address] [place] [address] PCP: Landon Sutton Day: 5    Assessment and Plan:   #suspected severe sepsis in immunocompromised host.  No source of infection identified. Afebrile and hemodynamically stable. 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 S/p chemo/rad, mastectomy. Now has mets to liver with biopsy suggestive of breast primary and mets to brain s/p gamma knife therapy  on steroids taper.   - hem/onc consulted;   - continue home steroids taper  - continue Keppra  - plan as above      #Liver failure 2/2 metastatic liver disease  AST, ALT in the hundreds, bilirubin ~17, alk 3000's, ammonia 75. INR trending upward last one 1.6 . Now having hypoglycemia episodes   - continue supportive therapy   - continue lactulose for hepatic encephalopathy   - had U/S 2022 with evidence of liver mets; would hold off repeat U/S  - stop trending labs per pt and family wishes    #acute metabolic encephalopathy likely 2/2 hepatic from liver mets. Elevated ammonia. - lactulose as above                 #Hx of Essential hypertension  BP stable 100-120. Holding home spironolactone, hydrochlorothiazide     #Dispo. Pt and family have decided to go for hospice. Await hospice evaluation and acceptance. Plan to discharge home with hospice. Code status changed to Limitedx4 per pt and son. Diet ADULT DIET; Regular;  Low Sodium (2 gm)  ADULT ORAL NUTRITION SUPPLEMENT; Breakfast, Lunch; Standard High Calorie/High Protein Oral Supplement   DVT Prophylaxis [x] Lovenox, []  Heparin, [] SCDs, [] Ambulation, [] Eliquis, [] Xarelto  [] Coumadin   Code Status Limited   Disposition From: home  Expected Disposition: home w hospice  Estimated Date of Discharge: likely tomorrow  Patient requires continued admission due to hospice evaluation   Surrogate Decision Maker/ POA      Subjective:     Chief Complaint: Fatigue (Sent by pcp for possible dehydration)     No acute event overnight. Afebrile past 24h, hemodynamically stable. Today, pt was less awake and more confused. She was asking to be left alone . Patient was also asking to go home . Discussed CODE STATUS with her and son ; she asked for no recitation including chest compressions shocking intubation and any medication . Discussed with son agreed to stop antibiotics and lab work except for glucose checks, steroids, lactulose and Keppra . Unable to obtain full ROS. Review of Systems:    Review of Systems    See above    Objective:   No intake or output data in the 24 hours ending 08/07/22 1521     Vitals:   Vitals:    08/07/22 1446   BP: 95/68   Pulse: 85   Resp:    Temp: 97.5 °F (36.4 °C)   SpO2: 95%       Physical Exam:     General: NAD  Eyes: EOMI  ENT: neck supple  Cardiovascular: Regular rate. Respiratory: Clear to auscultation  Gastrointestinal: Soft, non tender  Genitourinary: no suprapubic tenderness  Musculoskeletal: No edema  Skin: warm, dry  Neuro: Alert, lethargic  Psych: Mood appropriate.      Medications:   Medications:    lactulose  20 g Oral TID    levETIRAcetam  500 mg Oral BID    pantoprazole  40 mg Oral Daily    sennosides-docusate sodium  2 tablet Oral Daily    [Held by provider] spironolactone-hydroCHLOROthiazide  1 tablet Oral Daily    sodium chloride flush  5-40 mL IntraVENous 2 times per day    enoxaparin  40 mg SubCUTAneous QPM    [START ON 8/8/2022] dexamethasone  2 mg Oral Daily      Infusions:    sodium chloride 25 mL/hr at 08/05/22 1422     PRN Meds: ipratropium-albuterol, 1 ampule, Q4H PRN  sodium chloride flush, 5-40 mL, PRN  sodium chloride, , PRN  acetaminophen, 650 mg, Q6H PRN   Or  acetaminophen, 650 mg, Q6H PRN  ondansetron, 4 mg, Q8H PRN   Or  ondansetron, 4 mg, Q6H PRN  polyethylene glycol, 17 g, Daily PRN      Labs      Recent Results (from the past 24 hour(s))   POCT Glucose    Collection Time: 08/07/22  5:48 AM   Result Value Ref Range    POC Glucose 60 (L) 70 - 99 MG/DL   POCT Glucose    Collection Time: 08/07/22  6:20 AM   Result Value Ref Range    POC Glucose 74 70 - 99 MG/DL   POCT Glucose    Collection Time: 08/07/22  7:43 AM   Result Value Ref Range    POC Glucose 98 70 - 99 MG/DL   CBC with Auto Differential    Collection Time: 08/07/22 12:17 PM   Result Value Ref Range    WBC 21.1 (H) 4.0 - 10.5 K/CU MM    RBC 5.17 4.2 - 5.4 M/CU MM    Hemoglobin 15.6 12.5 - 16.0 GM/DL    Hematocrit 43.9 37 - 47 %    MCV 84.9 78 - 100 FL    MCH 30.2 27 - 31 PG    MCHC 35.5 32.0 - 36.0 %    RDW 26.7 (H) 11.7 - 14.9 %    Platelets 80 (L) 882 - 440 K/CU MM    Differential Type AUTOMATED DIFFERENTIAL     Segs Relative 86.1 (H) 36 - 66 %    Lymphocytes % 3.5 (L) 24 - 44 %    Monocytes % 7.0 (H) 0 - 4 %    Eosinophils % 0.0 0 - 3 %    Basophils % 0.1 0 - 1 %    Segs Absolute 18.2 K/CU MM    Lymphocytes Absolute 0.7 K/CU MM    Monocytes Absolute 1.5 K/CU MM    Eosinophils Absolute 0.0 K/CU MM    Basophils Absolute 0.0 K/CU MM    Nucleated RBC % 0.5 %    Total Nucleated RBC 0.1 K/CU MM    TRC 0.2435 K/CU MM    Total Immature Neutrophil 0.69 K/CU MM    Immature Neutrophil % 3.3 (H) 0 - 0.43 %   Basic Metabolic Panel w/ Reflex to MG    Collection Time: 08/07/22 12:17 PM   Result Value Ref Range    Sodium 130 (L) 135 - 145 MMOL/L    Potassium 5.1 3.5 - 5.1 MMOL/L    Chloride 98 (L) 99 - 110 mMol/L    CO2 22 21 - 32 MMOL/L    Anion Gap 10 4 - 16    BUN 36 (H) 6 - 23 MG/DL    Creatinine 0.3 (L) 0.6 - 1.1 MG/DL    Glucose 75 70 - 99 MG/DL    Calcium 9.4 8.3 - 10.6 MG/DL    GFR Non-African American >60 >60 mL/min/1.73m2    GFR African American >60 >60 mL/min/1.73m2   Hepatic Function Panel    Collection Time: 08/07/22 12:17 PM   Result Value Ref Range    Albumin 2.0 (L) 3.4 - 5.0 GM/DL    Total Bilirubin 20.6 (H) 0.0 - 1.0 MG/DL    Bilirubin, Direct 15.3 (H) 0.0 - 0.3 MG/DL    Bilirubin, Indirect 5.3 (H) 0 - 0.7 MG/DL    Alkaline Phosphatase 3,498 (H) 40 - 129 IU/L     (H) 15 - 37 IU/L     (H) 10 - 40 U/L    Total Protein 5.1 (L) 6.4 - 8.2 GM/DL        Imaging/Diagnostics Last 24 Hours   CT ABDOMEN PELVIS W IV CONTRAST Additional Contrast? None    Result Date: 8/3/2022  EXAMINATION: CT OF THE ABDOMEN AND PELVIS WITH CONTRAST 8/3/2022 10:53 am TECHNIQUE: CT of the abdomen and pelvis was performed with the administration of intravenous contrast. Multiplanar reformatted images are provided for review. Automated exposure control, iterative reconstruction, and/or weight based adjustment of the mA/kV was utilized to reduce the radiation dose to as low as reasonably achievable. COMPARISON: None. HISTORY: ORDERING SYSTEM PROVIDED HISTORY: biliary obstruction. hx metastatic breast CA TECHNOLOGIST PROVIDED HISTORY: Reason for exam:->biliary obstruction. hx metastatic breast CA Additional Contrast?->None Decision Support Exception - unselect if not a suspected or confirmed emergency medical condition->Emergency Medical Condition (MA) Reason for Exam: biliary obstruction. hx metastatic breast CA FINDINGS: Lower Chest: A mass within the right middle lobe is partially imaged, measuring at least 4 cm. Innumerable noncalcified nodules are seen within the lower lungs. Example in the right lower lobe measures 9 mm (2, 7). Base of the heart is unremarkable. Left breast prosthesis partially imaged. Visualized extra thoracic soft tissues otherwise unremarkable. . Organs: Innumerable hypodense lesions are seen throughout the liver. Largest conglomerate measures roughly 7 cm (2, 37). Portal vein remains patent. The gallbladder is nondistended.  Liver unremarkable. Indeterminate left adrenal nodule measures 1.1 cm (2, 36). Right adrenal gland unremarkable. Pancreas unremarkable. No acute or suspicious renal abnormalities are identified. GI/Bowel: Mild diverticulosis of the large bowel is present without CT evidence of diverticulitis. The large bowel is otherwise unremarkable. The appendix is not well visualized. No asymmetric pericecal inflammation is seen to suggest acute appendicitis. Distal esophagus unremarkable. Stomach unremarkable. Duodenal sweep and the remainder of the small bowel are unremarkable. Pelvis: Fibroid uterus noted. Small amount of free pelvic fluid noted. Urinary bladder unremarkable. Peritoneum/Retroperitoneum: Abdominal aorta normal in caliber. Superior mesenteric artery is enhancing no pathologically large retroperitoneal lymph nodes are seen. There is a small amount of free intra-abdominal fluid, mainly in the perihepatic space and within the pelvis. Bones/Soft Tissues: No acute or suspicious bony abnormalities are identified. Extensive metastatic disease throughout the liver, occupying the majority of the hepatic parenchyma. Mass within the right middle lobe, incompletely evaluated, possibly the primary neoplasm. Numerous nodules are seen within the visualized lungs, compatible with metastatic disease. XR CHEST PORTABLE    Result Date: 8/3/2022  EXAMINATION: ONE XRAY VIEW OF THE CHEST 8/3/2022 12:23 pm COMPARISON: August 27, 2015 HISTORY: ORDERING SYSTEM PROVIDED HISTORY: Shortness of breath TECHNOLOGIST PROVIDED HISTORY: Reason for exam:->dyspnea FINDINGS: Cardiomediastinal silhouette within normal limits. Right basilar rounded opacity concerning for a mass. Numerous bilateral subtle rounded densities present bilaterally. No definite effusion. No pneumothorax or subdiaphragmatic free air. No acute osseous abnormality identified. Rounded right basilar opacity concerning for a mass.   Numerous bilateral nodular densities noted bilaterally concerning for metastatic disease. CT of the chest with contrast recommended for further evaluation.        Electronically signed by Jenni Kaye MD on 8/7/2022 at 3:21 PM

## 2022-08-07 NOTE — PROGRESS NOTES
ONCOLOGY HEMATOLOGY CARE ([facility])  PROGRESS NOTE      HISTORY OF PRESENT ILLNESS   Burton Wilkinson is a 70 y.o. female who presents generalized weakness and jaundice. She was recently diagnosed with triple negative metastatic breast cancer with visceral involvement and brain mets. S/p gamma knife on 8/1/2022 to the  brain at [facility]  She was seen by Dr Awa Sawant for [address] At [facility] placement which was canceled due to generalized weakness and jaundice. She was instructed to go to hospital.    CT abdomen and pelvis 8/3/2022  Extensive metastatic disease throughout the liver, occupying the majority of  the hepatic parenchyma. Mass within the right middle lobe, incompletely evaluated, possibly the  primary neoplasm. Numerous nodules are seen within the visualized lungs, compatible with  metastatic disease. CXR 8/3/2022:  Rounded right basilar opacity concerning for a mass. Numerous bilateral  nodular densities noted bilaterally concerning for metastatic disease. CT of  the chest with contrast recommended for further evaluation. 8/3/22   Albumin: 2.3 (L)  Alk Phos: 3,403 (H)  ALT: 429 (H)  AST: 732 (H)  Bilirubin: 17.6 (H)  Bilirubin, Direct: 13.1 (H)  Total Protein: 7.3    I discussed with her about her poor prognosis. Septic w/u was ordered and she is on ABX. She remains confused. I discussed with son and brother today about her poor prognosis. Due to rapid progression of the cancer including liver failure from cancer, I suggested hospice to son and her brother. Patient wants to go home. Awaiting for hospice evaluation.  will talk to them also. PHYSICAL EXAM    Vitals: /69   Pulse 88   Temp 97.3 °F (36.3 °C) (Oral)   Resp 18   Ht 5' 6\" (1.676 m)   Wt 217 lb 9 oz (98.7 kg)   SpO2 95%   BMI 35.12 kg/m²   CONSTITUTIONAL: awake, confused, no apparent distress   EYES: pupils equal, round.  Icteric sclera  and conjunctiva normal  ENT: Normocephalic, without obvious abnormality, atraumatic  NECK: supple, symmetrical, no jugular venous distension and no carotid bruits   HEMATOLOGIC/LYMPHATIC: no cervical, supraclavicular or axillary lymphadenopathy   LUNGS: VBS, no wheezes, no crackles, no rhonchi, no increased work of breathing and clear to auscultation   CARDIOVASCULAR: regular rate and rhythm, normal S1 and S2, no murmur noted  ABDOMEN: normal bowel sound, soft, non-distended, non-tender, no masses palpated, no hepatosplenomegaly   MUSCULOSKELETAL: full range of motion noted, tone is normal  NEUROLOGIC: Confused. SKIN: + jaundice. Skin appears intact   EXTREMITIES: no LE edema, no cyanosis, no clubbing    LABORATORY RESULTS  CBC:   Recent Labs     08/04/22  1013 08/05/22  0017 08/06/22  1233   WBC 20.0* 19.6* 24.1*   HGB 15.9 15.4 16.9*   * 124* 126*     BMP:    Recent Labs     08/04/22  1013 08/05/22  0017 08/06/22  1233   * 131* 131*   K 4.8 4.5 4.8   CL 94* 97* 97*   CO2 22 20* 21   BUN 40* 32* 33*   CREATININE 0.3* 0.5* 0.3*   GLUCOSE 117* 105* 59*     Hepatic:   Recent Labs     08/04/22  1013 08/05/22  0017 08/06/22  1233   *  691* 636* 799*   *  378* 379* 455*   BILITOT 17.8*  17.8* 18.4* 22.2*   ALKPHOS 3,158*  3,158* 3,277* 3,799*     ASSESSMENT/RECOMMENDATION    1. She has recent diagnosis of metastatic breast cancer with visceral crisis. S/p gamma knife for brain mets on 8/1/2022. CARIS study, genetic counseling were ordered on last OV. She requested transfer to [facility]. No bed available. Due to liver failure, chemo therapy may accelerate her demise. Discussed with son and brother this morning. They are agreeable to have hospice evaluation.  will meet them also. 2. She has reactive leukocytosis. Elevated Hg is due to dehydration. To continue IVF hydration and supportive care. Discussed with hospitalist.    Overall prognosis is poor. We will follow the patient.  Thank you for allowing me to participate in the care of this very pleasant patient.

## 2022-08-07 NOTE — PROGRESS NOTES
Occupational [address] [place] [address] CARE OCCUPATIONAL THERAPY EVALUATION  Burton Wilkinson, 1950, 4120/4120-A, 8/7/2022    History  [place]:  The primary encounter diagnosis was Septic shock ([address] Diagnoses of Metastatic breast cancer ([address] and Hyperbilirubinemia were also pertinent to this visit. Patient  has a past medical history of Breast cancer ([address] Cancer ([address] Chemotherapy-induced neuropathy ([address] Ductal carcinoma in situ (DCIS) of right breast, History of external beam radiation therapy, History of external beam radiation therapy, Lymphedema of right upper extremity, and Pedal edema. Patient  has a past surgical history that includes Tunneled venous port placement; Breast surgery (Left, 2011); Breast biopsy (Left, 2002); Breast lumpectomy (Left, 2002); Breast biopsy (Right, 2014); Breast lumpectomy (Right, 2014); and IR BIOPSY LIVER PERCUTANEOUS (7/18/2022). Subjective:  Patient states:  \"Where am I?\". Pain:  Denies. Communication with other providers:  Handoff to RN  Restrictions: Neutropenic Precautions, General Precautions, Fall Risk    Home Setup/Prior level of function   Pt is greatly confused and is a poor historian. Per  Kaylan's note, pt was living alone and was fully Independent. Examination of body systems (includes body structures/functions, activity/participation limitations):  Observation:  Supine in bed upon arrival, agreeable to therapy   Vision:  Glasses  Hearing:  [facility]  Cardiopulmonary:  No 02 needs      Body Systems and functions:  ROM R/L:  WFL.     Strength R/L:  4-/5,   Sensation: Denies numbness  Tone: Normal  Coordination: Hand over hand to coordinate  Perception: Difficulty sitting/laying midline, mod Vcs and tactile cues to initiate/sequence    Activities of Daily Living (ADLs):  Feeding: Setup/SBA  Grooming: CGA (washing hands/face w/ warm washcloth)  UB bathing: Barbie  LB bathing: maxA  UB dressing: modA  LB dressing: maxA (donning Moderate  Prognosis: Good, no significant barriers to participation at this time.    Plan  Times per Week: 3x+  Times per Day: Daily  Current Treatment Recommendations: Strengthening, ROM, Balance training, Functional mobility training, Endurance training, Patient/Caregiver education & training, Home management training, Self-Care / ADL, Safety education & training, Cognitive/Perceptual training, Pain management, Cognitive reorientation, Equipment evaluation, education, & procurement, Positioning     Equipment: defer    Goals:  Pt goal: go home  Time Frame for STGs: discharge  Goal 1: Pt will perform UE ADLs Independent  Goal 2: Pt will perform LE ADLs Independent  Goal 3: Pt will perform toileting Independent  Goal 4: Pt will perform functional transfer w/ AD Alina  Goal 5: Pt will perform functional mobility w/ AD Alina  Goal 6: Pt will perform therex/theract in order to increase functional activity tolerance and dynamic standing balance    Treatment plan:  Pt will perform functional task in sit reaching in all 3 planes in order to increase dynamic sitting balance and functional activity tolerance    Recommendations for NURSING activity: Xuan EOB    Time:   Time in: 0815  Time out: 0831  Timed treatment minutes: 8 minutes  Total time: 16 minutes    Electronically signed by:    Denae LIMA/L [id number]  9:58 AM,8/7/2022

## 2022-08-07 NOTE — PLAN OF CARE
Problem: Safety - Adult  Goal: Free from fall injury  Outcome: Progressing     Problem: Skin/Tissue Integrity  Goal: Absence of new skin breakdown  Description: 1. Monitor for areas of redness and/or skin breakdown  2. Assess vascular access sites hourly  3. Every 4-6 hours minimum:  Change oxygen saturation probe site  4. Every 4-6 hours:  If on nasal continuous positive airway pressure, respiratory therapy assess nares and determine need for appliance change or resting period.   Outcome: Progressing     Problem: Nutrition Deficit:  Goal: Optimize nutritional status  Outcome: Not Progressing

## 2022-08-07 NOTE — CARE COORDINATION
CM reviewed chart and saw pt at bedside. Pt brother and son, Kristin Vieira at bedside. Pt gave permission to discuss healthcare with family. Pt's stated goal is to be at home. CM had the opportunity to discuss d/c planning with family. Goal is home w/ hospice and will require 24/7 care. Pt's support includes her son (lives in [place]), brother and [employer] member (hours limited). CM provided list for private non skilled care. Kristin Vieira would like [facility] (is father received hospice care w/ them) nurse was Angus. CM called referral.    Family is finding [employer] to assist and calling the non skilled home health agencies for near 24/7 care. CM will con't to follow. [address] at 1600 today.

## 2022-08-08 VITALS
RESPIRATION RATE: 18 BRPM | DIASTOLIC BLOOD PRESSURE: 85 MMHG | WEIGHT: 207 LBS | BODY MASS INDEX: 33.27 KG/M2 | HEART RATE: 95 BPM | HEIGHT: 66 IN | TEMPERATURE: 97.6 F | SYSTOLIC BLOOD PRESSURE: 122 MMHG | OXYGEN SATURATION: 95 %

## 2022-08-08 LAB
CULTURE: NORMAL
CULTURE: NORMAL
GLUCOSE BLD-MCNC: 73 MG/DL (ref 70–99)
GLUCOSE BLD-MCNC: 74 MG/DL (ref 70–99)
GLUCOSE BLD-MCNC: 75 MG/DL (ref 70–99)
Lab: NORMAL
Lab: NORMAL
SPECIMEN: NORMAL
SPECIMEN: NORMAL

## 2022-08-08 PROCEDURE — 82962 GLUCOSE BLOOD TEST: CPT

## 2022-08-08 PROCEDURE — 2580000003 HC RX 258: Performed by: NURSE PRACTITIONER

## 2022-08-08 PROCEDURE — 97535 SELF CARE MNGMENT TRAINING: CPT

## 2022-08-08 PROCEDURE — 97530 THERAPEUTIC ACTIVITIES: CPT

## 2022-08-08 PROCEDURE — 99232 SBSQ HOSP IP/OBS MODERATE 35: CPT | Performed by: INTERNAL MEDICINE

## 2022-08-08 PROCEDURE — 6370000000 HC RX 637 (ALT 250 FOR IP): Performed by: NURSE PRACTITIONER

## 2022-08-08 RX ORDER — LORAZEPAM 2 MG/ML
1 CONCENTRATE ORAL
Qty: 30 ML | Refills: 0 | Status: SHIPPED | OUTPATIENT
Start: 2022-08-08 | End: 2022-08-11

## 2022-08-08 RX ORDER — DEXAMETHASONE 2 MG/1
2 TABLET ORAL
Qty: 3 TABLET | Refills: 0 | Status: SHIPPED | OUTPATIENT
Start: 2022-08-08 | End: 2022-08-11

## 2022-08-08 RX ORDER — LORAZEPAM 2 MG/ML
1 CONCENTRATE ORAL
Status: DISCONTINUED | OUTPATIENT
Start: 2022-08-08 | End: 2022-08-08 | Stop reason: HOSPADM

## 2022-08-08 RX ORDER — LEVETIRACETAM 500 MG/1
500 TABLET ORAL 2 TIMES DAILY
Qty: 60 TABLET | Refills: 0 | Status: SHIPPED | OUTPATIENT
Start: 2022-08-08 | End: 2022-08-08 | Stop reason: SDUPTHER

## 2022-08-08 RX ORDER — MORPHINE SULFATE 20 MG/ML
5 SOLUTION ORAL
Qty: 10 ML | Refills: 0 | Status: SHIPPED | OUTPATIENT
Start: 2022-08-08 | End: 2022-08-11

## 2022-08-08 RX ORDER — LACTULOSE 10 G/15ML
20 SOLUTION ORAL 3 TIMES DAILY
Qty: 2700 ML | Refills: 0 | Status: SHIPPED | OUTPATIENT
Start: 2022-08-08 | End: 2022-09-07

## 2022-08-08 RX ORDER — MORPHINE SULFATE 20 MG/ML
5 SOLUTION ORAL
Status: DISCONTINUED | OUTPATIENT
Start: 2022-08-08 | End: 2022-08-08 | Stop reason: HOSPADM

## 2022-08-08 RX ORDER — DEXAMETHASONE 4 MG/1
2 TABLET ORAL
Qty: 2 TABLET | Refills: 0 | Status: SHIPPED | OUTPATIENT
Start: 2022-08-08 | End: 2022-08-08 | Stop reason: SDUPTHER

## 2022-08-08 RX ORDER — LEVETIRACETAM 500 MG/1
500 TABLET ORAL 2 TIMES DAILY
Qty: 60 TABLET | Refills: 0 | Status: SHIPPED | OUTPATIENT
Start: 2022-08-08 | End: 2022-09-07

## 2022-08-08 RX ADMIN — SODIUM CHLORIDE, PRESERVATIVE FREE 10 ML: 5 INJECTION INTRAVENOUS at 10:34

## 2022-08-08 RX ADMIN — LEVETIRACETAM 500 MG: 500 TABLET, FILM COATED ORAL at 10:33

## 2022-08-08 RX ADMIN — DEXAMETHASONE 2 MG: 2 TABLET ORAL at 10:33

## 2022-08-08 RX ADMIN — PANTOPRAZOLE SODIUM 40 MG: 40 TABLET, DELAYED RELEASE ORAL at 10:33

## 2022-08-08 NOTE — PLAN OF CARE
Problem: Discharge Planning  Goal: Discharge to home or other facility with appropriate resources  Outcome: Completed     Problem: Safety - Adult  Goal: Free from fall injury  Outcome: Completed     Problem: Skin/Tissue Integrity  Goal: Absence of new skin breakdown  Description: 1. Monitor for areas of redness and/or skin breakdown  2. Assess vascular access sites hourly  3. Every 4-6 hours minimum:  Change oxygen saturation probe site  4. Every 4-6 hours:  If on nasal continuous positive airway pressure, respiratory therapy assess nares and determine need for appliance change or resting period. Outcome: Completed     Problem: Nutrition Deficit:  Goal: Optimize nutritional status  Outcome: Completed     Care plans completed at this time, patient being discharged home with hospice care.      MEAGHAN CedilloN, RN

## 2022-08-08 NOTE — CONSULTS
Palliative Care consult for goals of care and code status discussion. Code status changed to Limited with no interventions 8/7 MEDICAL [facility])- confirmed code status [facility] with son Dayan Moralez and patient's 2 brothers. Joey Yuan signed paper [facility] form. Patient was resting on room air with respirations unlabored. Vital signs have been stable. Patient easily aroused to calling her name. Introduced my self and informed her of palliative Care. Patient responded \"here we go again\". Inquired if she had any needs or concerns. She deferred any further communication with me. Spoke with the bedside nurse, Hospitalist DR. Cardenas, and [employer]. Comfort care meds have been ordered for now and for discharge. Patient has not been requiring any meds for symptom management up to this point. She is to be discharged home today with [facility]. Consents were signed yesterday. Spoke with patient's Son Dayan Moralez, brother Hilaria Hanson, and brother Steven Bailey whom were present in the hallway. Family asked for a nurse to come out to the house after patient is settled. Mike RN is working on that. Family declines any further needs at this time. They are aware of scripts and have been instructed by Hospice on getting scripts filled and provided with there billing info. They verbalized that they understand patient's condition and they have no further questions. Dayan Moralez will be staying with patient and they are arranging for additional help in congruent with Hospice. Per family patient has been bed bound for almost a week. They declined PT/OT previously and again to me now. Transportation is set up for by Hospice for 1330 today.

## 2022-08-08 NOTE — PROGRESS NOTES
ONCOLOGY HEMATOLOGY CARE ([facility])  PROGRESS NOTE      HISTORY OF PRESENT ILLNESS   Burton Wilkinson is a 70 y.o. female who presents generalized weakness and jaundice. She was recently diagnosed with triple negative metastatic breast cancer with visceral involvement and brain mets. S/p gamma knife on 8/1/2022 to the  brain at [facility]  She was seen by Dr Shirley Sheikh for [address] At [facility] placement which was canceled due to generalized weakness and jaundice. She was instructed to go to hospital.    CT abdomen and pelvis 8/3/2022  Extensive metastatic disease throughout the liver, occupying the majority of  the hepatic parenchyma. Mass within the right middle lobe, incompletely evaluated, possibly the  primary neoplasm. Numerous nodules are seen within the visualized lungs, compatible with  metastatic disease. CXR 8/3/2022:  Rounded right basilar opacity concerning for a mass. Numerous bilateral  nodular densities noted bilaterally concerning for metastatic disease. CT of  the chest with contrast recommended for further evaluation. 8/3/22   Albumin: 2.3 (L)  Alk Phos: 3,403 (H)  ALT: 429 (H)  AST: 732 (H)  Bilirubin: 17.6 (H)  Bilirubin, Direct: 13.1 (H)  Total Protein: 7.3    I discussed with her about her poor prognosis. I discussed with son and brother yesterday about her poor prognosis. Due to rapid progression of the cancer including liver failure from cancer, I suggested hospice to son and her brother. Patient wants to go home. No acute distress. Confused. Wants to go home. Awaiting for hospice evaluation.  will talk to them also. PHYSICAL EXAM    Vitals: /85   Pulse 95   Temp 97.6 °F (36.4 °C) (Oral)   Resp 18   Ht 5' 6\" (1.676 m)   Wt 207 lb (93.9 kg)   SpO2 95%   BMI 33.41 kg/m²   CONSTITUTIONAL: confused, no apparent distress   EYES: pupils equal, round.  Icteric sclera  and conjunctiva normal  ENT: Normocephalic, without obvious abnormality, atraumatic  NECK: supple, symmetrical, no jugular venous distension and no carotid bruits   HEMATOLOGIC/LYMPHATIC: no cervical, supraclavicular or axillary lymphadenopathy   LUNGS: VBS, no wheezes, no crackles, no rhonchi, no increased work of breathing and clear to auscultation   CARDIOVASCULAR: regular rate and rhythm, normal S1 and S2, no murmur noted  ABDOMEN: normal bowel sound, soft, non-distended, non-tender, no masses palpated, no hepatosplenomegaly   MUSCULOSKELETAL: full range of motion noted  NEUROLOGIC: Confused. SKIN: + jaundice. Skin appears intact   EXTREMITIES: no LE edema, no cyanosis, no clubbing    LABORATORY RESULTS  CBC:   Recent Labs     08/06/22  1233 08/07/22  1217   WBC 24.1* 21.1*   HGB 16.9* 15.6   * 80*     BMP:    Recent Labs     08/06/22  1233 08/07/22  1217   * 130*   K 4.8 5.1   CL 97* 98*   CO2 21 22   BUN 33* 36*   CREATININE 0.3* 0.3*   GLUCOSE 59* 75     Hepatic:   Recent Labs     08/06/22  1233 08/07/22  1217   * 834*   * 439*   BILITOT 22.2* 20.6*   ALKPHOS 3,799* 3,498*     ASSESSMENT/RECOMMENDATION    1. She has recent diagnosis of metastatic breast cancer with visceral crisis. S/p gamma knife for brain mets on 8/1/2022. CARIS study, genetic counseling were ordered on last OV. She requested transfer to [facility]. No bed available. Due to liver failure, chemo therapy may accelerate her demise. Discussed with son and brother this morning. They are agreeable to have hospice evaluation. Awaiting for hospice evaluation. 2. She has reactive leukocytosis. Elevated Hg is due to dehydration. To continue IVF hydration and supportive care. Discussed with hospitalist.    Overall prognosis is poor. We will follow the patient. Thank you for allowing me to participate in the care of this very pleasant patient.

## 2022-08-08 NOTE — PROGRESS NOTES
Outpatient Pharmacy Progress Note for Meds-to-Beds    Total number of Prescriptions Filled: 5  The following medications were dispensed to the patient during the discharge process:  Lactulose  Morphine solution  Lorazepam solution  Dexamethasone  Keppra    Additional Documentation:  Patient's family member picked-up the medication(s) in the OP Pharmacy      Thank you for letting us serve your patients.   [facility]    [address], [address] [facility]    Phone: [phone]    Fax: [phone]

## 2022-08-08 NOTE — PROGRESS NOTES
Occupational Therapy  . Occupational Therapy Treatment Note    Name: Lottie Burkett MRN: [id number] :   1950   Date:  2022   Admission Date: 8/3/2022 Room:  [facility]     Primary Problem:  The primary encounter diagnosis was Septic shock ([address] Diagnoses of Metastatic breast cancer ([facility] [address] and Hyperbilirubinemia were also pertinent to this visit. Restrictions/Precautions:          Neutropenic Precautions, General Precautions, Fall Risk    Communication with other providers: Per chart review and Nurse Tana Yost, patient is appropriate for therapeutic intervention if agreeable, will be discharging home c initiation of Hospice Care. Nurse Tech assisting c pt care. Subjective:  Patient states:  \"I don't want to wash up everything. \" Pt agreeable to toileting hygiene and bed mobility. Pain:   Location, Type, Intensity (0/10 to 10/10):  0/10, denies pain, reports weakness and fatigue    Objective:    Observation: Pt received R side-lying in bed, jaundice appearance. Pt A&O to self / situation. Pt became more engaged as Tx session progressed. Objective Measures:  N/A    Treatment, including education:  Therapeutic Activity Training:   Therapeutic activity training was instructed today. Cues were given for safety, sequence, UE/LE placement, awareness, and balance. Activities performed today included bed mobility training. Rolling: Varied Min A to Mod A x2 + cues for sequencing to roll R<>L x3 times in bed during hygiene and linen change. Care Training:   Cues were given for safety, sequence, UE/LE placement, visual cues, and balance. Activities performed today included toileting hygiene. Pt followed cues for assisting c moving BLE for Dep hygiene s/p incontinence of stool through brief and onto brief, unable to perform w/o significant assistance to move BLE.      All therapeutic intervention performed c emphasis on bed mobility and repositioning to inc strength, endurance and act tolerance for inc Indep c ADL tasks, func transfers / mobility. Safety  Patient safely in beds + alarm at end of session, with call light/phone in reach, and nursing aware. Brother arriving to sit c patient. Assessment / Impression:    Patient's tolerance of treatment: Well  Adverse Reaction: None  Significant change in status and impact:  None  Barriers to improvement: Metastatic cancer, decreased strength / endurance      Plan for Next Session:    Continue per OT POC until discharge. Pt is discharging home for Hospice Care.     Time in:  1030  Time out:  1054  Timed treatment minutes:  24  Total treatment time:  24      Electronically signed by:    GERRI Maldonado  8/8/2022, 12:12 PM    Goals:  Pt goal: go home  Time Frame for STGs: discharge  Goal 1: Pt will perform UE ADLs Independent  Goal 2: Pt will perform LE ADLs Independent  Goal 3: Pt will perform toileting Independent  Goal 4: Pt will perform functional transfer w/ AD Alina  Goal 5: Pt will perform functional mobility w/ AD Alina  Goal 6: Pt will perform therex/theract in order to increase functional activity tolerance and dynamic standing balance

## 2022-08-08 NOTE — CARE COORDINATION
Confirmed with hospice, plan for discharge home today, they have equipment being delivered and requested comfort meds/ signed DNR form.

## 2022-08-08 NOTE — DISCHARGE SUMMARY
Discharge Summary    Name:  Nancy Brown /Age/Sex: 1950  (70 y.o. female)   MRN & CSN:  [id number] & [id number] Admission Date/Time: 8/3/2022 11:15 AM   Attending:  Rosette Lozano MD Discharging Physician: Rosette Lozano MD     Hospital Course:   Nancy Brown is a 70 y.o.  female  who presents with generalized weakness. Pt has been diagnosed recently with metastatic disease to the liver and brain. She was hospitalized for severe sepsis to unknown etiology. She received IVF resuscitation and broad abx. No source of infection identified. Hem/Onc was consulted. Pt has very poor prognosis. Her labs revealed liver failure. Palliative and hospice care were consulted. Pt and her family decided for hospice. Pt's code status was changed to [facility]. Pt was discharged to hospice. The following problems have been addressed during this hospitalization. #suspected severe sepsis in immunocompromised host.  No source of infection identified. Afebrile and hemodynamically stable. 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 S/p chemo/rad, mastectomy. Now has mets to liver with biopsy suggestive of breast primary and mets to brain s/p gamma knife therapy  on steroids taper.  - hem/onc consulted;  - continue home steroids taper  - continue Keppra  - plan as above        #Liver failure 2/2 metastatic liver disease  AST, ALT in the hundreds, bilirubin ~17, alk 3000's, ammonia 75. INR trending upward last one 1.6 . Now having hypoglycemia episodes              - continue supportive therapy              - continue lactulose for hepatic encephalopathy              - had U/S 2022 with evidence of liver mets; would hold off repeat U/S    #acute metabolic encephalopathy likely 2/2 hepatic from liver mets. Elevated ammonia. - lactulose as above                #Dispo.  Pt and family have decided to go for hospice. Await hospice evaluation and acceptance. Plan to discharge home with hospice. Code status changed to Limitedx4 per pt and son. Patient was seen and examined at bedside on day of discharge. This note can be used as the progress note for today's visit. Pt has new complaints or concerns. She wanted to be left alone. Answered all family questions. Physical Exam  General: NAD, lethargic, awakens to questions  Eyes: EOMI  ENT: neck supple  Cardiovascular: Regular rate. Respiratory: Clear to auscultation  Gastrointestinal: Soft, non tender  Genitourinary: no suprapubic tenderness  Musculoskeletal: No edema  Skin: warm, dry  Psych: Mood appropriate. The patient expressed appropriate understanding of and agreement with the discharge recommendations, medications, and plan. Consults this admission:  IP CONSULT TO ONCOLOGY  IP CONSULT TO HOSPITALIST  PHARMACY TO DOSE VANCOMYCIN  IP CONSULT TO PALLIATIVE CARE  IP CONSULT TO IV TEAM  IP CONSULT TO [address] [place] [address]      Discharge Instruction:     Diet:  regular diet   Activity: activity as tolerated  Disposition: Discharged to:   []Home, [][facility], []SNF, []Acute Rehab, [x]Hospice   Condition on discharge: Stable    Discharge Medications:        Medication List        START taking these medications      lactulose 10 GM/15ML solution  Commonly known as: CHRONULAC  Take 30 mLs by mouth in the morning and 30 mLs at noon and 30 mLs before bedtime. LORazepam 2 MG/ML concentrated solution  Commonly known as: ATIVAN  Take 0.5 mLs by mouth every 2 hours as needed (anxiety) for up to 3 days. morphine 20MG/ML Soln concentrated solution  Take 0.25 mLs by mouth every 2 hours as needed (For patient comfort) for up to 3 days.             CHANGE how you take these medications      dexamethasone 2 MG tablet  Commonly known as: DECADRON  Take 1 tablet by mouth daily (with breakfast) for 3 days  What changed:   medication strength  how much to take  when to take this     levETIRAcetam 500 MG tablet  Commonly known as: Keppra  Take 1 tablet by mouth in the morning and 1 tablet before bedtime. What changed:   additional instructions  Another medication with the same name was removed. Continue taking this medication, and follow the directions you see here. CONTINUE taking these medications      pantoprazole 40 MG tablet  Commonly known as: PROTONIX  Take 1 tablet by mouth in the morning. polyethylene glycol 17 GM/SCOOP powder  Commonly known as: GLYCOLAX  Take 17 g by mouth daily as needed (constipation)     sennosides-docusate sodium 8.6-50 MG tablet  Commonly known as: SENOKOT-S  Take 2 tablets by mouth in the morning. STOP taking these medications      Aldactazide 50-50 MG per tablet  Generic drug: spironolactone-hydroCHLOROthiazide     MULTIPLE VITAMIN PO     vitamin B-1 100 MG tablet  Commonly known as: THIAMINE     vitamin B-12 1000 MCG tablet  Commonly known as: CYANOCOBALAMIN     vitamin B-6 100 MG tablet  Commonly known as: PYRIDOXINE               Where to Get Your Medications        You can get these medications from any pharmacy    Bring a paper prescription for each of these medications  dexamethasone 2 MG tablet  lactulose 10 GM/15ML solution  levETIRAcetam 500 MG tablet  LORazepam 2 MG/ML concentrated solution  morphine 20MG/ML Soln concentrated solution         Objective Findings at Discharge:       BMP/CBC  Recent Labs     08/06/22  1233 08/07/22  1217   * 130*   K 4.8 5.1   CL 97* 98*   CO2 21 22   BUN 33* 36*   CREATININE 0.3* 0.3*   WBC 24.1* 21.1*   HCT 47.4* 43.9   * 80*           Additional Information: Patient seen and examined day of discharge.  For more information regarding patient's care please contact [facility] [phone]    Discharge Time of 35 minutes    Electronically signed by Juanis Gates MD on 8/8/2022 at 12:40 PM

## 2022-08-09 NOTE — CARE COORDINATION
Confirmed with Robe @ [facility] that Jcarlos Chan was enrolled last night. No need for CTN calls.
